# Patient Record
Sex: FEMALE | Race: WHITE | Employment: OTHER | URBAN - METROPOLITAN AREA
[De-identification: names, ages, dates, MRNs, and addresses within clinical notes are randomized per-mention and may not be internally consistent; named-entity substitution may affect disease eponyms.]

---

## 2017-01-24 ENCOUNTER — OFFICE VISIT (OUTPATIENT)
Dept: INTERNAL MEDICINE CLINIC | Facility: CLINIC | Age: 72
End: 2017-01-24

## 2017-01-24 VITALS
WEIGHT: 170 LBS | DIASTOLIC BLOOD PRESSURE: 64 MMHG | SYSTOLIC BLOOD PRESSURE: 124 MMHG | HEIGHT: 67 IN | BODY MASS INDEX: 26.68 KG/M2 | RESPIRATION RATE: 16 BRPM | TEMPERATURE: 98 F | HEART RATE: 68 BPM

## 2017-01-24 DIAGNOSIS — H66.92 LEFT ACUTE OTITIS MEDIA: Primary | ICD-10-CM

## 2017-01-24 DIAGNOSIS — M54.2 NECK PAIN, MUSCULOSKELETAL: ICD-10-CM

## 2017-01-24 PROCEDURE — 99213 OFFICE O/P EST LOW 20 MIN: CPT | Performed by: INTERNAL MEDICINE

## 2017-01-24 RX ORDER — AZITHROMYCIN 250 MG/1
TABLET, FILM COATED ORAL
Qty: 6 TABLET | Refills: 0 | Status: SHIPPED | OUTPATIENT
Start: 2017-01-24 | End: 2017-03-20 | Stop reason: ALTCHOICE

## 2017-01-24 NOTE — PROGRESS NOTES
Carol Lewis is a 80year old female.   Patient presents with:  Ear Pain: bilateral since the weekend  Neck Pain: started today ; no headaches   Nasal Congestion: drippy  throat per patient       HPI:     Patient c/o b/l ear pain, nasal congestion, fatigu Comment: 3 x year    Family History   Problem Relation Age of Onset   • Cancer Father 80     prostate    • Heart Disorder Father    • High Blood Pressure Father    • Other[other] [OTHER] Father    • Cancer Mother      lung, was a smoker   • Heart Disord finished           Imaging & Consults:  None    Return if symptoms worsen or fail to improve. There are no Patient Instructions on file for this visit. The patient indicates understanding of these issues and agrees to the plan.

## 2017-03-20 ENCOUNTER — OFFICE VISIT (OUTPATIENT)
Dept: HEMATOLOGY/ONCOLOGY | Facility: HOSPITAL | Age: 72
End: 2017-03-20
Attending: INTERNAL MEDICINE
Payer: MEDICARE

## 2017-03-20 VITALS
SYSTOLIC BLOOD PRESSURE: 115 MMHG | TEMPERATURE: 98 F | HEART RATE: 72 BPM | RESPIRATION RATE: 18 BRPM | WEIGHT: 170.38 LBS | DIASTOLIC BLOOD PRESSURE: 64 MMHG | OXYGEN SATURATION: 100 % | HEIGHT: 67.01 IN | BODY MASS INDEX: 26.74 KG/M2

## 2017-03-20 DIAGNOSIS — Z79.01 ADEQUATE ANTICOAGULATION ON ANTICOAGULANT THERAPY: ICD-10-CM

## 2017-03-20 DIAGNOSIS — I82.4Z3 ACUTE DEEP VEIN THROMBOSIS (DVT) OF DISTAL VEIN OF BOTH LOWER EXTREMITIES (HCC): Primary | ICD-10-CM

## 2017-03-20 DIAGNOSIS — I26.09 OTHER PULMONARY EMBOLISM WITH ACUTE COR PULMONALE, UNSPECIFIED CHRONICITY (HCC): ICD-10-CM

## 2017-03-20 LAB
ALBUMIN SERPL-MCNC: 3.6 G/DL (ref 3.5–4.8)
ALP LIVER SERPL-CCNC: 58 U/L (ref 55–142)
ALT SERPL-CCNC: 19 U/L (ref 14–54)
AST SERPL-CCNC: 15 U/L (ref 15–41)
BASOPHILS # BLD AUTO: 0.07 X10(3) UL (ref 0–0.1)
BASOPHILS NFR BLD AUTO: 1.4 %
BILIRUB SERPL-MCNC: 0.5 MG/DL (ref 0.1–2)
BUN BLD-MCNC: 14 MG/DL (ref 8–20)
CALCIUM BLD-MCNC: 9.1 MG/DL (ref 8.3–10.3)
CHLORIDE: 105 MMOL/L (ref 101–111)
CO2: 25 MMOL/L (ref 22–32)
CREAT BLD-MCNC: 0.83 MG/DL (ref 0.55–1.02)
EOSINOPHIL # BLD AUTO: 0.08 X10(3) UL (ref 0–0.3)
EOSINOPHIL NFR BLD AUTO: 1.6 %
ERYTHROCYTE [DISTWIDTH] IN BLOOD BY AUTOMATED COUNT: 12.7 % (ref 11.5–16)
GLUCOSE BLD-MCNC: 110 MG/DL (ref 70–99)
HCT VFR BLD AUTO: 37.5 % (ref 34–50)
HGB BLD-MCNC: 12.9 G/DL (ref 12–16)
IMMATURE GRANULOCYTE COUNT: 0.02 X10(3) UL (ref 0–1)
IMMATURE GRANULOCYTE RATIO %: 0.4 %
LYMPHOCYTES # BLD AUTO: 1.65 X10(3) UL (ref 0.9–4)
LYMPHOCYTES NFR BLD AUTO: 32.8 %
M PROTEIN MFR SERPL ELPH: 7.3 G/DL (ref 6.1–8.3)
MCH RBC QN AUTO: 31.8 PG (ref 27–33.2)
MCHC RBC AUTO-ENTMCNC: 34.4 G/DL (ref 31–37)
MCV RBC AUTO: 92.4 FL (ref 81–100)
MONOCYTES # BLD AUTO: 0.51 X10(3) UL (ref 0.1–0.6)
MONOCYTES NFR BLD AUTO: 10.1 %
NEUTROPHIL ABS PRELIM: 2.7 X10 (3) UL (ref 1.3–6.7)
NEUTROPHILS # BLD AUTO: 2.7 X10(3) UL (ref 1.3–6.7)
NEUTROPHILS NFR BLD AUTO: 53.7 %
PLATELET # BLD AUTO: 235 10(3)UL (ref 150–450)
POTASSIUM SERPL-SCNC: 4.1 MMOL/L (ref 3.6–5.1)
RBC # BLD AUTO: 4.06 X10(6)UL (ref 3.8–5.1)
RED CELL DISTRIBUTION WIDTH-SD: 42.8 FL (ref 35.1–46.3)
SODIUM SERPL-SCNC: 139 MMOL/L (ref 136–144)
WBC # BLD AUTO: 5 X10(3) UL (ref 4–13)

## 2017-03-20 PROCEDURE — 99214 OFFICE O/P EST MOD 30 MIN: CPT | Performed by: INTERNAL MEDICINE

## 2017-03-20 NOTE — PROGRESS NOTES
Hematology Clinic Follow Up Visit    Patient Name: Verona Triana  Medical Record Number: [id number]    YOB: 1945    PCP: Dr. Temitope Montero  Other providers:  Dr. Ilda Sorto (cardiology/vascular)    Reason for Consultation:  Verona Triana wa mouth. Disp:  Rfl:    Digestive Enzymes (DIGESTIVE SUPPORT OR) Take by mouth. Disp:  Rfl:    OCUVITE Oral Tab Take 1 tablet by mouth daily with breakfast. Disp:  Rfl:    AmLODIPine Besylate (NORVASC) 2.5 MG Oral Tab Take 2.5 mg by mouth daily.  Disp:  Rfl: 77.293 kg (170 lb 6.4 oz)  01/24/17 : 77.111 kg (170 lb)  11/28/16 : 75.569 kg (166 lb 9.6 oz)  11/17/16 : 73.936 kg (163 lb)  09/19/16 : 74.163 kg (163 lb 8 oz)  09/12/16 : 74.753 kg (164 lb 12.8 oz)    Physical Examination:  General: Patient is alert and (H)   PTT (Hepzyme)      25-34 seconds 49.0 (H)   STACLOT LA DELTA      <=8.00 seconds 15.30 (H)   DRVVT RATIO      0.00-1.29 1.05   BETA 2 GLYCOPROTEIN 1 AB, IgM      <=15.0 U/mL <3.7   BETA 2 GLYCOPROTEIN 1 AB, IgG      <=15.0 U/mL <3.7   PHOSPHOLIPID AB

## 2017-03-20 NOTE — PATIENT INSTRUCTIONS
For Dr. Linda Chew nurse line, call [phone] with any questions or concerns Monday through Friday 8:00 to 4:30. After hours or weekends for emergent needs [phone].

## 2017-03-20 NOTE — PROGRESS NOTES
Patient here for MD f/u. States feeling well, no complaints. Taking eliquis 2.5mg BID. Denies abnormal bleeding/bruising, denies SOB.

## 2017-05-03 RX ORDER — AMLODIPINE BESYLATE 2.5 MG/1
2.5 TABLET ORAL DAILY
Qty: 90 TABLET | Refills: 1 | Status: SHIPPED | OUTPATIENT
Start: 2017-05-03 | End: 2017-07-06

## 2017-06-05 ENCOUNTER — TELEPHONE (OUTPATIENT)
Dept: INTERNAL MEDICINE CLINIC | Facility: CLINIC | Age: 72
End: 2017-06-05

## 2017-06-05 DIAGNOSIS — Z12.31 ENCOUNTER FOR SCREENING MAMMOGRAM FOR MALIGNANT NEOPLASM OF BREAST: Primary | ICD-10-CM

## 2017-06-20 RX ORDER — NEOMYCIN SULFATE, POLYMYXIN B SULFATE, HYDROCORTISONE 3.5; 10000; 1 MG/ML; [USP'U]/ML; MG/ML
SOLUTION/ DROPS AURICULAR (OTIC)
Qty: 10 ML | Refills: 1 | Status: SHIPPED | OUTPATIENT
Start: 2017-06-20 | End: 2017-10-12

## 2017-06-20 NOTE — TELEPHONE ENCOUNTER
LOV: 1/24/17  Future office visit: No upcoming visit   Last labs: 3/20/17 Cmp Cbc   Last RX: Neomy/Polymyx-b/hc Otic Sol 11/29/16 #10 mL #1 Refill  Per protocol: Route to provider

## 2017-06-27 RX ORDER — MONTELUKAST SODIUM 10 MG/1
10 TABLET ORAL DAILY
Qty: 90 TABLET | Refills: 0 | Status: SHIPPED | OUTPATIENT
Start: 2017-06-27 | End: 2017-10-10

## 2017-06-30 ENCOUNTER — HOSPITAL ENCOUNTER (OUTPATIENT)
Dept: MAMMOGRAPHY | Age: 72
Discharge: HOME | End: 2017-06-30
Attending: INTERNAL MEDICINE
Payer: MEDICARE

## 2017-06-30 DIAGNOSIS — Z12.31 ENCOUNTER FOR SCREENING MAMMOGRAM FOR MALIGNANT NEOPLASM OF BREAST: ICD-10-CM

## 2017-06-30 PROCEDURE — 77067 SCR MAMMO BI INCL CAD: CPT | Performed by: INTERNAL MEDICINE

## 2017-07-05 ENCOUNTER — PATIENT MESSAGE (OUTPATIENT)
Dept: INTERNAL MEDICINE CLINIC | Facility: CLINIC | Age: 72
End: 2017-07-05

## 2017-07-05 NOTE — TELEPHONE ENCOUNTER
From: Jp Acosta  To: Oseas Beaulieu MD  Sent: 7/5/2017 6:46 AM CDT  Subject: Non-Urgent Medical Question    Dr. Carmen Razo:    Never have I had a reaction to a shot than I've been having with this 2nd pneumonia vaccine!  My arm is pretty sore, es

## 2017-07-07 RX ORDER — AMLODIPINE BESYLATE 2.5 MG/1
TABLET ORAL
Qty: 90 TABLET | Refills: 0 | Status: SHIPPED | OUTPATIENT
Start: 2017-07-07 | End: 2017-10-26

## 2017-07-12 NOTE — TELEPHONE ENCOUNTER
Pt stating shot injection site pain has completley subsided after 10days. No swelling or redness occurred. Just muscle soreness. ROM back to normal. No further questions or concerns. Pt verbalized understanding.

## 2017-09-01 ENCOUNTER — OFFICE VISIT (OUTPATIENT)
Dept: INTERNAL MEDICINE CLINIC | Facility: CLINIC | Age: 72
End: 2017-09-01

## 2017-09-01 VITALS
DIASTOLIC BLOOD PRESSURE: 60 MMHG | WEIGHT: 174 LBS | SYSTOLIC BLOOD PRESSURE: 124 MMHG | HEIGHT: 66 IN | BODY MASS INDEX: 27.97 KG/M2 | RESPIRATION RATE: 16 BRPM | HEART RATE: 56 BPM | TEMPERATURE: 98 F

## 2017-09-01 DIAGNOSIS — Z86.711 HISTORY OF PULMONARY EMBOLUS (PE): ICD-10-CM

## 2017-09-01 DIAGNOSIS — E78.5 MILD HYPERLIPIDEMIA: ICD-10-CM

## 2017-09-01 DIAGNOSIS — D22.9 MULTIPLE NEVI: ICD-10-CM

## 2017-09-01 DIAGNOSIS — I10 BENIGN ESSENTIAL HTN: ICD-10-CM

## 2017-09-01 DIAGNOSIS — Z00.00 WELLNESS EXAMINATION: Primary | ICD-10-CM

## 2017-09-01 PROCEDURE — G0439 PPPS, SUBSEQ VISIT: HCPCS | Performed by: INTERNAL MEDICINE

## 2017-09-01 NOTE — PROGRESS NOTES
HPI:   Ivette Macias is a 71year old female who presents for a Medicare Subsequent Annual Wellness visit (Pt already had Initial Annual Wellness).     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 Metoprolol Tartrate 50 MG Oral Tab Take 0.5 tablets (25 mg total) by mouth 2 (two) times daily. Take 25 mg by mouth 2 (two) times daily.    Fexofenadine HCl (WA +hx of allergies    EXAM:   /60 (BP Location: Right arm, Patient Position: Sitting, Cuff Size: adult)   Pulse 56   Temp 98.2 °F (36.8 °C) (Oral)   Resp 16   Ht 66\"   Wt 174 lb   BMI 28.08 kg/m²  Estimated body mass index is 28.08 kg/m² as calculated 06/30/2017   • TDAP 09/12/2016       ASSESSMENT AND OTHER RELEVANT CHRONIC CONDITIONS:   Castro Acosta is a 71year old female who presents for a Medicare Assessment.      PLAN SUMMARY:   Diagnoses and all orders for this visit:    Wellness examination - u immunizations at another office such as Influenza, Hepatitis B, Tetanus, or Pneumococcal?: Yes     Functional Ability     Bathing or Showering: Able without help    Toileting: Able without help    Dressing: Able without help    Eating: Able without help This section provided for quick review of chart, separate sheet to patient  [address] Internal Lab or Procedure External Lab or Procedure   Diabetes Screening      HbgA1C   Annually No results found for: A1C No fl (Pneumovax)  Covered Once after 65 06/30/2017 Please get once after your 65th birthday    Hepatitis B for Moderate/High Risk No vaccine history found Medium/high risk factors:   End-stage renal disease   Hemophiliacs who received Factor VIII or IX concentr

## 2017-09-18 ENCOUNTER — APPOINTMENT (OUTPATIENT)
Dept: HEMATOLOGY/ONCOLOGY | Facility: HOSPITAL | Age: 72
End: 2017-09-18
Attending: INTERNAL MEDICINE
Payer: MEDICARE

## 2017-09-21 ENCOUNTER — OFFICE VISIT (OUTPATIENT)
Dept: HEMATOLOGY/ONCOLOGY | Facility: HOSPITAL | Age: 72
End: 2017-09-21
Attending: INTERNAL MEDICINE
Payer: MEDICARE

## 2017-09-21 VITALS
SYSTOLIC BLOOD PRESSURE: 145 MMHG | RESPIRATION RATE: 16 BRPM | HEIGHT: 67.01 IN | WEIGHT: 176.19 LBS | BODY MASS INDEX: 27.65 KG/M2 | HEART RATE: 60 BPM | DIASTOLIC BLOOD PRESSURE: 80 MMHG | OXYGEN SATURATION: 98 % | TEMPERATURE: 98 F

## 2017-09-21 DIAGNOSIS — I26.09 OTHER PULMONARY EMBOLISM WITH ACUTE COR PULMONALE, UNSPECIFIED CHRONICITY (HCC): ICD-10-CM

## 2017-09-21 DIAGNOSIS — I82.4Z3 ACUTE DEEP VEIN THROMBOSIS (DVT) OF DISTAL VEIN OF BOTH LOWER EXTREMITIES (HCC): ICD-10-CM

## 2017-09-21 DIAGNOSIS — Z79.01 ADEQUATE ANTICOAGULATION ON ANTICOAGULANT THERAPY: ICD-10-CM

## 2017-09-21 LAB
ALBUMIN SERPL-MCNC: 3.6 G/DL (ref 3.5–4.8)
ALP LIVER SERPL-CCNC: 48 U/L (ref 55–142)
ALT SERPL-CCNC: 24 U/L (ref 14–54)
AST SERPL-CCNC: 14 U/L (ref 15–41)
BASOPHILS # BLD AUTO: 0.04 X10(3) UL (ref 0–0.1)
BASOPHILS NFR BLD AUTO: 0.7 %
BILIRUB SERPL-MCNC: 0.4 MG/DL (ref 0.1–2)
BUN BLD-MCNC: 11 MG/DL (ref 8–20)
CALCIUM BLD-MCNC: 8.6 MG/DL (ref 8.3–10.3)
CHLORIDE: 103 MMOL/L (ref 101–111)
CO2: 26 MMOL/L (ref 22–32)
CREAT BLD-MCNC: 0.76 MG/DL (ref 0.55–1.02)
EOSINOPHIL # BLD AUTO: 0.11 X10(3) UL (ref 0–0.3)
EOSINOPHIL NFR BLD AUTO: 1.9 %
ERYTHROCYTE [DISTWIDTH] IN BLOOD BY AUTOMATED COUNT: 12.7 % (ref 11.5–16)
GLUCOSE BLD-MCNC: 106 MG/DL (ref 70–99)
HCT VFR BLD AUTO: 35.8 % (ref 34–50)
HGB BLD-MCNC: 12.4 G/DL (ref 12–16)
IMMATURE GRANULOCYTE COUNT: 0.02 X10(3) UL (ref 0–1)
IMMATURE GRANULOCYTE RATIO %: 0.3 %
LYMPHOCYTES # BLD AUTO: 2.07 X10(3) UL (ref 0.9–4)
LYMPHOCYTES NFR BLD AUTO: 34.8 %
M PROTEIN MFR SERPL ELPH: 7.3 G/DL (ref 6.1–8.3)
MCH RBC QN AUTO: 31.7 PG (ref 27–33.2)
MCHC RBC AUTO-ENTMCNC: 34.6 G/DL (ref 31–37)
MCV RBC AUTO: 91.6 FL (ref 81–100)
MONOCYTES # BLD AUTO: 0.6 X10(3) UL (ref 0.1–0.6)
MONOCYTES NFR BLD AUTO: 10.1 %
NEUTROPHIL ABS PRELIM: 3.1 X10 (3) UL (ref 1.3–6.7)
NEUTROPHILS # BLD AUTO: 3.1 X10(3) UL (ref 1.3–6.7)
NEUTROPHILS NFR BLD AUTO: 52.2 %
PLATELET # BLD AUTO: 240 10(3)UL (ref 150–450)
POTASSIUM SERPL-SCNC: 4 MMOL/L (ref 3.6–5.1)
RBC # BLD AUTO: 3.91 X10(6)UL (ref 3.8–5.1)
RED CELL DISTRIBUTION WIDTH-SD: 42.2 FL (ref 35.1–46.3)
SODIUM SERPL-SCNC: 136 MMOL/L (ref 136–144)
WBC # BLD AUTO: 5.9 X10(3) UL (ref 4–13)

## 2017-09-21 PROCEDURE — 99214 OFFICE O/P EST MOD 30 MIN: CPT | Performed by: INTERNAL MEDICINE

## 2017-09-21 NOTE — PROGRESS NOTES
Patient here for MD f/u. States feeling well, no complaints. Taking eliquis daily. Denies SOB or fatigue. Denies abnormal bleeding or bruising.

## 2017-09-21 NOTE — PATIENT INSTRUCTIONS
For Dr. Harding Smoker nurse line, call [phone] with any questions or concerns Monday through Friday 8:00 to 4:30. After hours or weekends for emergent needs [phone].

## 2017-09-21 NOTE — PROGRESS NOTES
Hematology Clinic Follow Up Visit    Patient Name: Kath Allen  Medical Record Number: [id number]    YOB: 1945    PCP: Dr. Peggy Garcia  Other providers:  Dr. Keesha Tobin (cardiology/vascular)    Reason for Consultation:  Kath heredia mg total) by mouth 2 (two) times daily. Take 25 mg by mouth 2 (two) times daily. Disp: 90 tablet Rfl: 3   Fexofenadine HCl (WAL-FEX ALLERGY OR) Take by mouth 2 (two) times daily.    Disp:  Rfl:    Multiple Vitamins-Minerals (SENTRY SENIOR) Oral Tab Take by 1130)  Temp: 97.7 °F (36.5 °C) (09/21 1130)  Do Not Use - Resp Rate: --  SpO2: 98 % (09/21 1130)    Wt Readings from Last 6 Encounters:  09/21/17 : 79.9 kg (176 lb 3.2 oz)  09/01/17 : 78.9 kg (174 lb)  03/20/17 : 77.3 kg (170 lb 6.4 oz)  01/24/17 : 77.1 kg antibodies that are considered non-pathologic. . . .    DRVVT LUPUS ANTICOAGULANT      Negative Negative   PTT (LUPUS)      25.0-34.0 seconds 276.2 (H)   PTT (Hepzyme)      25-34 seconds 49.0 (H)   STACLOT LA DELTA      <=8.00 seconds 15.30 (H)   DRVVT RATI

## 2017-10-12 ENCOUNTER — LAB ENCOUNTER (OUTPATIENT)
Dept: LAB | Age: 72
End: 2017-10-12
Attending: INTERNAL MEDICINE
Payer: MEDICARE

## 2017-10-12 DIAGNOSIS — E78.5 MILD HYPERLIPIDEMIA: ICD-10-CM

## 2017-10-12 DIAGNOSIS — I10 BENIGN ESSENTIAL HTN: ICD-10-CM

## 2017-10-12 PROCEDURE — 80061 LIPID PANEL: CPT

## 2017-10-12 RX ORDER — NEOMYCIN SULFATE, POLYMYXIN B SULFATE, HYDROCORTISONE 3.5; 10000; 1 MG/ML; [USP'U]/ML; MG/ML
SOLUTION/ DROPS AURICULAR (OTIC)
Qty: 20 ML | Refills: 0 | Status: SHIPPED | OUTPATIENT
Start: 2017-10-12 | End: 2019-09-26

## 2017-10-16 RX ORDER — MONTELUKAST SODIUM 10 MG/1
10 TABLET ORAL DAILY
Qty: 90 TABLET | Refills: 0 | Status: SHIPPED | OUTPATIENT
Start: 2017-10-16 | End: 2018-01-29

## 2017-10-26 RX ORDER — AMLODIPINE BESYLATE 2.5 MG/1
2.5 TABLET ORAL
Qty: 90 TABLET | Refills: 0 | Status: SHIPPED | OUTPATIENT
Start: 2017-10-26 | End: 2018-01-09

## 2018-01-09 RX ORDER — APIXABAN 2.5 MG/1
TABLET, FILM COATED ORAL
Qty: 180 TABLET | Refills: 3 | Status: SHIPPED | OUTPATIENT
Start: 2018-01-09 | End: 2018-08-07

## 2018-01-09 RX ORDER — AMLODIPINE BESYLATE 2.5 MG/1
2.5 TABLET ORAL
Qty: 90 TABLET | Refills: 1 | Status: SHIPPED | OUTPATIENT
Start: 2018-01-09 | End: 2018-09-19

## 2018-01-29 RX ORDER — METOPROLOL TARTRATE 50 MG/1
TABLET, FILM COATED ORAL
Qty: 90 TABLET | Refills: 1 | Status: SHIPPED | OUTPATIENT
Start: 2018-01-29 | End: 2018-06-20

## 2018-01-29 RX ORDER — MONTELUKAST SODIUM 10 MG/1
10 TABLET ORAL DAILY
Qty: 90 TABLET | Refills: 0 | Status: SHIPPED | OUTPATIENT
Start: 2018-01-29 | End: 2018-05-03

## 2018-01-29 NOTE — TELEPHONE ENCOUNTER
Last Office Visit: 9-1-17 with TB for medicare wellness  Last Rx Filled: 10-16-17 90 tabs with no refills   Last Labs: 9-21-17 cbc/cmp  Future Appointment: none     Per protocol to provider    I don't see asthma on patient's problem list

## 2018-03-01 ENCOUNTER — OFFICE VISIT (OUTPATIENT)
Dept: HEMATOLOGY/ONCOLOGY | Facility: HOSPITAL | Age: 73
End: 2018-03-01
Attending: INTERNAL MEDICINE
Payer: MEDICARE

## 2018-03-01 VITALS
WEIGHT: 173.38 LBS | HEIGHT: 67.01 IN | SYSTOLIC BLOOD PRESSURE: 131 MMHG | TEMPERATURE: 98 F | RESPIRATION RATE: 18 BRPM | BODY MASS INDEX: 27.21 KG/M2 | OXYGEN SATURATION: 95 % | HEART RATE: 81 BPM | DIASTOLIC BLOOD PRESSURE: 83 MMHG

## 2018-03-01 DIAGNOSIS — Z79.01 ADEQUATE ANTICOAGULATION ON ANTICOAGULANT THERAPY: ICD-10-CM

## 2018-03-01 DIAGNOSIS — I26.09 OTHER PULMONARY EMBOLISM WITH ACUTE COR PULMONALE, UNSPECIFIED CHRONICITY (HCC): ICD-10-CM

## 2018-03-01 DIAGNOSIS — I82.4Z3 ACUTE DEEP VEIN THROMBOSIS (DVT) OF DISTAL VEIN OF BOTH LOWER EXTREMITIES (HCC): ICD-10-CM

## 2018-03-01 DIAGNOSIS — Z71.89 ENCOUNTER FOR ANTICOAGULATION DISCUSSION AND COUNSELING: Primary | ICD-10-CM

## 2018-03-01 DIAGNOSIS — D68.52 PROTHROMBIN GENE MUTATION (HCC): ICD-10-CM

## 2018-03-01 LAB
ALBUMIN SERPL-MCNC: 3.6 G/DL (ref 3.5–4.8)
ALP LIVER SERPL-CCNC: 58 U/L (ref 55–142)
ALT SERPL-CCNC: 21 U/L (ref 14–54)
AST SERPL-CCNC: 17 U/L (ref 15–41)
BASOPHILS # BLD AUTO: 0.04 X10(3) UL (ref 0–0.1)
BASOPHILS NFR BLD AUTO: 0.6 %
BILIRUB SERPL-MCNC: 0.4 MG/DL (ref 0.1–2)
BUN BLD-MCNC: 11 MG/DL (ref 8–20)
CALCIUM BLD-MCNC: 8.9 MG/DL (ref 8.3–10.3)
CHLORIDE: 104 MMOL/L (ref 101–111)
CO2: 24 MMOL/L (ref 22–32)
CREAT BLD-MCNC: 0.79 MG/DL (ref 0.55–1.02)
EOSINOPHIL # BLD AUTO: 0.15 X10(3) UL (ref 0–0.3)
EOSINOPHIL NFR BLD AUTO: 2.3 %
ERYTHROCYTE [DISTWIDTH] IN BLOOD BY AUTOMATED COUNT: 12.6 % (ref 11.5–16)
GLUCOSE BLD-MCNC: 111 MG/DL (ref 70–99)
HCT VFR BLD AUTO: 37.5 % (ref 34–50)
HGB BLD-MCNC: 12.7 G/DL (ref 12–16)
IMMATURE GRANULOCYTE COUNT: 0.03 X10(3) UL (ref 0–1)
IMMATURE GRANULOCYTE RATIO %: 0.5 %
LYMPHOCYTES # BLD AUTO: 2.09 X10(3) UL (ref 0.9–4)
LYMPHOCYTES NFR BLD AUTO: 31.8 %
M PROTEIN MFR SERPL ELPH: 7.4 G/DL (ref 6.1–8.3)
MCH RBC QN AUTO: 31.1 PG (ref 27–33.2)
MCHC RBC AUTO-ENTMCNC: 33.9 G/DL (ref 31–37)
MCV RBC AUTO: 91.7 FL (ref 81–100)
MONOCYTES # BLD AUTO: 0.62 X10(3) UL (ref 0.1–1)
MONOCYTES NFR BLD AUTO: 9.4 %
NEUTROPHIL ABS PRELIM: 3.64 X10 (3) UL (ref 1.3–6.7)
NEUTROPHILS # BLD AUTO: 3.64 X10(3) UL (ref 1.3–6.7)
NEUTROPHILS NFR BLD AUTO: 55.4 %
PLATELET # BLD AUTO: 246 10(3)UL (ref 150–450)
POTASSIUM SERPL-SCNC: 4.2 MMOL/L (ref 3.6–5.1)
RBC # BLD AUTO: 4.09 X10(6)UL (ref 3.8–5.1)
RED CELL DISTRIBUTION WIDTH-SD: 42.1 FL (ref 35.1–46.3)
SODIUM SERPL-SCNC: 137 MMOL/L (ref 136–144)
WBC # BLD AUTO: 6.6 X10(3) UL (ref 4–13)

## 2018-03-01 PROCEDURE — 99214 OFFICE O/P EST MOD 30 MIN: CPT | Performed by: INTERNAL MEDICINE

## 2018-03-01 NOTE — PROGRESS NOTES
Hematology Clinic Follow Up Visit    Patient Name: Ivette Macias  Medical Record Number: [id number]    YOB: 1945    PCP: Dr. Randell Marin  Other providers:  Dr. Thalia Reyes (cardiology/vascular)    Reason for Consultation:  Ivette Macias wa OR) Take by mouth daily as needed. Disp:  Rfl:    Multiple Vitamins-Minerals (SENTRY SENIOR) Oral Tab Take by mouth. Disp:  Rfl:    Digestive Enzymes (DIGESTIVE SUPPORT OR) Take by mouth daily.    Disp:  Rfl:    OCUVITE Oral Tab Take 1 tablet by mouth yina Encounters:  03/01/18 : 78.7 kg (173 lb 6.4 oz)  09/21/17 : 79.9 kg (176 lb 3.2 oz)  09/01/17 : 78.9 kg (174 lb)  03/20/17 : 77.3 kg (170 lb 6.4 oz)  01/24/17 : 77.1 kg (170 lb)  11/28/16 : 75.6 kg (166 lb 9.6 oz)    Physical Examination:  General: Patient Negative Negative   PTT (LUPUS)      25.0-34.0 seconds 276.2 (H)   PTT (Hepzyme)      25-34 seconds 49.0 (H)   STACLOT LA DELTA      <=8.00 seconds 15.30 (H)   DRVVT RATIO      0.00-1.29 1.05   BETA 2 GLYCOPROTEIN 1 AB, IgM      <=15.0 U/mL <3.7   BETA

## 2018-03-01 NOTE — PATIENT INSTRUCTIONS
For Dr. Delano Granado nurse line, call [phone] with any questions or concerns,  Monday through Friday 8:00 to 4:30. After hours or weekends for urgent needs: [phone].   Central [phone]  Medical Records:   [phone]  [facility]

## 2018-03-02 PROBLEM — Z71.89 ENCOUNTER FOR ANTICOAGULATION DISCUSSION AND COUNSELING: Status: ACTIVE | Noted: 2018-03-02

## 2018-03-02 PROBLEM — D68.52 PROTHROMBIN GENE MUTATION (HCC): Status: ACTIVE | Noted: 2018-03-02

## 2018-03-08 ENCOUNTER — APPOINTMENT (OUTPATIENT)
Dept: HEMATOLOGY/ONCOLOGY | Facility: HOSPITAL | Age: 73
End: 2018-03-08
Payer: MEDICARE

## 2018-03-22 ENCOUNTER — APPOINTMENT (OUTPATIENT)
Dept: HEMATOLOGY/ONCOLOGY | Facility: HOSPITAL | Age: 73
End: 2018-03-22
Attending: INTERNAL MEDICINE
Payer: MEDICARE

## 2018-05-04 RX ORDER — MONTELUKAST SODIUM 10 MG/1
10 TABLET ORAL DAILY
Qty: 90 TABLET | Refills: 0 | Status: SHIPPED | OUTPATIENT
Start: 2018-05-04 | End: 2018-06-22

## 2018-05-04 RX ORDER — AMLODIPINE BESYLATE 2.5 MG/1
2.5 TABLET ORAL DAILY
Qty: 90 TABLET | Refills: 0 | Status: SHIPPED | OUTPATIENT
Start: 2018-05-04 | End: 2018-06-22

## 2018-05-04 NOTE — TELEPHONE ENCOUNTER
Last Office Visit: 9-1-17 with TB for well adult  Last Rx Filled:  Singulair 1-29-18 90 tabs with no refills   Last Labs: 3-1-18 cbc/cmp  Future Appointment: none    Per protocol to provider

## 2018-06-11 ENCOUNTER — TELEPHONE (OUTPATIENT)
Dept: INTERNAL MEDICINE CLINIC | Facility: CLINIC | Age: 73
End: 2018-06-11

## 2018-06-11 DIAGNOSIS — Z12.31 ENCOUNTER FOR SCREENING MAMMOGRAM FOR MALIGNANT NEOPLASM OF BREAST: Primary | ICD-10-CM

## 2018-06-11 NOTE — TELEPHONE ENCOUNTER
Called patient LM on VM at [phone] (H) ok per HIPAA-informing mammogram order placed, she can call central scheduling at [phone] to schedule appointment. Patient to call back with any further questions.

## 2018-06-11 NOTE — TELEPHONE ENCOUNTER
Pt called and stated that it is time for her Mammogram. Pt would like order sent to [facility].      Please advise

## 2018-06-20 RX ORDER — METOPROLOL TARTRATE 50 MG/1
TABLET, FILM COATED ORAL
Qty: 90 TABLET | Refills: 0 | Status: SHIPPED | OUTPATIENT
Start: 2018-06-20 | End: 2018-09-25

## 2018-06-22 RX ORDER — MONTELUKAST SODIUM 10 MG/1
TABLET ORAL
Qty: 90 TABLET | Refills: 0 | Status: SHIPPED | OUTPATIENT
Start: 2018-06-22 | End: 2018-08-24

## 2018-06-22 RX ORDER — AMLODIPINE BESYLATE 2.5 MG/1
TABLET ORAL
Qty: 90 TABLET | Refills: 0 | Status: SHIPPED | OUTPATIENT
Start: 2018-06-22 | End: 2018-08-24

## 2018-07-02 ENCOUNTER — HOSPITAL ENCOUNTER (OUTPATIENT)
Dept: MAMMOGRAPHY | Facility: HOSPITAL | Age: 73
Discharge: HOME | End: 2018-07-02
Attending: INTERNAL MEDICINE
Payer: MEDICARE

## 2018-07-02 DIAGNOSIS — Z12.31 ENCOUNTER FOR SCREENING MAMMOGRAM FOR MALIGNANT NEOPLASM OF BREAST: ICD-10-CM

## 2018-07-02 PROCEDURE — 77063 BREAST TOMOSYNTHESIS BI: CPT | Performed by: INTERNAL MEDICINE

## 2018-07-02 PROCEDURE — 77067 SCR MAMMO BI INCL CAD: CPT | Performed by: INTERNAL MEDICINE

## 2018-08-07 DIAGNOSIS — I26.09 OTHER PULMONARY EMBOLISM WITH ACUTE COR PULMONALE, UNSPECIFIED CHRONICITY (HCC): Primary | ICD-10-CM

## 2018-08-07 RX ORDER — APIXABAN 2.5 MG/1
TABLET, FILM COATED ORAL
Qty: 180 TABLET | Refills: 1 | Status: SHIPPED | OUTPATIENT
Start: 2018-08-07 | End: 2019-07-01

## 2018-08-09 ENCOUNTER — TELEPHONE (OUTPATIENT)
Dept: INTERNAL MEDICINE CLINIC | Facility: CLINIC | Age: 73
End: 2018-08-09

## 2018-08-09 NOTE — TELEPHONE ENCOUNTER
Fax received from [facility] for 2nd attempt on refill . Refill too soon patient should still have medication. Sent form back to pharmacy stating too soon.      LOV:9/1/17 TB  FOV: none on file   LAST RX:6/20/18 half tablet twice a day 90 days 0 refills   LAS

## 2018-08-24 NOTE — TELEPHONE ENCOUNTER
LOV: 09/01/2017  Future Visit: No appointment scheduled   Last Labs: 03/01/2018 COMP, CBC  Last Rx: 06/22/2018    Per protocol sent to provider

## 2018-08-25 RX ORDER — AMLODIPINE BESYLATE 2.5 MG/1
TABLET ORAL
Qty: 90 TABLET | Refills: 0 | Status: SHIPPED | OUTPATIENT
Start: 2018-08-25 | End: 2019-03-28

## 2018-08-25 RX ORDER — MONTELUKAST SODIUM 10 MG/1
TABLET ORAL
Qty: 90 TABLET | Refills: 0 | Status: SHIPPED | OUTPATIENT
Start: 2018-08-25 | End: 2019-07-01

## 2018-08-27 NOTE — TELEPHONE ENCOUNTER
Future Appointments  9/25/2018 11:00 AM Demetria Hassan MD [facility]     For Medicare Wellness

## 2018-09-05 ENCOUNTER — APPOINTMENT (OUTPATIENT)
Dept: HEMATOLOGY/ONCOLOGY | Facility: HOSPITAL | Age: 73
End: 2018-09-05
Attending: INTERNAL MEDICINE
Payer: MEDICARE

## 2018-09-19 ENCOUNTER — OFFICE VISIT (OUTPATIENT)
Dept: HEMATOLOGY/ONCOLOGY | Facility: HOSPITAL | Age: 73
End: 2018-09-19
Attending: INTERNAL MEDICINE
Payer: MEDICARE

## 2018-09-19 VITALS
RESPIRATION RATE: 18 BRPM | HEIGHT: 67.01 IN | SYSTOLIC BLOOD PRESSURE: 133 MMHG | DIASTOLIC BLOOD PRESSURE: 72 MMHG | HEART RATE: 52 BPM | WEIGHT: 177 LBS | BODY MASS INDEX: 27.78 KG/M2 | OXYGEN SATURATION: 97 % | TEMPERATURE: 98 F

## 2018-09-19 DIAGNOSIS — Z79.01 CHRONIC ANTICOAGULATION: Primary | ICD-10-CM

## 2018-09-19 DIAGNOSIS — I26.09 OTHER PULMONARY EMBOLISM WITH ACUTE COR PULMONALE, UNSPECIFIED CHRONICITY (HCC): ICD-10-CM

## 2018-09-19 DIAGNOSIS — D68.52 PROTHROMBIN GENE MUTATION (HCC): ICD-10-CM

## 2018-09-19 LAB
ALBUMIN SERPL-MCNC: 3.4 G/DL (ref 3.5–4.8)
ALBUMIN/GLOB SERPL: 0.9 {RATIO} (ref 1–2)
ALP LIVER SERPL-CCNC: 52 U/L (ref 55–142)
ALT SERPL-CCNC: 23 U/L (ref 14–54)
ANION GAP SERPL CALC-SCNC: 5 MMOL/L (ref 0–18)
AST SERPL-CCNC: 18 U/L (ref 15–41)
BASOPHILS # BLD AUTO: 0.06 X10(3) UL (ref 0–0.1)
BASOPHILS NFR BLD AUTO: 1 %
BILIRUB SERPL-MCNC: 0.4 MG/DL (ref 0.1–2)
BUN BLD-MCNC: 11 MG/DL (ref 8–20)
BUN/CREAT SERPL: 15.3 (ref 10–20)
CALCIUM BLD-MCNC: 8.8 MG/DL (ref 8.3–10.3)
CHLORIDE SERPL-SCNC: 105 MMOL/L (ref 101–111)
CO2 SERPL-SCNC: 26 MMOL/L (ref 22–32)
CREAT BLD-MCNC: 0.72 MG/DL (ref 0.55–1.02)
EOSINOPHIL # BLD AUTO: 0.12 X10(3) UL (ref 0–0.3)
EOSINOPHIL NFR BLD AUTO: 2 %
ERYTHROCYTE [DISTWIDTH] IN BLOOD BY AUTOMATED COUNT: 12.4 % (ref 11.5–16)
GLOBULIN PLAS-MCNC: 3.7 G/DL (ref 2.5–4)
GLUCOSE BLD-MCNC: 99 MG/DL (ref 70–99)
HCT VFR BLD AUTO: 37.9 % (ref 34–50)
HGB BLD-MCNC: 12.5 G/DL (ref 12–16)
IMMATURE GRANULOCYTE COUNT: 0.02 X10(3) UL (ref 0–1)
IMMATURE GRANULOCYTE RATIO %: 0.3 %
LYMPHOCYTES # BLD AUTO: 1.73 X10(3) UL (ref 0.9–4)
LYMPHOCYTES NFR BLD AUTO: 28.5 %
M PROTEIN MFR SERPL ELPH: 7.1 G/DL (ref 6.1–8.3)
MCH RBC QN AUTO: 30.5 PG (ref 27–33.2)
MCHC RBC AUTO-ENTMCNC: 33 G/DL (ref 31–37)
MCV RBC AUTO: 92.4 FL (ref 81–100)
MONOCYTES # BLD AUTO: 0.63 X10(3) UL (ref 0.1–1)
MONOCYTES NFR BLD AUTO: 10.4 %
NEUTROPHIL ABS PRELIM: 3.52 X10 (3) UL (ref 1.3–6.7)
NEUTROPHILS # BLD AUTO: 3.52 X10(3) UL (ref 1.3–6.7)
NEUTROPHILS NFR BLD AUTO: 57.8 %
OSMOLALITY SERPL CALC.SUM OF ELEC: 281 MOSM/KG (ref 275–295)
PLATELET # BLD AUTO: 238 10(3)UL (ref 150–450)
POTASSIUM SERPL-SCNC: 4.2 MMOL/L (ref 3.6–5.1)
RBC # BLD AUTO: 4.1 X10(6)UL (ref 3.8–5.1)
RED CELL DISTRIBUTION WIDTH-SD: 42.3 FL (ref 35.1–46.3)
SODIUM SERPL-SCNC: 136 MMOL/L (ref 136–144)
WBC # BLD AUTO: 6.1 X10(3) UL (ref 4–13)

## 2018-09-19 PROCEDURE — 99213 OFFICE O/P EST LOW 20 MIN: CPT | Performed by: INTERNAL MEDICINE

## 2018-09-19 NOTE — PROGRESS NOTES
Hematology Clinic Follow Up Visit    Patient Name: [facility]  Medical Record Number: [id number]    YOB: 1945    PCP: Dr. Huber Olson  Other providers:  Dr. Raheem Willams (cardiology/vascular)    Reason for Consultation:  [facility] TIMES DAILY FOR 10 DAYS (Patient taking differently: INSTILL 4 DROPS TO AFFECTED EAR(S) 3 TIMES DAILY FOR 10 DAYS (taking as needed)) Disp: 20 mL Rfl: 0   Fexofenadine HCl (WAL-FEX ALLERGY OR) Take by mouth daily as needed.    Disp:  Rfl:    Multiple Vitami (173 lb 6.4 oz)  09/21/17 : 79.9 kg (176 lb 3.2 oz)  09/01/17 : 78.9 kg (174 lb)  03/20/17 : 77.3 kg (170 lb 6.4 oz)  01/24/17 : 77.1 kg (170 lb)    Physical Examination:  General: Patient is alert and oriented, not in acute distress  Psych: Mood and affec antibodies that are considered non-pathologic. . . .    DRVVT LUPUS ANTICOAGULANT      Negative Negative   PTT (LUPUS)      25.0-34.0 seconds 276.2 (H)   PTT (Hepzyme)      25-34 seconds 49.0 (H)   STACLOT LA DELTA      <=8.00 seconds 15.30 (H)   DRVVT RATI

## 2018-09-21 PROBLEM — Z79.01 CHRONIC ANTICOAGULATION: Status: ACTIVE | Noted: 2018-09-21

## 2018-09-25 ENCOUNTER — OFFICE VISIT (OUTPATIENT)
Dept: INTERNAL MEDICINE CLINIC | Facility: CLINIC | Age: 73
End: 2018-09-25
Payer: MEDICARE

## 2018-09-25 VITALS
HEART RATE: 52 BPM | RESPIRATION RATE: 16 BRPM | SYSTOLIC BLOOD PRESSURE: 126 MMHG | WEIGHT: 176.19 LBS | DIASTOLIC BLOOD PRESSURE: 60 MMHG | BODY MASS INDEX: 27.98 KG/M2 | HEIGHT: 66.5 IN | TEMPERATURE: 98 F

## 2018-09-25 DIAGNOSIS — Z91.09 ENVIRONMENTAL ALLERGIES: ICD-10-CM

## 2018-09-25 DIAGNOSIS — Z00.00 ENCOUNTER FOR ANNUAL HEALTH EXAMINATION: Primary | ICD-10-CM

## 2018-09-25 DIAGNOSIS — I10 BENIGN ESSENTIAL HTN: ICD-10-CM

## 2018-09-25 DIAGNOSIS — Z79.01 CHRONIC ANTICOAGULATION: ICD-10-CM

## 2018-09-25 DIAGNOSIS — Z78.0 POST-MENOPAUSAL: ICD-10-CM

## 2018-09-25 PROCEDURE — G0439 PPPS, SUBSEQ VISIT: HCPCS | Performed by: INTERNAL MEDICINE

## 2018-09-25 RX ORDER — METOPROLOL TARTRATE 50 MG/1
TABLET, FILM COATED ORAL
Qty: 90 TABLET | Refills: 0 | Status: SHIPPED | OUTPATIENT
Start: 2018-09-25 | End: 2019-02-20

## 2018-09-25 NOTE — PATIENT INSTRUCTIONS
Chelle Martinez's SCREENING SCHEDULE   Tests on this list are recommended by your physician but may not be covered, or covered at this frequency, by your insurer. Please check with your insurance carrier before scheduling to verify coverage.    PREVENTATIV more often if abnormal Colonoscopy due on 07/08/2021 Update Health Maintenance if applicable    Flex Sigmoidoscopy Screen  Covered every 5 years No results found for this or any previous visit. No flowsheet data found.      Fecal Occult Blood   Covered [place] Once after 65 No orders found for this or any previous visit. Please get once after your 65th birthday    Hepatitis B for Moderate/High Risk       No orders found for this or any previous visit.  Medium/high risk factors:   End-stage renal disease   Hemophi

## 2018-09-25 NOTE — PROGRESS NOTES
HPI:   Verona Triana is a 68year old female who presents for a Medicare Initial Annual Wellness visit (Once after 12 month Medicare anniversary) . Patient feels well, working part time, staying active.   HTN- BP low normal, on metoprolol and amlodipi pulmonale (HCC)     Benign essential HTN     Encounter for anticoagulation discussion and counseling     Prothrombin gene mutation ([facility]     Chronic anticoagulation    Wt Readings from Last 3 Encounters:  09/25/18 : 176 lb 3.2 oz  09/19/18 : 177 lb  03/01/ (3/12/16), Essential hypertension, and Pulmonary embolism with acute cor pulmonale ([facility] [address] [address] (3/12/16). She  has a past surgical history that includes other surgical history (3/12/16).     Her family history includes Asthma in her paternal grandfather; Ajith Jalloh tenderness   Lungs:   Clear to auscultation bilaterally, respirations unlabored   Heart:  Regular rate and rhythm, S1 and S2 normal, no murmur, rub, or gallop   Abdomen:   Soft, non-tender, bowel sounds active all four quadrants,  no masses, no organomegal the past six months, have you lost more than 10 pounds without trying?: 2 - No  Has your appetite been poor?: No  How does the patient maintain a good energy level?: Appropriate Exercise  How would you describe your daily physical activity?: Moderate  How Annually to 77, then as discussed Mammogram due on 07/02/2019 Update Health Maintenance if applicable     Immunizations (Update Immunization Activity if applicable)     Influenza  Covered Annually 8/29/2017 Please get every year    Pneumococcal 13 (Prevnar

## 2018-10-08 ENCOUNTER — HOSPITAL ENCOUNTER (OUTPATIENT)
Dept: BONE DENSITY | Age: 73
Discharge: HOME | End: 2018-10-08
Attending: INTERNAL MEDICINE
Payer: MEDICARE

## 2018-10-08 DIAGNOSIS — Z78.0 POST-MENOPAUSAL: ICD-10-CM

## 2018-10-08 PROCEDURE — 77080 DXA BONE DENSITY AXIAL: CPT | Performed by: INTERNAL MEDICINE

## 2019-02-20 RX ORDER — METOPROLOL TARTRATE 50 MG/1
TABLET, FILM COATED ORAL
Qty: 90 TABLET | Refills: 0 | Status: SHIPPED | OUTPATIENT
Start: 2019-02-20 | End: 2019-08-06

## 2019-02-25 ENCOUNTER — MED REC SCAN ONLY (OUTPATIENT)
Dept: INTERNAL MEDICINE CLINIC | Facility: CLINIC | Age: 74
End: 2019-02-25

## 2019-03-20 ENCOUNTER — APPOINTMENT (OUTPATIENT)
Dept: HEMATOLOGY/ONCOLOGY | Facility: HOSPITAL | Age: 74
End: 2019-03-20
Attending: INTERNAL MEDICINE
Payer: MEDICARE

## 2019-03-26 RX ORDER — NEOMYCIN SULFATE, POLYMYXIN B SULFATE, HYDROCORTISONE 3.5; 10000; 1 MG/ML; [USP'U]/ML; MG/ML
SOLUTION/ DROPS AURICULAR (OTIC)
Qty: 20 ML | Refills: 0 | OUTPATIENT
Start: 2019-03-26

## 2019-03-28 ENCOUNTER — OFFICE VISIT (OUTPATIENT)
Dept: HEMATOLOGY/ONCOLOGY | Facility: HOSPITAL | Age: 74
End: 2019-03-28
Attending: INTERNAL MEDICINE
Payer: MEDICARE

## 2019-03-28 VITALS
WEIGHT: 183 LBS | RESPIRATION RATE: 18 BRPM | SYSTOLIC BLOOD PRESSURE: 141 MMHG | HEIGHT: 67.01 IN | TEMPERATURE: 97 F | BODY MASS INDEX: 28.72 KG/M2 | DIASTOLIC BLOOD PRESSURE: 68 MMHG | HEART RATE: 65 BPM | OXYGEN SATURATION: 96 %

## 2019-03-28 DIAGNOSIS — D68.52 PROTHROMBIN GENE MUTATION (HCC): ICD-10-CM

## 2019-03-28 DIAGNOSIS — Z71.89 ENCOUNTER FOR ANTICOAGULATION DISCUSSION AND COUNSELING: ICD-10-CM

## 2019-03-28 DIAGNOSIS — Z79.01 CHRONIC ANTICOAGULATION: ICD-10-CM

## 2019-03-28 DIAGNOSIS — Z86.718 HISTORY OF DVT OF LOWER EXTREMITY: Primary | ICD-10-CM

## 2019-03-28 DIAGNOSIS — I26.09 OTHER PULMONARY EMBOLISM WITH ACUTE COR PULMONALE, UNSPECIFIED CHRONICITY (HCC): ICD-10-CM

## 2019-03-28 LAB
ALBUMIN SERPL-MCNC: 3.4 G/DL (ref 3.4–5)
ALBUMIN/GLOB SERPL: 0.9 {RATIO} (ref 1–2)
ALP LIVER SERPL-CCNC: 52 U/L (ref 55–142)
ALT SERPL-CCNC: 28 U/L (ref 13–56)
ANION GAP SERPL CALC-SCNC: 7 MMOL/L (ref 0–18)
AST SERPL-CCNC: 17 U/L (ref 15–37)
BASOPHILS # BLD AUTO: 0.06 X10(3) UL (ref 0–0.2)
BASOPHILS NFR BLD AUTO: 0.8 %
BILIRUB SERPL-MCNC: 0.6 MG/DL (ref 0.1–2)
BUN BLD-MCNC: 14 MG/DL (ref 7–18)
BUN/CREAT SERPL: 17.3 (ref 10–20)
CALCIUM BLD-MCNC: 9.1 MG/DL (ref 8.5–10.1)
CHLORIDE SERPL-SCNC: 103 MMOL/L (ref 98–107)
CO2 SERPL-SCNC: 25 MMOL/L (ref 21–32)
CREAT BLD-MCNC: 0.81 MG/DL (ref 0.55–1.02)
DEPRECATED RDW RBC AUTO: 42 FL (ref 35.1–46.3)
EOSINOPHIL # BLD AUTO: 0.16 X10(3) UL (ref 0–0.7)
EOSINOPHIL NFR BLD AUTO: 2.2 %
ERYTHROCYTE [DISTWIDTH] IN BLOOD BY AUTOMATED COUNT: 12.7 % (ref 11–15)
GLOBULIN PLAS-MCNC: 3.8 G/DL (ref 2.8–4.4)
GLUCOSE BLD-MCNC: 132 MG/DL (ref 70–99)
HCT VFR BLD AUTO: 37 % (ref 35–48)
HGB BLD-MCNC: 13 G/DL (ref 12–16)
IMM GRANULOCYTES # BLD AUTO: 0.03 X10(3) UL (ref 0–1)
IMM GRANULOCYTES NFR BLD: 0.4 %
LYMPHOCYTES # BLD AUTO: 2.12 X10(3) UL (ref 1–4)
LYMPHOCYTES NFR BLD AUTO: 29.5 %
M PROTEIN MFR SERPL ELPH: 7.2 G/DL (ref 6.4–8.2)
MCH RBC QN AUTO: 32 PG (ref 26–34)
MCHC RBC AUTO-ENTMCNC: 35.1 G/DL (ref 31–37)
MCV RBC AUTO: 91.1 FL (ref 80–100)
MONOCYTES # BLD AUTO: 0.67 X10(3) UL (ref 0.1–1)
MONOCYTES NFR BLD AUTO: 9.3 %
NEUTROPHILS # BLD AUTO: 4.15 X10 (3) UL (ref 1.5–7.7)
NEUTROPHILS # BLD AUTO: 4.15 X10(3) UL (ref 1.5–7.7)
NEUTROPHILS NFR BLD AUTO: 57.8 %
OSMOLALITY SERPL CALC.SUM OF ELEC: 282 MOSM/KG (ref 275–295)
PLATELET # BLD AUTO: 219 10(3)UL (ref 150–450)
POTASSIUM SERPL-SCNC: 4.3 MMOL/L (ref 3.5–5.1)
RBC # BLD AUTO: 4.06 X10(6)UL (ref 3.8–5.3)
SODIUM SERPL-SCNC: 135 MMOL/L (ref 136–145)
WBC # BLD AUTO: 7.2 X10(3) UL (ref 4–11)

## 2019-03-28 PROCEDURE — 99214 OFFICE O/P EST MOD 30 MIN: CPT | Performed by: INTERNAL MEDICINE

## 2019-03-28 NOTE — PROGRESS NOTES
Pt here for 6 month MD f/up DVT/PE. Pt states she is taking her eliquis religiously. Denies any bleeding or bruising/CP/SOB.

## 2019-03-28 NOTE — PROGRESS NOTES
Hematology Clinic Follow Up Visit    Patient Name: Abilio Butler  Medical Record Number: [id number]    YOB: 1945    PCP: Dr. Val Triana  Other providers:  Dr. Dash Ferreira (cardiology/vascular)    Reason for Consultation:  Abilio heredia 180 tablet Rfl: 1   NEOMYCIN-POLYMYXIN-HC 1 % Otic Solution INSTILL 4 DROPS TO AFFECTED EAR(S) 3 TIMES DAILY FOR 10 DAYS (Patient taking differently: INSTILL 4 DROPS TO AFFECTED EAR(S) 3 TIMES DAILY FOR 10 DAYS (taking as needed)) Disp: 20 mL Rfl: 0   Fexo (03/28 1033)  Do Not Use - Resp Rate: --  SpO2: 96 % (03/28 1033)    Wt Readings from Last 6 Encounters:  03/28/19 : 83 kg (183 lb)  09/25/18 : 79.9 kg (176 lb 3.2 oz)  09/19/18 : 80.3 kg (177 lb)  03/01/18 : 78.7 kg (173 lb 6.4 oz)  09/21/17 : 79.9 kg (17 PATHOLOGIST INTERPRETATION       Results indicate the presence of a lupus anticoagulant. Repeat testing in 12 weeks is recommended to rule out transient antibodies that are considered non-pathologic. . . .    DRVVT LUPUS ANTICOAGULANT      Negative Negati [facility] [facility]

## 2019-03-29 ENCOUNTER — TELEPHONE (OUTPATIENT)
Dept: INTERNAL MEDICINE CLINIC | Facility: CLINIC | Age: 74
End: 2019-03-29

## 2019-03-29 ENCOUNTER — PATIENT MESSAGE (OUTPATIENT)
Dept: INTERNAL MEDICINE CLINIC | Facility: CLINIC | Age: 74
End: 2019-03-29

## 2019-03-29 RX ORDER — NEOMYCIN SULFATE, POLYMYXIN B SULFATE, HYDROCORTISONE 3.5; 10000; 1 MG/ML; [USP'U]/ML; MG/ML
SOLUTION/ DROPS AURICULAR (OTIC)
Qty: 30 ML | Refills: 1 | Status: SHIPPED | OUTPATIENT
Start: 2019-03-29 | End: 2019-06-19

## 2019-05-23 ENCOUNTER — PATIENT MESSAGE (OUTPATIENT)
Dept: INTERNAL MEDICINE CLINIC | Facility: CLINIC | Age: 74
End: 2019-05-23

## 2019-06-17 ENCOUNTER — TELEPHONE (OUTPATIENT)
Dept: INTERNAL MEDICINE CLINIC | Facility: CLINIC | Age: 74
End: 2019-06-17

## 2019-06-17 DIAGNOSIS — R92.2 DENSE BREAST: ICD-10-CM

## 2019-06-17 DIAGNOSIS — Z12.31 ENCOUNTER FOR SCREENING MAMMOGRAM FOR MALIGNANT NEOPLASM OF BREAST: Primary | ICD-10-CM

## 2019-06-17 NOTE — TELEPHONE ENCOUNTER
Please place orders for mamogram to [facility] per patients request.    Thank you

## 2019-06-18 NOTE — TELEPHONE ENCOUNTER
LM on  at [phone] informing mammogram order placed. Patient to call back with any further questions.

## 2019-07-01 DIAGNOSIS — I26.09 OTHER PULMONARY EMBOLISM WITH ACUTE COR PULMONALE, UNSPECIFIED CHRONICITY (HCC): ICD-10-CM

## 2019-07-02 RX ORDER — APIXABAN 2.5 MG/1
TABLET, FILM COATED ORAL
Qty: 180 TABLET | Refills: 1 | Status: SHIPPED | OUTPATIENT
Start: 2019-07-02 | End: 2019-11-17

## 2019-07-02 NOTE — TELEPHONE ENCOUNTER
Last Ov: 9/25/18, TB, physical  Upcoming appt: no upcoming appt  Last labs: lipid 10/12/17  Last Rx: montelukast 10mg, #90, 0R 8/25/18    Per Protocol, fail. Asthma action score out of range, pt due for appt, and AAP/ACT plan due.  Rx pending to pharmacy, p

## 2019-07-03 RX ORDER — MONTELUKAST SODIUM 10 MG/1
TABLET ORAL
Qty: 90 TABLET | Refills: 0 | Status: SHIPPED | OUTPATIENT
Start: 2019-07-03 | End: 2020-01-20

## 2019-07-05 ENCOUNTER — TELEPHONE (OUTPATIENT)
Dept: INTERNAL MEDICINE CLINIC | Facility: CLINIC | Age: 74
End: 2019-07-05

## 2019-07-05 DIAGNOSIS — Z00.00 ROUTINE GENERAL MEDICAL EXAMINATION AT A HEALTH CARE FACILITY: Primary | ICD-10-CM

## 2019-07-05 DIAGNOSIS — E78.5 MILD HYPERLIPIDEMIA: ICD-10-CM

## 2019-07-05 DIAGNOSIS — I10 BENIGN ESSENTIAL HTN: ICD-10-CM

## 2019-07-05 NOTE — TELEPHONE ENCOUNTER
Future Appointments   9/27/2019 10:00 AM Kirill Ivan MD [facility]     Pt would like fasting BW sent to [facility]

## 2019-07-16 ENCOUNTER — HOSPITAL ENCOUNTER (OUTPATIENT)
Dept: MAMMOGRAPHY | Facility: HOSPITAL | Age: 74
Discharge: HOME | End: 2019-07-16
Attending: INTERNAL MEDICINE
Payer: MEDICARE

## 2019-07-16 DIAGNOSIS — Z12.31 ENCOUNTER FOR SCREENING MAMMOGRAM FOR MALIGNANT NEOPLASM OF BREAST: ICD-10-CM

## 2019-07-16 DIAGNOSIS — R92.2 DENSE BREAST: ICD-10-CM

## 2019-07-16 PROCEDURE — 77067 SCR MAMMO BI INCL CAD: CPT | Performed by: INTERNAL MEDICINE

## 2019-07-16 PROCEDURE — 77063 BREAST TOMOSYNTHESIS BI: CPT | Performed by: INTERNAL MEDICINE

## 2019-08-07 RX ORDER — METOPROLOL TARTRATE 50 MG/1
TABLET, FILM COATED ORAL
Qty: 90 TABLET | Refills: 0 | Status: SHIPPED | OUTPATIENT
Start: 2019-08-07 | End: 2019-10-29

## 2019-08-07 NOTE — TELEPHONE ENCOUNTER
Last Ov: 9/25/18, TB, physical  Upcoming appt: 9/27/19  Last labs: Lipid 10/12/17  Last Rx: metoprolol 50mg, #90, 0R 2/20/19    Per Protocol, passed. Rx refill sent to pharmacy.

## 2019-08-26 ENCOUNTER — MED REC SCAN ONLY (OUTPATIENT)
Dept: INTERNAL MEDICINE CLINIC | Facility: CLINIC | Age: 74
End: 2019-08-26

## 2019-09-02 ENCOUNTER — HOSPITAL ENCOUNTER (EMERGENCY)
Facility: HOSPITAL | Age: 74
Discharge: HOME | End: 2019-09-02
Attending: EMERGENCY MEDICINE
Payer: MEDICARE

## 2019-09-02 VITALS — OXYGEN SATURATION: 99 % | RESPIRATION RATE: 20 BRPM | HEART RATE: 68 BPM | TEMPERATURE: 98 F

## 2019-09-02 DIAGNOSIS — T14.8XXA SKIN AVULSION: Primary | ICD-10-CM

## 2019-09-02 PROCEDURE — 99283 EMERGENCY DEPT VISIT LOW MDM: CPT

## 2019-09-03 NOTE — ED NOTES
Pt reports she was having issues with getting the bleeding to stop at home, bleeding controlled, had Pt wash hands.

## 2019-09-03 NOTE — ED PROVIDER NOTES
Patient Seen in: [facility] Emergency Department    History   Patient presents with:  Laceration Abrasion (integumentary)    Stated Complaint: finger lac    HPI    44-year-old female presents emergency department complaining laceration to finger fro posterior pharynx  Neck: Supple no JVD no lymphadenopathy no meningismus no carotid bruit  CV: Regular rate and rhythm no murmur rub  Respiratory: Clear to auscultation bilaterally no crackles no wheezes no accessory muscle use  Abdomen: Soft nontender non

## 2019-09-16 NOTE — TELEPHONE ENCOUNTER
Pt is calling to check on the status of her orders.   Has an  Future Appointments   Date Time Provider Nery Cat   9/27/2019 10:00 AM Miguel Gill MD [facility]

## 2019-09-18 NOTE — TELEPHONE ENCOUNTER
CBC, CMP ordered by Dr. Griffin Console 3/2019  FLP, TSH ordered per protocol. Lm for pt ([id number] [address] per HIPAA) to inform fasting labs ordered to [facility] as requested. To call back at the office if any further questions.

## 2019-09-20 ENCOUNTER — LAB ENCOUNTER (OUTPATIENT)
Dept: LAB | Age: 74
End: 2019-09-20
Attending: INTERNAL MEDICINE
Payer: MEDICARE

## 2019-09-20 DIAGNOSIS — Z79.01 CHRONIC ANTICOAGULATION: ICD-10-CM

## 2019-09-20 DIAGNOSIS — I10 BENIGN ESSENTIAL HTN: ICD-10-CM

## 2019-09-20 DIAGNOSIS — Z00.00 ROUTINE GENERAL MEDICAL EXAMINATION AT A HEALTH CARE FACILITY: ICD-10-CM

## 2019-09-20 DIAGNOSIS — E78.5 MILD HYPERLIPIDEMIA: ICD-10-CM

## 2019-09-20 DIAGNOSIS — I26.09 OTHER PULMONARY EMBOLISM WITH ACUTE COR PULMONALE, UNSPECIFIED CHRONICITY (HCC): ICD-10-CM

## 2019-09-20 DIAGNOSIS — Z86.718 HISTORY OF DVT OF LOWER EXTREMITY: ICD-10-CM

## 2019-09-20 LAB
ALBUMIN SERPL-MCNC: 3.7 G/DL (ref 3.4–5)
ALBUMIN/GLOB SERPL: 1 {RATIO} (ref 1–2)
ALP LIVER SERPL-CCNC: 50 U/L (ref 55–142)
ALT SERPL-CCNC: 34 U/L (ref 13–56)
ANION GAP SERPL CALC-SCNC: 4 MMOL/L (ref 0–18)
AST SERPL-CCNC: 22 U/L (ref 15–37)
BASOPHILS # BLD AUTO: 0.06 X10(3) UL (ref 0–0.2)
BASOPHILS NFR BLD AUTO: 1.1 %
BILIRUB SERPL-MCNC: 0.7 MG/DL (ref 0.1–2)
BUN BLD-MCNC: 10 MG/DL (ref 7–18)
BUN/CREAT SERPL: 10.9 (ref 10–20)
CALCIUM BLD-MCNC: 9.1 MG/DL (ref 8.5–10.1)
CHLORIDE SERPL-SCNC: 106 MMOL/L (ref 98–112)
CHOLEST SMN-MCNC: 247 MG/DL (ref ?–200)
CO2 SERPL-SCNC: 28 MMOL/L (ref 21–32)
CREAT BLD-MCNC: 0.92 MG/DL (ref 0.55–1.02)
DEPRECATED RDW RBC AUTO: 45.3 FL (ref 35.1–46.3)
EOSINOPHIL # BLD AUTO: 0.19 X10(3) UL (ref 0–0.7)
EOSINOPHIL NFR BLD AUTO: 3.4 %
ERYTHROCYTE [DISTWIDTH] IN BLOOD BY AUTOMATED COUNT: 12.8 % (ref 11–15)
GLOBULIN PLAS-MCNC: 3.7 G/DL (ref 2.8–4.4)
GLUCOSE BLD-MCNC: 107 MG/DL (ref 70–99)
HCT VFR BLD AUTO: 40.3 % (ref 35–48)
HDLC SERPL-MCNC: 57 MG/DL (ref 40–59)
HGB BLD-MCNC: 13.4 G/DL (ref 12–16)
IMM GRANULOCYTES # BLD AUTO: 0.02 X10(3) UL (ref 0–1)
IMM GRANULOCYTES NFR BLD: 0.4 %
LDLC SERPL CALC-MCNC: 162 MG/DL (ref ?–100)
LYMPHOCYTES # BLD AUTO: 1.73 X10(3) UL (ref 1–4)
LYMPHOCYTES NFR BLD AUTO: 30.6 %
M PROTEIN MFR SERPL ELPH: 7.4 G/DL (ref 6.4–8.2)
MCH RBC QN AUTO: 31.8 PG (ref 26–34)
MCHC RBC AUTO-ENTMCNC: 33.3 G/DL (ref 31–37)
MCV RBC AUTO: 95.5 FL (ref 80–100)
MONOCYTES # BLD AUTO: 0.66 X10(3) UL (ref 0.1–1)
MONOCYTES NFR BLD AUTO: 11.7 %
NEUTROPHILS # BLD AUTO: 2.99 X10 (3) UL (ref 1.5–7.7)
NEUTROPHILS # BLD AUTO: 2.99 X10(3) UL (ref 1.5–7.7)
NEUTROPHILS NFR BLD AUTO: 52.8 %
NONHDLC SERPL-MCNC: 190 MG/DL (ref ?–130)
OSMOLALITY SERPL CALC.SUM OF ELEC: 286 MOSM/KG (ref 275–295)
PLATELET # BLD AUTO: 261 10(3)UL (ref 150–450)
POTASSIUM SERPL-SCNC: 4.7 MMOL/L (ref 3.5–5.1)
RBC # BLD AUTO: 4.22 X10(6)UL (ref 3.8–5.3)
SODIUM SERPL-SCNC: 138 MMOL/L (ref 136–145)
TRIGL SERPL-MCNC: 138 MG/DL (ref 30–149)
TSI SER-ACNC: 2.8 MIU/ML (ref 0.36–3.74)
VLDLC SERPL CALC-MCNC: 28 MG/DL (ref 0–30)
WBC # BLD AUTO: 5.7 X10(3) UL (ref 4–11)

## 2019-09-20 PROCEDURE — 80053 COMPREHEN METABOLIC PANEL: CPT

## 2019-09-20 PROCEDURE — 85025 COMPLETE CBC W/AUTO DIFF WBC: CPT

## 2019-09-20 PROCEDURE — 84443 ASSAY THYROID STIM HORMONE: CPT

## 2019-09-20 PROCEDURE — 80061 LIPID PANEL: CPT

## 2019-09-26 ENCOUNTER — OFFICE VISIT (OUTPATIENT)
Dept: HEMATOLOGY/ONCOLOGY | Facility: HOSPITAL | Age: 74
End: 2019-09-26
Attending: INTERNAL MEDICINE
Payer: MEDICARE

## 2019-09-26 VITALS
SYSTOLIC BLOOD PRESSURE: 149 MMHG | HEIGHT: 66 IN | DIASTOLIC BLOOD PRESSURE: 79 MMHG | RESPIRATION RATE: 16 BRPM | HEART RATE: 53 BPM | BODY MASS INDEX: 28.99 KG/M2 | TEMPERATURE: 97 F | WEIGHT: 180.38 LBS | OXYGEN SATURATION: 98 %

## 2019-09-26 DIAGNOSIS — Z79.01 CHRONIC ANTICOAGULATION: Primary | ICD-10-CM

## 2019-09-26 DIAGNOSIS — Z86.718 HISTORY OF DVT OF LOWER EXTREMITY: ICD-10-CM

## 2019-09-26 DIAGNOSIS — D68.52 PROTHROMBIN GENE MUTATION (HCC): ICD-10-CM

## 2019-09-26 DIAGNOSIS — I26.09 OTHER PULMONARY EMBOLISM WITH ACUTE COR PULMONALE, UNSPECIFIED CHRONICITY (HCC): ICD-10-CM

## 2019-09-26 PROCEDURE — 99213 OFFICE O/P EST LOW 20 MIN: CPT | Performed by: INTERNAL MEDICINE

## 2019-09-26 NOTE — PROGRESS NOTES
Education Record    Learner:  Patient    Disease / Diagnosis: DVT/PE    Barriers / Limitations:  None   Comments:    Method:  Brief focused   Comments:    General Topics:  Plan of care reviewed   Comments:    Outcome:  Shows understanding   Comments:pt her

## 2019-09-26 NOTE — PROGRESS NOTES
Hematology Clinic Follow Up Visit    Patient Name: Lawson Petersen  Medical Record Number: [id number]    YOB: 1945    PCP: Dr. Lord Fox  Other providers:  Dr. Angel Grullon (cardiology/vascular)    Reason for Consultation:  Lawson heredia daily as needed. Disp:  Rfl:    Multiple Vitamins-Minerals (SENTRY SENIOR) Oral Tab Take by mouth daily. Disp:  Rfl:    Digestive Enzymes (DIGESTIVE SUPPORT OR) Take by mouth daily as needed.    Disp:  Rfl:    OCUVITE Oral Tab Take 1 tablet by mouth yina Examination:  General: Patient is alert and oriented, not in acute distress  Psych: Mood and affect are appropriate  Eyes: EOMI  ENT: Oropharynx is clear  CV: Regular rate and rhythm, no murmurs  Respiratory: Lungs clear to auscultation bilaterally  GI/Abd (Hepzyme)      25-34 seconds 49.0 (H)   STACLOT LA DELTA      <=8.00 seconds 15.30 (H)   DRVVT RATIO      0.00-1.29 1.05   BETA 2 GLYCOPROTEIN 1 AB, IgM      <=15.0 U/mL <3.7   BETA 2 GLYCOPROTEIN 1 AB, IgG      <=15.0 U/mL <3.7   PHOSPHOLIPID AB, IgM

## 2019-09-27 ENCOUNTER — OFFICE VISIT (OUTPATIENT)
Dept: INTERNAL MEDICINE CLINIC | Facility: CLINIC | Age: 74
End: 2019-09-27
Payer: MEDICARE

## 2019-09-27 VITALS
BODY MASS INDEX: 29.46 KG/M2 | HEIGHT: 65.5 IN | SYSTOLIC BLOOD PRESSURE: 132 MMHG | HEART RATE: 52 BPM | WEIGHT: 179 LBS | TEMPERATURE: 98 F | RESPIRATION RATE: 16 BRPM | DIASTOLIC BLOOD PRESSURE: 84 MMHG

## 2019-09-27 DIAGNOSIS — I10 BENIGN ESSENTIAL HTN: ICD-10-CM

## 2019-09-27 DIAGNOSIS — E78.00 PURE HYPERCHOLESTEROLEMIA: ICD-10-CM

## 2019-09-27 DIAGNOSIS — Z00.00 ENCOUNTER FOR ANNUAL HEALTH EXAMINATION: Primary | ICD-10-CM

## 2019-09-27 PROCEDURE — 93000 ELECTROCARDIOGRAM COMPLETE: CPT | Performed by: INTERNAL MEDICINE

## 2019-09-27 PROCEDURE — G0439 PPPS, SUBSEQ VISIT: HCPCS | Performed by: INTERNAL MEDICINE

## 2019-09-27 NOTE — PATIENT INSTRUCTIONS
Anastasia Martinez's SCREENING SCHEDULE   Tests on this list are recommended by your physician but may not be covered, or covered at this frequency, by your insurer. Please check with your insurance carrier before scheduling to verify coverage.    PREVENTATIV often if abnormal Colonoscopy due on 07/08/2021 Update [facility] if applicable    Flex Sigmoidoscopy Screen  Covered every 5 years No results found for this or any previous visit. No flowsheet data found.      Fecal Occult Blood   Covered Annually after 65 No orders found for this or any previous visit. Please get once after your 65th birthday    Hepatitis B for Moderate/High Risk       No orders found for this or any previous visit.  Medium/high risk factors:   End-stage renal disease   Hemophiliacs

## 2019-09-27 NOTE — PROGRESS NOTES
HPI:   Quintin Foster is a 69year old female who presents for a Medicare Subsequent Annual Wellness visit (Pt already had Initial Annual Wellness). Patient has retired, feeling great. Bikes for exercise, can do many miles for hours.  Very active with anticoagulation discussion and counseling     [facility]     Chronic anticoagulation     History of DVT of lower extremity    Wt Readings from Last 3 Encounters:  09/27/19 : 179 lb  09/26/19 : 180 lb 6.4 oz  06/19/19 : 180 lb 9.6 oz her maternal grandfather and mother; Cancer (age of onset: 80) in her father; Diabetes in her sister; Fibromyalgia in her sister; Heart Disorder in her father, maternal grandmother, and mother; High Blood Pressure in her father and mother;  Other in her fat no cyanosis or edema   Pulses: 2+ and symmetric   Skin: Skin color, texture, turgor normal, no rashes or lesions   Lymph nodes: Cervical, supraclavicular, and axillary nodes normal   Neurologic: Normal     EKG: SB, RBBB, no acute ST-TW changes, similar ekg your current health state?: Good  How do you maintain positive mental well-being?: Social Interaction; Visiting Friends; Visiting Family      This section provided for quick review of chart, separate sheet to patient  PREVENTATIVE SERVICES  INDICATIONS AND S (Prevnar)  Covered Once after 65 06/19/2016 Please get once after your 65th birthday    Pneumococcal 23 (Pneumovax)  Covered Once after 65 06/29/2017 Please get once after your 65th birthday    Hepatitis B for Moderate/High Risk No vaccine history found Me

## 2019-10-30 RX ORDER — METOPROLOL TARTRATE 50 MG/1
TABLET, FILM COATED ORAL
Qty: 90 TABLET | Refills: 1 | Status: SHIPPED | OUTPATIENT
Start: 2019-10-30 | End: 2020-04-24

## 2019-11-17 DIAGNOSIS — I26.09 OTHER PULMONARY EMBOLISM WITH ACUTE COR PULMONALE, UNSPECIFIED CHRONICITY (HCC): ICD-10-CM

## 2019-11-18 RX ORDER — APIXABAN 2.5 MG/1
TABLET, FILM COATED ORAL
Qty: 180 TABLET | Refills: 1 | Status: SHIPPED | OUTPATIENT
Start: 2019-11-18 | End: 2020-05-05

## 2019-12-04 ENCOUNTER — LAB ENCOUNTER (OUTPATIENT)
Dept: LAB | Facility: HOSPITAL | Age: 74
End: 2019-12-04
Attending: INTERNAL MEDICINE
Payer: MEDICARE

## 2019-12-04 DIAGNOSIS — Z79.01 CHRONIC ANTICOAGULATION: ICD-10-CM

## 2019-12-04 DIAGNOSIS — I26.09 OTHER PULMONARY EMBOLISM WITH ACUTE COR PULMONALE, UNSPECIFIED CHRONICITY (HCC): ICD-10-CM

## 2019-12-04 DIAGNOSIS — Z86.718 HISTORY OF DVT OF LOWER EXTREMITY: ICD-10-CM

## 2019-12-04 DIAGNOSIS — E78.00 PURE HYPERCHOLESTEROLEMIA: ICD-10-CM

## 2019-12-04 PROCEDURE — 80053 COMPREHEN METABOLIC PANEL: CPT

## 2019-12-04 PROCEDURE — 80061 LIPID PANEL: CPT

## 2019-12-04 PROCEDURE — 85025 COMPLETE CBC W/AUTO DIFF WBC: CPT

## 2019-12-04 PROCEDURE — 36415 COLL VENOUS BLD VENIPUNCTURE: CPT

## 2020-01-20 RX ORDER — MONTELUKAST SODIUM 10 MG/1
TABLET ORAL
Qty: 90 TABLET | Refills: 0 | Status: SHIPPED | OUTPATIENT
Start: 2020-01-20 | End: 2020-04-30

## 2020-01-20 NOTE — TELEPHONE ENCOUNTER
Last Ov: 9/27/19, TB, physical  Upcoming appt: no upcoming appt  Last labs: Lipid, CMP, CBC 12/4/19  Last Rx: montelukast 10mg, #90, 0R 7/3/19    Per Protocol, failed. Pt due for AAP/ACT and last asthma score out of range. Rx pending.

## 2020-01-23 ENCOUNTER — MED REC SCAN ONLY (OUTPATIENT)
Dept: INTERNAL MEDICINE CLINIC | Facility: CLINIC | Age: 75
End: 2020-01-23

## 2020-03-26 ENCOUNTER — APPOINTMENT (OUTPATIENT)
Dept: HEMATOLOGY/ONCOLOGY | Facility: HOSPITAL | Age: 75
End: 2020-03-26
Attending: INTERNAL MEDICINE
Payer: MEDICARE

## 2020-04-23 ENCOUNTER — APPOINTMENT (OUTPATIENT)
Dept: HEMATOLOGY/ONCOLOGY | Facility: HOSPITAL | Age: 75
End: 2020-04-23
Attending: INTERNAL MEDICINE
Payer: MEDICARE

## 2020-04-24 RX ORDER — METOPROLOL TARTRATE 50 MG/1
25 TABLET, FILM COATED ORAL 2 TIMES DAILY
Qty: 90 TABLET | Refills: 0 | Status: SHIPPED | OUTPATIENT
Start: 2020-04-24 | End: 2020-06-11

## 2020-04-24 NOTE — TELEPHONE ENCOUNTER
Last VISIT 9.27.19 w/ TB  Last PE 9.27.19   Last REFILL 10.30.19 Metoprolol 50mg #90 0R  Last LABS 12.4.19 Lipid, CMP, CBC    Future Appointments   Date Time Provider Nery Cat   7/27/2020 10:15 AM Noah Hsieh MD [address]

## 2020-04-26 ENCOUNTER — PATIENT MESSAGE (OUTPATIENT)
Dept: INTERNAL MEDICINE CLINIC | Facility: CLINIC | Age: 75
End: 2020-04-26

## 2020-04-26 DIAGNOSIS — M25.561 ACUTE PAIN OF RIGHT KNEE: Primary | ICD-10-CM

## 2020-04-27 ENCOUNTER — TELEPHONE (OUTPATIENT)
Dept: ORTHOPEDICS CLINIC | Facility: CLINIC | Age: 75
End: 2020-04-27

## 2020-04-27 NOTE — TELEPHONE ENCOUNTER
Patient calling to schedule an appointment for right knee pain, no prior images. Please advise if we need to order x-rays and then schedule patient for video visit or bring in, in June.

## 2020-04-27 NOTE — TELEPHONE ENCOUNTER
From: Angie Sifuentes  To: Prabhakar Pete MD  Sent: 4/26/2020 9:46 AM CDT  Subject: Non-Urgent Medical Question    Dr. Ines Razo Speaker: Is there an Orthopaedic doctor that you'd recommend?  A few weeks ago I noticed my right knee would hurt when placed in

## 2020-04-28 ENCOUNTER — TELEPHONE (OUTPATIENT)
Dept: ORTHOPEDICS CLINIC | Facility: CLINIC | Age: 75
End: 2020-04-28

## 2020-04-28 DIAGNOSIS — M25.561 RIGHT KNEE PAIN, UNSPECIFIED CHRONICITY: Primary | ICD-10-CM

## 2020-04-28 NOTE — TELEPHONE ENCOUNTER
Patient wants to wait until June to be seen for right knee pain. No prior imaging.  Please add orders to have completed prior to appointment

## 2020-04-29 NOTE — TELEPHONE ENCOUNTER
Last OV 9.27.19 w/ TB (annual pe)   Last PE 9.27.19   Last REFILL 1.20.20 Montelukast 10mg #90 0R  Last LABS 12.4.19 Lipid     Future Appointments   Date Time Provider Nery Cat   5/12/2020 10:00 AM [facility] XR RM5 (BONE) [address] [facility]   6/8/2020 1

## 2020-04-29 NOTE — TELEPHONE ENCOUNTER
X-rays ordered, she should not get them until closer to her appointment time (due to current coronavirus)

## 2020-04-30 RX ORDER — MONTELUKAST SODIUM 10 MG/1
10 TABLET ORAL
Qty: 90 TABLET | Refills: 1 | Status: SHIPPED | OUTPATIENT
Start: 2020-04-30 | End: 2020-09-30

## 2020-05-01 ENCOUNTER — MED REC SCAN ONLY (OUTPATIENT)
Dept: ORTHOPEDICS CLINIC | Facility: CLINIC | Age: 75
End: 2020-05-01

## 2020-05-05 DIAGNOSIS — I26.09 OTHER PULMONARY EMBOLISM WITH ACUTE COR PULMONALE, UNSPECIFIED CHRONICITY (HCC): ICD-10-CM

## 2020-05-05 RX ORDER — APIXABAN 2.5 MG/1
TABLET, FILM COATED ORAL
Qty: 180 TABLET | Refills: 1 | Status: SHIPPED | OUTPATIENT
Start: 2020-05-05 | End: 2020-07-27

## 2020-05-12 ENCOUNTER — HOSPITAL ENCOUNTER (OUTPATIENT)
Dept: GENERAL RADIOLOGY | Facility: HOSPITAL | Age: 75
Discharge: HOME | End: 2020-05-12
Attending: ORTHOPAEDIC SURGERY
Payer: MEDICARE

## 2020-05-12 DIAGNOSIS — M25.561 RIGHT KNEE PAIN, UNSPECIFIED CHRONICITY: ICD-10-CM

## 2020-05-12 PROCEDURE — 73564 X-RAY EXAM KNEE 4 OR MORE: CPT | Performed by: ORTHOPAEDIC SURGERY

## 2020-05-20 ENCOUNTER — OFFICE VISIT (OUTPATIENT)
Dept: ORTHOPEDICS CLINIC | Facility: CLINIC | Age: 75
End: 2020-05-20
Payer: MEDICARE

## 2020-05-20 VITALS
SYSTOLIC BLOOD PRESSURE: 130 MMHG | HEART RATE: 53 BPM | BODY MASS INDEX: 27.82 KG/M2 | HEIGHT: 65.5 IN | OXYGEN SATURATION: 99 % | RESPIRATION RATE: 16 BRPM | WEIGHT: 169 LBS | DIASTOLIC BLOOD PRESSURE: 62 MMHG

## 2020-05-20 DIAGNOSIS — M17.11 PRIMARY OSTEOARTHRITIS OF RIGHT KNEE: Primary | ICD-10-CM

## 2020-05-20 PROCEDURE — 20610 DRAIN/INJ JOINT/BURSA W/O US: CPT | Performed by: ORTHOPAEDIC SURGERY

## 2020-05-20 PROCEDURE — 3078F DIAST BP <80 MM HG: CPT | Performed by: ORTHOPAEDIC SURGERY

## 2020-05-20 PROCEDURE — 3008F BODY MASS INDEX DOCD: CPT | Performed by: ORTHOPAEDIC SURGERY

## 2020-05-20 PROCEDURE — 3075F SYST BP GE 130 - 139MM HG: CPT | Performed by: ORTHOPAEDIC SURGERY

## 2020-05-20 PROCEDURE — 99203 OFFICE O/P NEW LOW 30 MIN: CPT | Performed by: ORTHOPAEDIC SURGERY

## 2020-05-20 RX ORDER — TRAMADOL HYDROCHLORIDE 50 MG/1
50 TABLET ORAL EVERY 8 HOURS PRN
Qty: 30 TABLET | Refills: 0 | Status: SHIPPED | OUTPATIENT
Start: 2020-05-20 | End: 2020-07-27

## 2020-05-20 RX ADMIN — TRIAMCINOLONE ACETONIDE 40 MG: 40 INJECTION, SUSPENSION INTRA-ARTICULAR; INTRAMUSCULAR at 10:58:00

## 2020-05-20 NOTE — PROGRESS NOTES
Per Dr. Princess Francisco, draw up 4 mL of 0.5% Marcaine and 1 mL of Kenalog 40 for injection to right knee. SL  Patient provided education handout for cortisone injection.

## 2020-05-20 NOTE — PATIENT INSTRUCTIONS
What Is Arthritis? Arthritis is a disease that affects the joints. Joints are the parts where bones meet and move. It can affect any joint in your body. There are many types of arthritis.  They include:   · Osteoarthritis  · Rheumatoid arthritis  · Gout  · Cartilage is a smooth substance that protects the ends of your bones and provides cushioning. When you have arthritis, this cartilage breaks down and can no longer protect your bones. This can happen from an autoimmune disease.  Or it can happen from wear a · Strengthening muscles around the joint to reduce the strain on the joint  · Using hot and cold packs on your joints  · Using over-the-counter and prescription medicines  Talk with your healthcare provider about the best treatments for your condition. In people with arthritis, it offers all of those benefits and it can:  · Lessen pain and stiffness  · Strengthen muscles that support your joints  · Help you to be able to do the things you enjoy  A complete program consists of the following 3 types of exe Most people should exercise for at least 30 minutes, most days of the week. You don't have to exercise all at once. Try exercising for 10 minutes, 3 times a day, for example.     Strengthening exercises  Strengthening your muscles helps to protect your join ? Knee bend. Sit in a chair with your legs bent at the knees in front of you. Straighten one leg as much as you can, then bring it back to the floor. Repeat this 5 to 10 times. Then do the same thing with the other leg. ? Ankle stretch.  Sit with your fee · Large  for pencils, garden tools, and other handheld objects    Use mobility and other aids   People with arthritis and other joint problems often use mobility aids to help with walking. For example, they may use canes or walkers.  They may also use · Corticosteroid or steroid injections may ease swelling and pain. The medicine is injected into the joint—for example, the knee or hip. Steroid injections do have risks, so healthcare providers limit the number of injections used in any one joint.    · [place] location at [facility]: [facility] [address]; phone [phone]  o Website: [email or web address]      Date Last Reviewed: 6/1/2018  © 0597-3731 [employer].  [employer] [address] [place] [address]

## 2020-05-21 RX ORDER — TRIAMCINOLONE ACETONIDE 40 MG/ML
40 INJECTION, SUSPENSION INTRA-ARTICULAR; INTRAMUSCULAR ONCE
Status: COMPLETED | OUTPATIENT
Start: 2020-05-21 | End: 2020-05-20

## 2020-05-21 NOTE — H&P
[facility] New Patient Note    CC: Patient presents with:  Consult: right knee pain x mid april.  medial pain/stiffness in lower leg. denies swelling.  ice/elevate. OTC Tylenol- help. no prior tx.  pain with increased ambulation.         HPI: This daily as needed.            Other                   UNKNOWN    Comment:Hay fever  Family History   Problem Relation Age of Onset   • Cancer Father 80        prostate    • Heart Disorder Father    • High Blood Pressure Father    • Other (Other) Father    • C tricompartmental osteophyte formation, medial patellofemoral joint space narrowing      Assessment/Plan:  Diagnoses and all orders for this visit:    Primary osteoarthritis of right knee  -     PHYSICAL THERAPY EXTERNAL  -     DRAIN/INJECT LARGE JOINT/BURS

## 2020-05-21 NOTE — PROCEDURES
After informed consent, the patient's right knee was marked, locally anesthetized with skin refrigerant, prepped with topical antiseptic, and injected with a mixture of 1mL 40mg/mL Kenalog and 4mL 0.5% marcaine through the inferolateral portal by Marty Beal

## 2020-05-22 ENCOUNTER — PATIENT MESSAGE (OUTPATIENT)
Dept: INTERNAL MEDICINE CLINIC | Facility: CLINIC | Age: 75
End: 2020-05-22

## 2020-05-22 DIAGNOSIS — H91.90 DECREASED HEARING, UNSPECIFIED LATERALITY: Primary | ICD-10-CM

## 2020-05-26 NOTE — TELEPHONE ENCOUNTER
TB - pended referral for audiologist, please add dx and referral doctor if ok, please advise. Thank you.

## 2020-05-26 NOTE — TELEPHONE ENCOUNTER
From: Kath Allen  To: Peggy Garcia MD  Sent: 5/22/2020 5:10 PM CDT  Subject: Referral Request    Dear Dr. Anderson Unger: Thank you for the referral of Dr. Cam Benitez -- he has made my knee perfectly comfortable by injection of an anti-inflamatory.  I

## 2020-06-01 ENCOUNTER — MED REC SCAN ONLY (OUTPATIENT)
Dept: ORTHOPEDICS CLINIC | Facility: CLINIC | Age: 75
End: 2020-06-01

## 2020-06-02 ENCOUNTER — TELEPHONE (OUTPATIENT)
Dept: ORTHOPEDICS CLINIC | Facility: CLINIC | Age: 75
End: 2020-06-02

## 2020-06-02 NOTE — TELEPHONE ENCOUNTER
S/w Eloina Jean Baptiste, stated that she was just seen by Dr. Vu Wild on 5/202/2020 for her right knee. Pt stated that her left side is now starting to bother her. Pt stated that the pain is in her groin area, denies pop/click/grinding sensation with walking.   Pt sta

## 2020-06-03 ENCOUNTER — TELEPHONE (OUTPATIENT)
Dept: ORTHOPEDICS CLINIC | Facility: CLINIC | Age: 75
End: 2020-06-03

## 2020-06-03 DIAGNOSIS — R10.32 GROIN PAIN, LEFT: Primary | ICD-10-CM

## 2020-06-03 NOTE — TELEPHONE ENCOUNTER
S/w Gege Crooked and informed her that a left hip x-ray has been ordered. Pt was informed that her x-ray will need to be scheduled prior to her appointment with Dr. Ned Santos on 6/17/2020. Phone number to schedule the x-ray was provided.   No further questions or

## 2020-06-03 NOTE — TELEPHONE ENCOUNTER
Pooja Henderson MD  You; [facility] Clinical Pool 1 hour ago (12:54 PM)      Hip x-rays ordered to be completed and can set up appt for patient also    Routing comment

## 2020-06-03 NOTE — TELEPHONE ENCOUNTER
Patient rt knee is doing great - left hip is now painful. Would like order for treatment of new complaint. Please advise. Patient is coming in 6/17 - pt was informed about xray order.

## 2020-06-09 ENCOUNTER — HOSPITAL ENCOUNTER (OUTPATIENT)
Dept: GENERAL RADIOLOGY | Facility: HOSPITAL | Age: 75
Discharge: HOME | End: 2020-06-09
Attending: ORTHOPAEDIC SURGERY
Payer: MEDICARE

## 2020-06-09 DIAGNOSIS — R10.32 GROIN PAIN, LEFT: ICD-10-CM

## 2020-06-09 PROCEDURE — 73502 X-RAY EXAM HIP UNI 2-3 VIEWS: CPT | Performed by: ORTHOPAEDIC SURGERY

## 2020-06-11 RX ORDER — METOPROLOL TARTRATE 50 MG/1
TABLET, FILM COATED ORAL
Qty: 90 TABLET | Refills: 0 | Status: SHIPPED | OUTPATIENT
Start: 2020-06-11 | End: 2020-08-14

## 2020-06-12 ENCOUNTER — OFFICE VISIT (OUTPATIENT)
Dept: AUDIOLOGY | Facility: CLINIC | Age: 75
End: 2020-06-12
Payer: MEDICARE

## 2020-06-12 DIAGNOSIS — H90.3 SENSORINEURAL HEARING LOSS, BILATERAL: Primary | ICD-10-CM

## 2020-06-12 PROCEDURE — 92567 TYMPANOMETRY: CPT | Performed by: AUDIOLOGIST

## 2020-06-12 PROCEDURE — 92557 COMPREHENSIVE HEARING TEST: CPT | Performed by: AUDIOLOGIST

## 2020-06-12 NOTE — PROGRESS NOTES
AUDIOLOGY REPORT      Kath Allen is a 69year old female     Referring Provider: Peggy Garcia M.D. YOB: 1945  Medical Record: [id number]      Patient Hearing History:  Patient referred by Dr. Peggy Garcia for bilateral hearing loss. understanding people face -to-face, but you would have trouble if distance or visual cues changed. Symptoms of moderate hearing loss include problems hearing normal conversations       Recommendations:   Follow up with Prabhakar Pete MD.  Annual audiograms

## 2020-06-17 ENCOUNTER — OFFICE VISIT (OUTPATIENT)
Dept: ORTHOPEDICS CLINIC | Facility: CLINIC | Age: 75
End: 2020-06-17
Payer: MEDICARE

## 2020-06-17 VITALS — HEART RATE: 56 BPM | OXYGEN SATURATION: 98 %

## 2020-06-17 DIAGNOSIS — M25.552 LEFT HIP PAIN: Primary | ICD-10-CM

## 2020-06-17 DIAGNOSIS — M16.12 PRIMARY OSTEOARTHRITIS OF LEFT HIP: ICD-10-CM

## 2020-06-17 PROCEDURE — 20611 DRAIN/INJ JOINT/BURSA W/US: CPT | Performed by: ORTHOPAEDIC SURGERY

## 2020-06-17 PROCEDURE — 99213 OFFICE O/P EST LOW 20 MIN: CPT | Performed by: ORTHOPAEDIC SURGERY

## 2020-06-17 RX ORDER — TRIAMCINOLONE ACETONIDE 40 MG/ML
40 INJECTION, SUSPENSION INTRA-ARTICULAR; INTRAMUSCULAR ONCE
Status: COMPLETED | OUTPATIENT
Start: 2020-06-17 | End: 2020-06-17

## 2020-06-17 RX ADMIN — TRIAMCINOLONE ACETONIDE 40 MG: 40 INJECTION, SUSPENSION INTRA-ARTICULAR; INTRAMUSCULAR at 10:41:00

## 2020-06-17 NOTE — PROGRESS NOTES
[facility] Progress Note    Subjective: Patient returns for discussion of left hip symptoms.   She was previously seen around 1 month ago for right knee pain, determined to be secondary to osteoarthritis, underwent steroid injection and began physical taking Tylenol (cannot take NSAIDs due to oral anticoagulant use, and tramadol caused side effects), next recommendation would be for intra-articular hip steroid injection for both diagnostic and therapeutic purposes.   Patient expresses understanding and a

## 2020-06-17 NOTE — PROCEDURES
After informed consent, the patient's left hip was marked, prepped with topical antiseptic, and locally anesthetized with skin refrigerant followed by injection of 1% lidocaine to create a skin wheal anteriorly at an insertion site a few fingerbreadths lat

## 2020-06-24 ENCOUNTER — TELEPHONE (OUTPATIENT)
Dept: ORTHOPEDICS CLINIC | Facility: CLINIC | Age: 75
End: 2020-06-24

## 2020-06-24 NOTE — TELEPHONE ENCOUNTER
Patient had complete relief of her hip/groin pain from the injection last week. She feels the best she has felt in 6 months. Advised that if symptoms return to contact our office.  We do have an appt scheduled in August, she can cancel that as the date get

## 2020-06-29 ENCOUNTER — OFFICE VISIT (OUTPATIENT)
Dept: AUDIOLOGY | Facility: CLINIC | Age: 75
End: 2020-06-29
Payer: MEDICARE

## 2020-06-29 DIAGNOSIS — H90.3 SENSORINEURAL HEARING LOSS, BILATERAL: Primary | ICD-10-CM

## 2020-06-29 PROCEDURE — 92591 HEARING AID EXAM, BOTH EARS: CPT | Performed by: AUDIOLOGIST

## 2020-06-29 NOTE — PROGRESS NOTES
Hearing Aid Evaluation    Shukri Lyman  7/29/1945  [id number]    Shukri Lyman is a first time user. [place] feels she has the most difficulty with hearing the TV. Her family notices that she is not hearing well.   She feels it is because they \"mumble

## 2020-07-01 ENCOUNTER — MED REC SCAN ONLY (OUTPATIENT)
Dept: ORTHOPEDICS CLINIC | Facility: CLINIC | Age: 75
End: 2020-07-01

## 2020-07-10 ENCOUNTER — OFFICE VISIT (OUTPATIENT)
Dept: AUDIOLOGY | Facility: CLINIC | Age: 75
End: 2020-07-10

## 2020-07-10 DIAGNOSIS — H90.3 SENSORINEURAL HEARING LOSS, BILATERAL: Primary | ICD-10-CM

## 2020-07-10 PROCEDURE — V5261 HEARING AID, DIGIT, BIN, BTE: HCPCS | Performed by: AUDIOLOGIST

## 2020-07-10 NOTE — PROGRESS NOTES
Hearing Aid [address] purchased two hearing aids. The hearing aid fitting was completed by SANDY Goode.       Hearing Aid Information       Right    Left   Make: Phonak Make: Hesham   Model: Jean Wilkerson M50-R Model: Michael Nguyen

## 2020-07-20 ENCOUNTER — HOSPITAL ENCOUNTER (OUTPATIENT)
Dept: MAMMOGRAPHY | Facility: HOSPITAL | Age: 75
Discharge: HOME | End: 2020-07-20
Attending: INTERNAL MEDICINE
Payer: MEDICARE

## 2020-07-20 DIAGNOSIS — Z12.31 ENCOUNTER FOR SCREENING MAMMOGRAM FOR MALIGNANT NEOPLASM OF BREAST: ICD-10-CM

## 2020-07-20 PROCEDURE — 77067 SCR MAMMO BI INCL CAD: CPT | Performed by: INTERNAL MEDICINE

## 2020-07-20 PROCEDURE — 77063 BREAST TOMOSYNTHESIS BI: CPT | Performed by: INTERNAL MEDICINE

## 2020-07-24 ENCOUNTER — OFFICE VISIT (OUTPATIENT)
Dept: AUDIOLOGY | Facility: CLINIC | Age: 75
End: 2020-07-24

## 2020-07-24 DIAGNOSIS — H90.3 SENSORINEURAL HEARING LOSS, BILATERAL: Primary | ICD-10-CM

## 2020-07-24 PROCEDURE — 92593 HEARING AID CHECK, BOTH EARS: CPT | Performed by: AUDIOLOGIST

## 2020-07-24 NOTE — PROGRESS NOTES
HEARING AID FOLLOW-UP    Shelly Solitario  7/29/1945  [id number]    Patient is here for a routine. The patient has the following concerns:   Patient does not feel she is getting any benefit from the hearing aids.   She reports wearing them everyday for t

## 2020-07-27 ENCOUNTER — OFFICE VISIT (OUTPATIENT)
Dept: HEMATOLOGY/ONCOLOGY | Facility: HOSPITAL | Age: 75
End: 2020-07-27
Attending: INTERNAL MEDICINE
Payer: MEDICARE

## 2020-07-27 VITALS
BODY MASS INDEX: 28.97 KG/M2 | SYSTOLIC BLOOD PRESSURE: 162 MMHG | OXYGEN SATURATION: 97 % | HEART RATE: 51 BPM | WEIGHT: 176 LBS | DIASTOLIC BLOOD PRESSURE: 91 MMHG | RESPIRATION RATE: 16 BRPM | HEIGHT: 65.5 IN | TEMPERATURE: 98 F

## 2020-07-27 DIAGNOSIS — I26.09 OTHER PULMONARY EMBOLISM WITH ACUTE COR PULMONALE, UNSPECIFIED CHRONICITY (HCC): Primary | ICD-10-CM

## 2020-07-27 DIAGNOSIS — Z86.718 HISTORY OF DVT OF LOWER EXTREMITY: ICD-10-CM

## 2020-07-27 DIAGNOSIS — Z79.01 CHRONIC ANTICOAGULATION: ICD-10-CM

## 2020-07-27 DIAGNOSIS — Z71.89 ENCOUNTER FOR ANTICOAGULATION DISCUSSION AND COUNSELING: ICD-10-CM

## 2020-07-27 DIAGNOSIS — D68.52 PROTHROMBIN GENE MUTATION (HCC): ICD-10-CM

## 2020-07-27 LAB
ALBUMIN SERPL-MCNC: 3.5 G/DL (ref 3.4–5)
ALBUMIN/GLOB SERPL: 0.9 {RATIO} (ref 1–2)
ALP LIVER SERPL-CCNC: 56 U/L (ref 55–142)
ALT SERPL-CCNC: 23 U/L (ref 13–56)
ANION GAP SERPL CALC-SCNC: 4 MMOL/L (ref 0–18)
AST SERPL-CCNC: 15 U/L (ref 15–37)
BASOPHILS # BLD AUTO: 0.06 X10(3) UL (ref 0–0.2)
BASOPHILS NFR BLD AUTO: 0.9 %
BILIRUB SERPL-MCNC: 0.5 MG/DL (ref 0.1–2)
BUN BLD-MCNC: 12 MG/DL (ref 7–18)
BUN/CREAT SERPL: 15.4 (ref 10–20)
CALCIUM BLD-MCNC: 9.1 MG/DL (ref 8.5–10.1)
CHLORIDE SERPL-SCNC: 106 MMOL/L (ref 98–112)
CO2 SERPL-SCNC: 26 MMOL/L (ref 21–32)
CREAT BLD-MCNC: 0.78 MG/DL (ref 0.55–1.02)
DEPRECATED RDW RBC AUTO: 44.9 FL (ref 35.1–46.3)
EOSINOPHIL # BLD AUTO: 0.12 X10(3) UL (ref 0–0.7)
EOSINOPHIL NFR BLD AUTO: 1.8 %
ERYTHROCYTE [DISTWIDTH] IN BLOOD BY AUTOMATED COUNT: 13.1 % (ref 11–15)
GLOBULIN PLAS-MCNC: 3.9 G/DL (ref 2.8–4.4)
GLUCOSE BLD-MCNC: 83 MG/DL (ref 70–99)
HCT VFR BLD AUTO: 39.1 % (ref 35–48)
HGB BLD-MCNC: 12.9 G/DL (ref 12–16)
IMM GRANULOCYTES # BLD AUTO: 0.03 X10(3) UL (ref 0–1)
IMM GRANULOCYTES NFR BLD: 0.4 %
LYMPHOCYTES # BLD AUTO: 2.09 X10(3) UL (ref 1–4)
LYMPHOCYTES NFR BLD AUTO: 30.8 %
M PROTEIN MFR SERPL ELPH: 7.4 G/DL (ref 6.4–8.2)
MCH RBC QN AUTO: 30.8 PG (ref 26–34)
MCHC RBC AUTO-ENTMCNC: 33 G/DL (ref 31–37)
MCV RBC AUTO: 93.3 FL (ref 80–100)
MONOCYTES # BLD AUTO: 0.76 X10(3) UL (ref 0.1–1)
MONOCYTES NFR BLD AUTO: 11.2 %
NEUTROPHILS # BLD AUTO: 3.72 X10 (3) UL (ref 1.5–7.7)
NEUTROPHILS # BLD AUTO: 3.72 X10(3) UL (ref 1.5–7.7)
NEUTROPHILS NFR BLD AUTO: 54.9 %
OSMOLALITY SERPL CALC.SUM OF ELEC: 281 MOSM/KG (ref 275–295)
PLATELET # BLD AUTO: 267 10(3)UL (ref 150–450)
POTASSIUM SERPL-SCNC: 4.1 MMOL/L (ref 3.5–5.1)
RBC # BLD AUTO: 4.19 X10(6)UL (ref 3.8–5.3)
SODIUM SERPL-SCNC: 136 MMOL/L (ref 136–145)
WBC # BLD AUTO: 6.8 X10(3) UL (ref 4–11)

## 2020-07-27 PROCEDURE — 99214 OFFICE O/P EST MOD 30 MIN: CPT | Performed by: INTERNAL MEDICINE

## 2020-07-27 NOTE — PROGRESS NOTES
Education Record     Learner:  Patient     Disease / Diagnosis: DVT/PE     Barriers / Limitations:  None                Comments:     Method:  Brief focused                Comments:     General Topics:  Plan of care reviewed                Comments:     Ou

## 2020-07-27 NOTE — PROGRESS NOTES
Hematology Clinic Follow Up Visit    Patient Name: Lawson Petersen  Medical Record Number: [id number]    YOB: 1945    PCP: Dr. Lord Fox  Other providers:  Dr. Angel Grullon (cardiology/vascular)    Reason for Consultation:  Lawson heredia mouth daily as needed. , Disp: , Rfl:   Multiple Vitamins-Minerals (SENTRY SENIOR) Oral Tab, Take by mouth daily. , Disp: , Rfl:   Digestive Enzymes (DIGESTIVE SUPPORT OR), Take by mouth daily as needed.   , Disp: , Rfl:   OCUVITE Oral Tab, Take 1 tablet lb)  09/26/19 : 81.8 kg (180 lb 6.4 oz)  06/19/19 : 81.9 kg (180 lb 9.6 oz)  03/28/19 : 83 kg (183 lb)    Physical Examination:  General: Patient is alert and oriented, not in acute distress  Psych: Mood and affect are appropriate  Eyes: EOMI  ENT: Netta Palomo LUPUS ANTICOAGULANT      Negative Negative   PTT (LUPUS)      25.0-34.0 seconds 276.2 (H)   PTT (Hepzyme)      25-34 seconds 49.0 (H)   STACLOT LA DELTA      <=8.00 seconds 15.30 (H)   DRVVT RATIO      0.00-1.29 1.05   BETA 2 GLYCOPROTEIN 1 AB, IgM      <=

## 2020-08-14 RX ORDER — METOPROLOL TARTRATE 50 MG/1
TABLET, FILM COATED ORAL
Qty: 90 TABLET | Refills: 0 | Status: SHIPPED | OUTPATIENT
Start: 2020-08-14 | End: 2021-02-09

## 2020-08-24 ENCOUNTER — TELEPHONE (OUTPATIENT)
Dept: AUDIOLOGY | Facility: CLINIC | Age: 75
End: 2020-08-24

## 2020-08-24 ENCOUNTER — OFFICE VISIT (OUTPATIENT)
Dept: ORTHOPEDICS CLINIC | Facility: CLINIC | Age: 75
End: 2020-08-24
Payer: MEDICARE

## 2020-08-24 VITALS — HEIGHT: 65.5 IN | RESPIRATION RATE: 16 BRPM | BODY MASS INDEX: 29 KG/M2 | OXYGEN SATURATION: 98 % | HEART RATE: 62 BPM

## 2020-08-24 DIAGNOSIS — M25.552 LEFT HIP PAIN: Primary | ICD-10-CM

## 2020-08-24 PROCEDURE — 99213 OFFICE O/P EST LOW 20 MIN: CPT | Performed by: ORTHOPAEDIC SURGERY

## 2020-08-24 NOTE — TELEPHONE ENCOUNTER
Spoke with patient. Message sent to Rhesa Ganser in billing with question about the status of this refund. Awaiting response and will contact patient when more information is received.     Renee Garzon AUD

## 2020-08-24 NOTE — TELEPHONE ENCOUNTER
Pt states  she returned her hearing aids on 7/24 still awaiting refund.  Pt states Aston says MD submitted request for refund checking the status  please advise

## 2020-08-24 NOTE — PROGRESS NOTES
[facility] Progress Note    Subjective: Patient reports that her left hip symptoms have returned. The injection that we performed just over 2 months ago gave great relief for a month and 1/2 to 2 months, but subsequently returned.   She states that f x-rays of the hip and pelvis to evaluate for any progression of arthritis at the next visit, as rapid progression is possible. We could repeat injection again in 3 to 4 weeks, as that will be the 3-month brianne since her last injection.   However I did discu

## 2020-08-27 NOTE — TELEPHONE ENCOUNTER
Message received from [employer] that refund has been processed and patient will be receiving a check in the amount of $3650. Message sent to patient via [address]       Brenda Perez AUD

## 2020-09-18 ENCOUNTER — OFFICE VISIT (OUTPATIENT)
Dept: ORTHOPEDICS CLINIC | Facility: CLINIC | Age: 75
End: 2020-09-18
Payer: MEDICARE

## 2020-09-18 VITALS — HEART RATE: 52 BPM | DIASTOLIC BLOOD PRESSURE: 82 MMHG | OXYGEN SATURATION: 100 % | SYSTOLIC BLOOD PRESSURE: 150 MMHG

## 2020-09-18 DIAGNOSIS — M16.12 PRIMARY OSTEOARTHRITIS OF LEFT HIP: Primary | ICD-10-CM

## 2020-09-18 PROCEDURE — 20611 DRAIN/INJ JOINT/BURSA W/US: CPT | Performed by: ORTHOPAEDIC SURGERY

## 2020-09-18 RX ORDER — TRIAMCINOLONE ACETONIDE 40 MG/ML
40 INJECTION, SUSPENSION INTRA-ARTICULAR; INTRAMUSCULAR ONCE
Status: COMPLETED | OUTPATIENT
Start: 2020-09-18 | End: 2020-09-18

## 2020-09-18 RX ADMIN — TRIAMCINOLONE ACETONIDE 40 MG: 40 INJECTION, SUSPENSION INTRA-ARTICULAR; INTRAMUSCULAR at 09:55:00

## 2020-09-29 NOTE — TELEPHONE ENCOUNTER
Last Ov: 9/27/19, TB, CPE  Last labs: CBC, CMP 7/27/20  Last Rx: montelukast 10mg, #90, 1R 4/30/20    Future Appointments   Date Time Provider Nery Cat   9/30/2020 11:20 AM Iram Beltre MD [facility]   12/18/2020 10:00 AM Robert Felix

## 2020-09-30 ENCOUNTER — OFFICE VISIT (OUTPATIENT)
Dept: INTERNAL MEDICINE CLINIC | Facility: CLINIC | Age: 75
End: 2020-09-30
Payer: MEDICARE

## 2020-09-30 VITALS
TEMPERATURE: 97 F | HEIGHT: 66.25 IN | WEIGHT: 175 LBS | SYSTOLIC BLOOD PRESSURE: 138 MMHG | DIASTOLIC BLOOD PRESSURE: 70 MMHG | OXYGEN SATURATION: 98 % | BODY MASS INDEX: 28.13 KG/M2 | HEART RATE: 60 BPM

## 2020-09-30 DIAGNOSIS — I10 BENIGN ESSENTIAL HTN: ICD-10-CM

## 2020-09-30 DIAGNOSIS — R04.0 BLEEDING NOSE: ICD-10-CM

## 2020-09-30 DIAGNOSIS — Z00.00 ENCOUNTER FOR ANNUAL HEALTH EXAMINATION: Primary | ICD-10-CM

## 2020-09-30 DIAGNOSIS — Z79.01 CHRONIC ANTICOAGULATION: ICD-10-CM

## 2020-09-30 DIAGNOSIS — E78.00 HIGH CHOLESTEROL: ICD-10-CM

## 2020-09-30 PROCEDURE — G0439 PPPS, SUBSEQ VISIT: HCPCS | Performed by: INTERNAL MEDICINE

## 2020-09-30 RX ORDER — MONTELUKAST SODIUM 10 MG/1
10 TABLET ORAL
Qty: 90 TABLET | Refills: 1 | Status: SHIPPED | OUTPATIENT
Start: 2020-09-30 | End: 2021-03-09

## 2020-09-30 RX ORDER — AMLODIPINE BESYLATE 2.5 MG/1
2.5 TABLET ORAL
Qty: 90 TABLET | Refills: 1 | Status: SHIPPED | OUTPATIENT
Start: 2020-09-30 | End: 2021-02-03

## 2020-09-30 NOTE — PATIENT INSTRUCTIONS
Natalie Martinez's SCREENING SCHEDULE   Tests on this list are recommended by your physician but may not be covered, or covered at this frequency, by your insurer. Please check with your insurance carrier before scheduling to verify coverage.    PREVENTATIV more often if abnormal Colonoscopy due on 07/08/2021 Update Health Maintenance if applicable    Flex Sigmoidoscopy Screen  Covered every 5 years No results found for this or any previous visit. No flowsheet data found.      Fecal Occult Blood   Covered Elizabeth Once after 65 No orders found for this or any previous visit. Please get once after your 65th birthday    Hepatitis B for Moderate/High Risk       No orders found for this or any previous visit.  Medium/high risk factors:   End-stage renal disease   Hemophi

## 2020-09-30 NOTE — PROGRESS NOTES
HPI:   Angie Sifuentes is a 77year old female who presents for a Medicare Subsequent Annual Wellness visit (Pt already had Initial Annual Wellness). Patient is retired. Had hopes for travel but due to covid 19, mostly staying home.   HTN- close to 140s based on screening of functional status. Preparing your meals: Cannot do without help  She has difficulties Managing Money/Bills based on screening of functional status.    Managing money/bills: Cannot do without help  She has difficulties Shopping for Gr 07/27/2020        CBC  (most recent labs)   Lab Results   Component Value Date    WBC 6.8 07/27/2020    HGB 12.9 07/27/2020    .0 07/27/2020        ALLERGIES:   She is allergic to other.     CURRENT MEDICATIONS:     •  Montelukast Sodium 10 MG Oral T is 28.03 kg/m² as calculated from the following:    Height as of this encounter: 66.25\". Weight as of this encounter: 175 lb (79.4 kg).     Medicare Hearing Assessment  (Required for AWV/SWV)    {Hearing finger rub wnl        Visual Acuity grossly wnl Medicare Assessment. PLAN SUMMARY:   Diagnoses and all orders for this visit:    Encounter for annual health examination- patient is utd on mammogram and cscope.  She already had flu vaccine    High cholesterol- heart healthy diet, check labs  -     LIP EKG - w/ Initial Preventative Physical Exam only, or if medically necessary Electrocardiogram date09/27/2019       Colorectal Cancer Screening      Colonoscopy Screen every 10 years Colonoscopy due on 07/08/2021 Update Health Maintenance if applicable    F Medicare Part B No vaccine history found This may be covered with your prescription benefits, but Medicare does not cover unless Medically needed    Zoster  Not covered by Medicare Part B No vaccine history found This may be covered with your pharmacy  pre

## 2020-10-06 ENCOUNTER — LAB ENCOUNTER (OUTPATIENT)
Dept: LAB | Age: 75
End: 2020-10-06
Attending: INTERNAL MEDICINE
Payer: MEDICARE

## 2020-10-06 DIAGNOSIS — R04.0 BLEEDING NOSE: ICD-10-CM

## 2020-10-06 DIAGNOSIS — E78.00 HIGH CHOLESTEROL: ICD-10-CM

## 2020-10-06 PROCEDURE — 80061 LIPID PANEL: CPT

## 2020-10-06 PROCEDURE — 85025 COMPLETE CBC W/AUTO DIFF WBC: CPT

## 2020-10-06 PROCEDURE — 84443 ASSAY THYROID STIM HORMONE: CPT

## 2020-10-09 ENCOUNTER — TELEPHONE (OUTPATIENT)
Dept: INTERNAL MEDICINE CLINIC | Facility: CLINIC | Age: 75
End: 2020-10-09

## 2020-11-24 RX ORDER — NEOMYCIN SULFATE, POLYMYXIN B SULFATE, HYDROCORTISONE 3.5; 10000; 1 MG/ML; [USP'U]/ML; MG/ML
SOLUTION/ DROPS AURICULAR (OTIC)
Qty: 90 ML | Refills: 1 | Status: SHIPPED | OUTPATIENT
Start: 2020-11-24

## 2020-11-24 NOTE — TELEPHONE ENCOUNTER
Last VISIT 09/30/20    Last REFILL 03/29/19 qty 30 mL w/1 refill    Last LABS 10/06/20 TSH, Lipid, CBC done    Future Appointments   Date Time Provider Nery Cat   12/8/2020  9:40 AM Preston Dempsey MD [facility]       Please Approve or D

## 2020-11-24 NOTE — TELEPHONE ENCOUNTER
Pt stated she is now on last bottle and would like a refill. Wants script to go through [facility].   Please advise      Neomycin-Polymyxin-HC 1 % Otic Solution (Discontinued) 30 mL 1 3/29/2019 2019   Si drops to affected ear TID for 10 days

## 2020-12-08 ENCOUNTER — OFFICE VISIT (OUTPATIENT)
Dept: INTERNAL MEDICINE CLINIC | Facility: CLINIC | Age: 75
End: 2020-12-08
Payer: MEDICARE

## 2020-12-08 ENCOUNTER — PATIENT MESSAGE (OUTPATIENT)
Dept: ORTHOPEDICS CLINIC | Facility: CLINIC | Age: 75
End: 2020-12-08

## 2020-12-08 VITALS
SYSTOLIC BLOOD PRESSURE: 134 MMHG | TEMPERATURE: 96 F | DIASTOLIC BLOOD PRESSURE: 70 MMHG | BODY MASS INDEX: 28 KG/M2 | WEIGHT: 177 LBS | HEART RATE: 56 BPM | RESPIRATION RATE: 16 BRPM

## 2020-12-08 DIAGNOSIS — I10 BENIGN ESSENTIAL HTN: Primary | ICD-10-CM

## 2020-12-08 PROCEDURE — 99213 OFFICE O/P EST LOW 20 MIN: CPT | Performed by: INTERNAL MEDICINE

## 2020-12-08 NOTE — TELEPHONE ENCOUNTER
From: Ochoa Little  To: Sahara Lucero MD  Sent: 12/8/2020 5:53 AM CST  Subject: Non-Urgent Medical Question    Dr. Armstrong Mahesh office:    If there's a chance to move my appointment sooner than the 18th of this month, please let me know.  I experiencing pa

## 2020-12-08 NOTE — PROGRESS NOTES
Emely Montez is a 77year old female. Patient presents with:  HTN: SN Rm 3; F/U, notes improvement      HPI:     Patient here for HTN follow up. Checking at home and BP in normal range, no SE on low dose amlodipine. Feels overall better.   Volunteering a Alcohol/week: 0.0 standard drinks      Comment: 3 x year    Drug use: No    Family History   Problem Relation Age of Onset   • Cancer Father 80        prostate    • Heart Disorder Father    • High Blood Pressure Father    • Other (Other) Father    • Cancer

## 2020-12-17 ENCOUNTER — TELEPHONE (OUTPATIENT)
Dept: ORTHOPEDICS CLINIC | Facility: CLINIC | Age: 75
End: 2020-12-17

## 2020-12-18 ENCOUNTER — OFFICE VISIT (OUTPATIENT)
Dept: ORTHOPEDICS CLINIC | Facility: CLINIC | Age: 75
End: 2020-12-18
Payer: MEDICARE

## 2020-12-18 ENCOUNTER — TELEPHONE (OUTPATIENT)
Dept: INTERNAL MEDICINE CLINIC | Facility: CLINIC | Age: 75
End: 2020-12-18

## 2020-12-18 VITALS — OXYGEN SATURATION: 99 % | HEART RATE: 58 BPM

## 2020-12-18 DIAGNOSIS — M16.12 PRIMARY OSTEOARTHRITIS OF LEFT HIP: Primary | ICD-10-CM

## 2020-12-18 DIAGNOSIS — M17.11 PRIMARY OSTEOARTHRITIS OF RIGHT KNEE: ICD-10-CM

## 2020-12-18 PROCEDURE — 99212 OFFICE O/P EST SF 10 MIN: CPT | Performed by: ORTHOPAEDIC SURGERY

## 2020-12-18 NOTE — TELEPHONE ENCOUNTER
Pt stopped in office today as she was downstairs for something to let TB know she had another bloody nose this AM-lasted under 10 minutes-she was told to call TB right away when it happens again-offered to speak to nurse and pt declined and said to just ha

## 2020-12-18 NOTE — PROGRESS NOTES
[facility] Progress Note    Subjective: Patient returns for evaluation of left hip and right knee.   She states that the injection we performed for the right knee 7 months ago, as well as the injection we performed for the left hip 3 months ago, are s

## 2020-12-18 NOTE — TELEPHONE ENCOUNTER
LOV 12/8/20 with TB. Last Labs cbc, lipid and TSH with reflex 10/6/20.  Copied notes:    ASSESSMENT AND PLAN:   Benign essential htn  (primary encounter diagnosis)   -BP well controlled with addition of amlodipine 2.5mg daily to metoprolol  -continue heart

## 2020-12-19 ENCOUNTER — TELEPHONE (OUTPATIENT)
Dept: INTERNAL MEDICINE CLINIC | Facility: CLINIC | Age: 75
End: 2020-12-19

## 2020-12-19 DIAGNOSIS — R04.0 BLEEDING FROM THE NOSE: Primary | ICD-10-CM

## 2020-12-19 NOTE — TELEPHONE ENCOUNTER
Left long message to her about going to [facility] or  if another prolonged nosebleed over the weekend. Advised to see ENT for recurrent nose bleeds- referral placed. Please see how she is doing on Monday.

## 2020-12-20 ENCOUNTER — IMMUNIZATION (OUTPATIENT)
Dept: LAB | Facility: HOSPITAL | Age: 75
End: 2020-12-20
Attending: PREVENTIVE MEDICINE
Payer: MEDICARE

## 2020-12-20 DIAGNOSIS — Z23 NEED FOR VACCINATION: ICD-10-CM

## 2020-12-20 PROCEDURE — 0001A PFIZER-BIONTECH COVID-19 VACCINE: CPT

## 2020-12-21 DIAGNOSIS — I26.09 OTHER PULMONARY EMBOLISM WITH ACUTE COR PULMONALE, UNSPECIFIED CHRONICITY (HCC): Primary | ICD-10-CM

## 2020-12-21 DIAGNOSIS — R04.0 EPISTAXIS: ICD-10-CM

## 2020-12-21 DIAGNOSIS — Z79.01 CHRONIC ANTICOAGULATION: ICD-10-CM

## 2020-12-21 NOTE — TELEPHONE ENCOUNTER
TB,  Pt states she has not had any further nosebleeds, received your message, states she will talk to Dr Andrew Levine first then see ENT. FYI to TB.

## 2020-12-22 ENCOUNTER — NURSE ONLY (OUTPATIENT)
Dept: HEMATOLOGY/ONCOLOGY | Facility: HOSPITAL | Age: 75
End: 2020-12-22
Attending: INTERNAL MEDICINE
Payer: MEDICARE

## 2020-12-22 DIAGNOSIS — R04.0 EPISTAXIS: ICD-10-CM

## 2020-12-22 DIAGNOSIS — I26.09 OTHER PULMONARY EMBOLISM WITH ACUTE COR PULMONALE, UNSPECIFIED CHRONICITY (HCC): ICD-10-CM

## 2020-12-22 DIAGNOSIS — Z79.01 CHRONIC ANTICOAGULATION: ICD-10-CM

## 2020-12-22 PROCEDURE — 80053 COMPREHEN METABOLIC PANEL: CPT

## 2020-12-22 PROCEDURE — 36415 COLL VENOUS BLD VENIPUNCTURE: CPT

## 2020-12-22 PROCEDURE — 85025 COMPLETE CBC W/AUTO DIFF WBC: CPT

## 2020-12-30 ENCOUNTER — HOSPITAL ENCOUNTER (EMERGENCY)
Facility: HOSPITAL | Age: 75
Discharge: HOME | End: 2020-12-30
Attending: EMERGENCY MEDICINE
Payer: MEDICARE

## 2020-12-30 VITALS
TEMPERATURE: 98 F | DIASTOLIC BLOOD PRESSURE: 83 MMHG | RESPIRATION RATE: 18 BRPM | BODY MASS INDEX: 28 KG/M2 | SYSTOLIC BLOOD PRESSURE: 184 MMHG | HEART RATE: 61 BPM | OXYGEN SATURATION: 96 % | WEIGHT: 176.38 LBS

## 2020-12-30 DIAGNOSIS — R04.0 EPISTAXIS: Primary | ICD-10-CM

## 2020-12-30 PROCEDURE — 99282 EMERGENCY DEPT VISIT SF MDM: CPT

## 2020-12-30 NOTE — ED PROVIDER NOTES
Patient Seen in: [facility] Emergency Department      History   Patient presents with:  Nose Bleed    Stated Complaint: nosebleed started 1 hour ago, minimal bleeding.  appointment with ENT for same p*    HPI    71-year-old with past medical history 96%   BMI 28.25 kg/m²         Physical Exam  Vitals signs and nursing note reviewed. Constitutional:       Appearance: Normal appearance. HENT:      Head: Normocephalic and atraumatic. Nose: Nose normal.      Comments: Normal right nare.   Left radha MD  [address] [place], [place] [id number] [phone]    Call            Medications Prescribed:  Current Discharge Medication List

## 2020-12-30 NOTE — ED INITIAL ASSESSMENT (HPI)
Nose bleed that has been controlled with nose clip while in waiting room. Started an hour prior to arrival. History of nose bleed problems and has appointment with ENT on 1/11 to be seen.

## 2021-01-10 ENCOUNTER — IMMUNIZATION (OUTPATIENT)
Dept: LAB | Facility: HOSPITAL | Age: 76
End: 2021-01-10
Attending: PREVENTIVE MEDICINE
Payer: MEDICARE

## 2021-01-10 DIAGNOSIS — Z23 NEED FOR VACCINATION: ICD-10-CM

## 2021-01-10 PROCEDURE — 0002A SARSCOV2 VAC 30MCG/0.3ML IM: CPT

## 2021-01-27 ENCOUNTER — OFFICE VISIT (OUTPATIENT)
Dept: HEMATOLOGY/ONCOLOGY | Facility: HOSPITAL | Age: 76
End: 2021-01-27
Attending: INTERNAL MEDICINE
Payer: MEDICARE

## 2021-01-27 VITALS
HEART RATE: 76 BPM | RESPIRATION RATE: 16 BRPM | SYSTOLIC BLOOD PRESSURE: 146 MMHG | OXYGEN SATURATION: 97 % | HEIGHT: 65.51 IN | TEMPERATURE: 98 F | DIASTOLIC BLOOD PRESSURE: 76 MMHG | WEIGHT: 179 LBS | BODY MASS INDEX: 29.46 KG/M2

## 2021-01-27 DIAGNOSIS — I26.09 OTHER PULMONARY EMBOLISM WITH ACUTE COR PULMONALE, UNSPECIFIED CHRONICITY (HCC): ICD-10-CM

## 2021-01-27 DIAGNOSIS — Z71.89 ENCOUNTER FOR ANTICOAGULATION DISCUSSION AND COUNSELING: Primary | ICD-10-CM

## 2021-01-27 DIAGNOSIS — D68.52 PROTHROMBIN GENE MUTATION (HCC): ICD-10-CM

## 2021-01-27 DIAGNOSIS — Z79.01 CHRONIC ANTICOAGULATION: ICD-10-CM

## 2021-01-27 DIAGNOSIS — Z86.718 HISTORY OF DVT OF LOWER EXTREMITY: ICD-10-CM

## 2021-01-27 LAB
ALBUMIN SERPL-MCNC: 3.6 G/DL (ref 3.4–5)
ALBUMIN/GLOB SERPL: 0.9 {RATIO} (ref 1–2)
ALP LIVER SERPL-CCNC: 60 U/L
ALT SERPL-CCNC: 26 U/L
ANION GAP SERPL CALC-SCNC: 6 MMOL/L (ref 0–18)
AST SERPL-CCNC: 22 U/L (ref 15–37)
BASOPHILS # BLD AUTO: 0.07 X10(3) UL (ref 0–0.2)
BASOPHILS NFR BLD AUTO: 1 %
BILIRUB SERPL-MCNC: 0.4 MG/DL (ref 0.1–2)
BUN BLD-MCNC: 9 MG/DL (ref 7–18)
BUN/CREAT SERPL: 10.5 (ref 10–20)
CALCIUM BLD-MCNC: 9.3 MG/DL (ref 8.5–10.1)
CHLORIDE SERPL-SCNC: 105 MMOL/L (ref 98–112)
CO2 SERPL-SCNC: 24 MMOL/L (ref 21–32)
CREAT BLD-MCNC: 0.86 MG/DL
DEPRECATED RDW RBC AUTO: 42.2 FL (ref 35.1–46.3)
EOSINOPHIL # BLD AUTO: 0.09 X10(3) UL (ref 0–0.7)
EOSINOPHIL NFR BLD AUTO: 1.3 %
ERYTHROCYTE [DISTWIDTH] IN BLOOD BY AUTOMATED COUNT: 12.3 % (ref 11–15)
GLOBULIN PLAS-MCNC: 3.9 G/DL (ref 2.8–4.4)
GLUCOSE BLD-MCNC: 138 MG/DL (ref 70–99)
HCT VFR BLD AUTO: 38.6 %
HGB BLD-MCNC: 12.9 G/DL
IMM GRANULOCYTES # BLD AUTO: 0.01 X10(3) UL (ref 0–1)
IMM GRANULOCYTES NFR BLD: 0.1 %
LYMPHOCYTES # BLD AUTO: 2.16 X10(3) UL (ref 1–4)
LYMPHOCYTES NFR BLD AUTO: 30.6 %
M PROTEIN MFR SERPL ELPH: 7.5 G/DL (ref 6.4–8.2)
MCH RBC QN AUTO: 31.1 PG (ref 26–34)
MCHC RBC AUTO-ENTMCNC: 33.4 G/DL (ref 31–37)
MCV RBC AUTO: 93 FL
MONOCYTES # BLD AUTO: 0.66 X10(3) UL (ref 0.1–1)
MONOCYTES NFR BLD AUTO: 9.4 %
NEUTROPHILS # BLD AUTO: 4.06 X10 (3) UL (ref 1.5–7.7)
NEUTROPHILS # BLD AUTO: 4.06 X10(3) UL (ref 1.5–7.7)
NEUTROPHILS NFR BLD AUTO: 57.6 %
OSMOLALITY SERPL CALC.SUM OF ELEC: 281 MOSM/KG (ref 275–295)
PLATELET # BLD AUTO: 266 10(3)UL (ref 150–450)
POTASSIUM SERPL-SCNC: 4.1 MMOL/L (ref 3.5–5.1)
RBC # BLD AUTO: 4.15 X10(6)UL
SODIUM SERPL-SCNC: 135 MMOL/L (ref 136–145)
WBC # BLD AUTO: 7.1 X10(3) UL (ref 4–11)

## 2021-01-27 PROCEDURE — 99214 OFFICE O/P EST MOD 30 MIN: CPT | Performed by: INTERNAL MEDICINE

## 2021-01-27 NOTE — PROGRESS NOTES
Hematology Clinic Follow Up Visit    Patient Name: Camelia Rader  Medical Record Number: [id number]   YOB: 1945    PCP: Dr. Genesis Cook  Other providers:  Dr. Surya Huntley (cardiology/vascular)    Reason for Consultation:  Camelia Rader was tablet, Rfl: 1    •  METOPROLOL TARTRATE 50 MG Oral Tab, TAKE ONE-HALF TABLET BY  MOUTH TWICE DAILY, Disp: 90 tablet, Rfl: 0    •  apixaban (ELIQUIS) 2.5 MG Oral Tab, Take 1 tablet (2.5 mg total) by mouth 2 (two) times daily. , Disp: 180 tablet, Rfl: 2    • 1021)    Wt Readings from Last 6 Encounters:  01/27/21 : 81.2 kg (179 lb)  12/30/20 : 80 kg (176 lb 5.9 oz)  12/08/20 : 80.3 kg (177 lb)  09/30/20 : 79.4 kg (175 lb)  07/27/20 : 79.8 kg (176 lb)  05/20/20 : 76.7 kg (169 lb)    Physical Examination:  Shana Dugan anticoagulant. Repeat testing in 12 weeks is recommended to rule out transient antibodies that are considered non-pathologic. . . .    DRVVT LUPUS ANTICOAGULANT      Negative Negative   PTT (LUPUS)      25.0-34.0 seconds 276.2 (H)   PTT (Hepzyme)      25-34

## 2021-01-27 NOTE — PROGRESS NOTES
Education Record     Learner:  Patient     Disease / Diagnosis: DVT/PE     Barriers / Malka Wong                PPTTQSFG:     Method:  Brief focused                SDXLFKOW:      Neil Muskrat of care reviewed                AOCARMELEWDS:     Jesika

## 2021-02-03 DIAGNOSIS — I10 BENIGN ESSENTIAL HTN: ICD-10-CM

## 2021-02-03 RX ORDER — AMLODIPINE BESYLATE 2.5 MG/1
TABLET ORAL
Qty: 90 TABLET | Refills: 1 | Status: SHIPPED | OUTPATIENT
Start: 2021-02-03 | End: 2021-06-01

## 2021-02-10 RX ORDER — METOPROLOL TARTRATE 50 MG/1
25 TABLET, FILM COATED ORAL 2 TIMES DAILY
Qty: 90 TABLET | Refills: 0 | Status: SHIPPED | OUTPATIENT
Start: 2021-02-10 | End: 2021-04-13

## 2021-02-17 DIAGNOSIS — I26.09 OTHER PULMONARY EMBOLISM WITH ACUTE COR PULMONALE, UNSPECIFIED CHRONICITY (HCC): ICD-10-CM

## 2021-03-09 RX ORDER — MONTELUKAST SODIUM 10 MG/1
TABLET ORAL
Qty: 90 TABLET | Refills: 1 | Status: SHIPPED | OUTPATIENT
Start: 2021-03-09 | End: 2021-08-17

## 2021-03-09 NOTE — TELEPHONE ENCOUNTER
Last VISIT 12/08/20    Last REFILL 09/30/20 qty 90 w/1 refill    Last LABS 01/27/21 CBC, CMP done    No Future Appointments      Per PROTOCOL? Failed    Please Approve or Deny.

## 2021-04-13 RX ORDER — METOPROLOL TARTRATE 50 MG/1
TABLET, FILM COATED ORAL
Qty: 90 TABLET | Refills: 1 | Status: SHIPPED | OUTPATIENT
Start: 2021-04-13 | End: 2021-08-27

## 2021-05-19 ENCOUNTER — OFFICE VISIT (OUTPATIENT)
Dept: ORTHOPEDICS CLINIC | Facility: CLINIC | Age: 76
End: 2021-05-19
Payer: MEDICARE

## 2021-05-19 VITALS — SYSTOLIC BLOOD PRESSURE: 140 MMHG | HEART RATE: 59 BPM | OXYGEN SATURATION: 99 % | DIASTOLIC BLOOD PRESSURE: 80 MMHG

## 2021-05-19 DIAGNOSIS — M16.12 PRIMARY OSTEOARTHRITIS OF LEFT HIP: Primary | ICD-10-CM

## 2021-05-19 PROCEDURE — 20611 DRAIN/INJ JOINT/BURSA W/US: CPT | Performed by: ORTHOPAEDIC SURGERY

## 2021-05-19 RX ORDER — TRIAMCINOLONE ACETONIDE 40 MG/ML
40 INJECTION, SUSPENSION INTRA-ARTICULAR; INTRAMUSCULAR ONCE
Status: COMPLETED | OUTPATIENT
Start: 2021-05-19 | End: 2021-05-19

## 2021-05-19 RX ADMIN — TRIAMCINOLONE ACETONIDE 40 MG: 40 INJECTION, SUSPENSION INTRA-ARTICULAR; INTRAMUSCULAR at 08:59:00

## 2021-05-27 ENCOUNTER — HOSPITAL ENCOUNTER (OUTPATIENT)
Age: 76
Discharge: HOME | End: 2021-05-27
Payer: MEDICARE

## 2021-05-27 VITALS
DIASTOLIC BLOOD PRESSURE: 87 MMHG | OXYGEN SATURATION: 97 % | HEIGHT: 66 IN | HEART RATE: 51 BPM | RESPIRATION RATE: 16 BRPM | BODY MASS INDEX: 27.32 KG/M2 | WEIGHT: 170 LBS | TEMPERATURE: 98 F | SYSTOLIC BLOOD PRESSURE: 149 MMHG

## 2021-05-27 DIAGNOSIS — L30.9 DERMATITIS: Primary | ICD-10-CM

## 2021-05-27 PROCEDURE — 99213 OFFICE O/P EST LOW 20 MIN: CPT | Performed by: NURSE PRACTITIONER

## 2021-05-27 RX ORDER — PREDNISONE 20 MG/1
40 TABLET ORAL DAILY
Qty: 10 TABLET | Refills: 0 | Status: SHIPPED | OUTPATIENT
Start: 2021-05-27 | End: 2021-06-01

## 2021-05-27 NOTE — ED INITIAL ASSESSMENT (HPI)
Pt c/o a spreading rash that started 9 days ago. Pt c/o red rash to her neck and upper trunk. Pt rash is itchy not painful.

## 2021-05-27 NOTE — ED PROVIDER NOTES
Patient Seen in: Immediate [address] [place] [address]      History   Patient presents with:  Rash Skin Problem    Stated Complaint: Rash    HPI/Subjective: This is a 64-year-old female with below stated medical history.   Presents to immediate care for 9 day noted above.     Physical Exam     ED Triage Vitals [05/27/21 1043]   /87   Pulse 51   Resp 16   Temp 98 °F (36.7 °C)   Temp src    SpO2 97 %   O2 Device        Current:/87   Pulse 51   Temp 98 °F (36.7 °C)   Resp 16   Ht 167.6 cm (5' 6\")   Wt well-appearing and nontoxic-appearing. Presents to immediate care for generalized itchy rash on the right side of neck, right side and left side of abdomen. Maculopapular rash noted with no surrounding erythema to suggest cellulitis.   Rashes on both side

## 2021-05-31 DIAGNOSIS — I10 BENIGN ESSENTIAL HTN: ICD-10-CM

## 2021-06-01 RX ORDER — AMLODIPINE BESYLATE 2.5 MG/1
TABLET ORAL
Qty: 90 TABLET | Refills: 0 | Status: SHIPPED | OUTPATIENT
Start: 2021-06-01 | End: 2021-08-23

## 2021-07-21 ENCOUNTER — OFFICE VISIT (OUTPATIENT)
Dept: HEMATOLOGY/ONCOLOGY | Facility: HOSPITAL | Age: 76
End: 2021-07-21
Attending: INTERNAL MEDICINE
Payer: MEDICARE

## 2021-07-21 VITALS
OXYGEN SATURATION: 94 % | SYSTOLIC BLOOD PRESSURE: 154 MMHG | TEMPERATURE: 98 F | WEIGHT: 179 LBS | DIASTOLIC BLOOD PRESSURE: 80 MMHG | RESPIRATION RATE: 16 BRPM | HEART RATE: 66 BPM | BODY MASS INDEX: 29 KG/M2

## 2021-07-21 DIAGNOSIS — I26.09 OTHER PULMONARY EMBOLISM WITH ACUTE COR PULMONALE, UNSPECIFIED CHRONICITY (HCC): ICD-10-CM

## 2021-07-21 DIAGNOSIS — Z79.01 CHRONIC ANTICOAGULATION: ICD-10-CM

## 2021-07-21 DIAGNOSIS — Z71.89 ENCOUNTER FOR ANTICOAGULATION DISCUSSION AND COUNSELING: Primary | ICD-10-CM

## 2021-07-21 DIAGNOSIS — Z86.718 HISTORY OF DVT OF LOWER EXTREMITY: ICD-10-CM

## 2021-07-21 DIAGNOSIS — D68.52 PROTHROMBIN GENE MUTATION (HCC): ICD-10-CM

## 2021-07-21 LAB
ALBUMIN SERPL-MCNC: 3.5 G/DL (ref 3.4–5)
ALBUMIN/GLOB SERPL: 0.9 {RATIO} (ref 1–2)
ALP LIVER SERPL-CCNC: 56 U/L
ALT SERPL-CCNC: 33 U/L
ANION GAP SERPL CALC-SCNC: 6 MMOL/L (ref 0–18)
AST SERPL-CCNC: 19 U/L (ref 15–37)
BASOPHILS # BLD AUTO: 0.08 X10(3) UL (ref 0–0.2)
BASOPHILS NFR BLD AUTO: 1.1 %
BILIRUB SERPL-MCNC: 0.4 MG/DL (ref 0.1–2)
BUN BLD-MCNC: 10 MG/DL (ref 7–18)
BUN/CREAT SERPL: 13.3 (ref 10–20)
CALCIUM BLD-MCNC: 8.7 MG/DL (ref 8.5–10.1)
CHLORIDE SERPL-SCNC: 106 MMOL/L (ref 98–112)
CO2 SERPL-SCNC: 23 MMOL/L (ref 21–32)
CREAT BLD-MCNC: 0.75 MG/DL
DEPRECATED RDW RBC AUTO: 43.6 FL (ref 35.1–46.3)
EOSINOPHIL # BLD AUTO: 0.13 X10(3) UL (ref 0–0.7)
EOSINOPHIL NFR BLD AUTO: 1.8 %
ERYTHROCYTE [DISTWIDTH] IN BLOOD BY AUTOMATED COUNT: 13 % (ref 11–15)
GLOBULIN PLAS-MCNC: 3.7 G/DL (ref 2.8–4.4)
GLUCOSE BLD-MCNC: 111 MG/DL (ref 70–99)
HCT VFR BLD AUTO: 38 %
HGB BLD-MCNC: 12.8 G/DL
IMM GRANULOCYTES # BLD AUTO: 0.04 X10(3) UL (ref 0–1)
IMM GRANULOCYTES NFR BLD: 0.6 %
LYMPHOCYTES # BLD AUTO: 2.15 X10(3) UL (ref 1–4)
LYMPHOCYTES NFR BLD AUTO: 30.1 %
M PROTEIN MFR SERPL ELPH: 7.2 G/DL (ref 6.4–8.2)
MCH RBC QN AUTO: 31.3 PG (ref 26–34)
MCHC RBC AUTO-ENTMCNC: 33.7 G/DL (ref 31–37)
MCV RBC AUTO: 92.9 FL
MONOCYTES # BLD AUTO: 0.73 X10(3) UL (ref 0.1–1)
MONOCYTES NFR BLD AUTO: 10.2 %
NEUTROPHILS # BLD AUTO: 4.01 X10 (3) UL (ref 1.5–7.7)
NEUTROPHILS # BLD AUTO: 4.01 X10(3) UL (ref 1.5–7.7)
NEUTROPHILS NFR BLD AUTO: 56.2 %
OSMOLALITY SERPL CALC.SUM OF ELEC: 280 MOSM/KG (ref 275–295)
PATIENT FASTING Y/N/NP: NO
PLATELET # BLD AUTO: 268 10(3)UL (ref 150–450)
POTASSIUM SERPL-SCNC: 4.1 MMOL/L (ref 3.5–5.1)
RBC # BLD AUTO: 4.09 X10(6)UL
SODIUM SERPL-SCNC: 135 MMOL/L (ref 136–145)
WBC # BLD AUTO: 7.1 X10(3) UL (ref 4–11)

## 2021-07-21 PROCEDURE — 99214 OFFICE O/P EST MOD 30 MIN: CPT | Performed by: INTERNAL MEDICINE

## 2021-07-21 NOTE — PROGRESS NOTES
Hematology Clinic Follow Up Visit    Patient Name: Obdulia Velasquez  Medical Record Number: [id number]   YOB: 1945    PCP: Dr. Jose Alberto Bautista  Other providers:  Dr. Colton Anderson (cardiology/vascular)    Reason for Consultation:  Obdulia Velasquez was TID for 10 days, may repeat course as directed, Disp: 90 mL, Rfl: 1  Multiple Vitamins-Minerals (SENTRY SENIOR) Oral Tab, Take by mouth daily. , Disp: , Rfl:   OCUVITE Oral Tab, Take 1 tablet by mouth daily as needed.   , Disp: , Rfl:       Allergies: distress  Psych: Mood and affect are appropriate  Eyes: EOMI  ENT: Oropharynx is clear  CV: Regular rate and rhythm, no murmurs  Respiratory: Lungs clear to auscultation bilaterally  GI/Abd: Soft, non-tender with normoactive bowel sounds, no hepatosplenome 15.30 (H)   DRVVT RATIO      0.00-1.29 1.05   BETA 2 GLYCOPROTEIN 1 AB, IgM      <=15.0 U/mL <3.7   BETA 2 GLYCOPROTEIN 1 AB, IgG      <=15.0 U/mL <3.7   PHOSPHOLIPID AB, IgM      Negative Negative   PHOSPHOLIPID AB, IgG      Negative Negative     Componen

## 2021-07-21 NOTE — PROGRESS NOTES
Education Record     Learner:  Patient     Disease / Diagnosis: DVT/PE     Barriers / Starleen Janusz                OBJJHUPS:     Method:  Brief focused                FCRDOBOI:     General Indu Carmine of care reviewed                WATALATW:     Ou

## 2021-07-28 ENCOUNTER — APPOINTMENT (OUTPATIENT)
Dept: HEMATOLOGY/ONCOLOGY | Facility: HOSPITAL | Age: 76
End: 2021-07-28
Attending: INTERNAL MEDICINE
Payer: MEDICARE

## 2021-08-17 RX ORDER — MONTELUKAST SODIUM 10 MG/1
TABLET ORAL
Qty: 90 TABLET | Refills: 1 | Status: SHIPPED | OUTPATIENT
Start: 2021-08-17 | End: 2022-01-19

## 2021-08-17 NOTE — TELEPHONE ENCOUNTER
Last VISIT 12/08/20    Last CPE 09/30/20    Last REFILL 03/09/21 qty 90 w/1 refill    Last LABS 07/21/21 CBC, CMP done    Future Appointments   Date Time Provider Nery Cat   9/3/2021 10:00 AM REFERENCE [id number]   9/10/2021 10:40

## 2021-08-20 DIAGNOSIS — I10 BENIGN ESSENTIAL HTN: ICD-10-CM

## 2021-08-23 RX ORDER — AMLODIPINE BESYLATE 2.5 MG/1
TABLET ORAL
Qty: 90 TABLET | Refills: 1 | Status: SHIPPED | OUTPATIENT
Start: 2021-08-23 | End: 2022-01-26

## 2021-08-27 RX ORDER — METOPROLOL TARTRATE 50 MG/1
TABLET, FILM COATED ORAL
Qty: 90 TABLET | Refills: 0 | Status: SHIPPED | OUTPATIENT
Start: 2021-08-27 | End: 2021-11-15

## 2021-09-02 ENCOUNTER — TELEPHONE (OUTPATIENT)
Dept: INTERNAL MEDICINE CLINIC | Facility: CLINIC | Age: 76
End: 2021-09-02

## 2021-09-02 DIAGNOSIS — Z00.00 ENCOUNTER FOR ANNUAL HEALTH EXAMINATION: Primary | ICD-10-CM

## 2021-09-02 DIAGNOSIS — E78.00 PURE HYPERCHOLESTEROLEMIA: ICD-10-CM

## 2021-09-02 DIAGNOSIS — I10 BENIGN ESSENTIAL HTN: ICD-10-CM

## 2021-09-02 NOTE — TELEPHONE ENCOUNTER
Routine labs placed to [facility] per protocol. Upcoming CPE w/ TB 9/10/21. CBC and CMP completed 7/21/21. Additional labs required? Please advise. Thank you.

## 2021-09-02 NOTE — TELEPHONE ENCOUNTER
Future Appointments   Date Time Provider Nery Cat   9/3/2021 10:00 AM REFERENCE [id number] Ref [address]     Patient is scheduled for Medicare Wellness Physical.  Please place orders with [facility]. Patient aware to fast.  No call back required.

## 2021-09-03 ENCOUNTER — LAB ENCOUNTER (OUTPATIENT)
Dept: LAB | Age: 76
End: 2021-09-03
Attending: INTERNAL MEDICINE
Payer: MEDICARE

## 2021-09-03 DIAGNOSIS — E78.00 PURE HYPERCHOLESTEROLEMIA: ICD-10-CM

## 2021-09-03 DIAGNOSIS — I10 BENIGN ESSENTIAL HTN: ICD-10-CM

## 2021-09-03 DIAGNOSIS — Z00.00 ENCOUNTER FOR ANNUAL HEALTH EXAMINATION: ICD-10-CM

## 2021-09-03 LAB
CHOLEST SMN-MCNC: 247 MG/DL (ref ?–200)
HDLC SERPL-MCNC: 57 MG/DL (ref 40–59)
LDLC SERPL CALC-MCNC: 163 MG/DL (ref ?–100)
NONHDLC SERPL-MCNC: 190 MG/DL (ref ?–130)
PATIENT FASTING Y/N/NP: YES
TRIGL SERPL-MCNC: 150 MG/DL (ref 30–149)
TSI SER-ACNC: 1.35 MIU/ML (ref 0.36–3.74)
VLDLC SERPL CALC-MCNC: 29 MG/DL (ref 0–30)

## 2021-09-03 PROCEDURE — 80061 LIPID PANEL: CPT

## 2021-09-03 PROCEDURE — 36415 COLL VENOUS BLD VENIPUNCTURE: CPT

## 2021-09-03 PROCEDURE — 84443 ASSAY THYROID STIM HORMONE: CPT

## 2021-09-10 ENCOUNTER — OFFICE VISIT (OUTPATIENT)
Dept: INTERNAL MEDICINE CLINIC | Facility: CLINIC | Age: 76
End: 2021-09-10
Payer: MEDICARE

## 2021-09-10 VITALS
BODY MASS INDEX: 29.28 KG/M2 | DIASTOLIC BLOOD PRESSURE: 80 MMHG | RESPIRATION RATE: 16 BRPM | SYSTOLIC BLOOD PRESSURE: 140 MMHG | TEMPERATURE: 97 F | WEIGHT: 180 LBS | HEIGHT: 65.75 IN | OXYGEN SATURATION: 97 % | HEART RATE: 54 BPM

## 2021-09-10 DIAGNOSIS — Z12.31 ENCOUNTER FOR SCREENING MAMMOGRAM FOR MALIGNANT NEOPLASM OF BREAST: ICD-10-CM

## 2021-09-10 DIAGNOSIS — Z79.01 CHRONIC ANTICOAGULATION: ICD-10-CM

## 2021-09-10 DIAGNOSIS — E78.49 OTHER HYPERLIPIDEMIA: ICD-10-CM

## 2021-09-10 DIAGNOSIS — Z00.00 ENCOUNTER FOR ANNUAL HEALTH EXAMINATION: Primary | ICD-10-CM

## 2021-09-10 DIAGNOSIS — Z12.11 SCREEN FOR COLON CANCER: ICD-10-CM

## 2021-09-10 DIAGNOSIS — I10 BENIGN ESSENTIAL HTN: ICD-10-CM

## 2021-09-10 PROCEDURE — G0439 PPPS, SUBSEQ VISIT: HCPCS | Performed by: INTERNAL MEDICINE

## 2021-09-10 RX ORDER — AMLODIPINE BESYLATE 5 MG/1
5 TABLET ORAL DAILY
Qty: 90 TABLET | Refills: 1 | Status: SHIPPED | OUTPATIENT
Start: 2021-09-10 | End: 2021-10-27

## 2021-09-10 NOTE — PROGRESS NOTES
HPI:   Lawson Petersen is a 77year old female who presents for a Medicare Subsequent Annual Wellness visit (Pt already had Initial Annual Wellness). Patient is retired, going to travel again. She is volunteering at [employer].  Fully vaccinated for covid 1 Chronic anticoagulation     History of DVT of lower extremity    Wt Readings from Last 3 Encounters:  09/10/21 : 180 lb (81.6 kg)  07/21/21 : 179 lb (81.2 kg)  05/27/21 : 170 lb (77.1 kg)     Last Cholesterol Labs:   Lab Results   Component Value Date onset: 61) in her maternal cousin female; Cancer in her maternal grandfather and mother; Cancer (age of onset: 80) in her father; Diabetes in her sister; Fibromyalgia in her sister;  Heart Disorder in her father, maternal grandmother, and mother; High Blood Cervical, supraclavicular, and axillary nodes normal   Neurologic: Alert and oriented       Vaccination History     Immunization History   Administered Date(s) Administered   • Covid-19 Vaccine Pfizer 30 mcg/0.3 ml 12/20/2020, 01/10/2021   • FLU VAC High D months, have you lost more than 10 pounds without trying?: 2 - No  Has your appetite been poor?: No  How does the patient maintain a good energy level?: Other  How would you describe your daily physical activity?: Moderate  How would you describe your curr -    Colonoscopy due on 07/08/2021    Flexible Sigmoidoscopy   Covered every 4 years -    Fecal Occult Blood Test Covered annually -   Bone Density Screening    Bone density screening    Covered every 2 years after age 72 if diagnosed with risk of osteopor

## 2021-09-10 NOTE — PATIENT INSTRUCTIONS
Samuel Martinez's SCREENING SCHEDULE   Tests on this list are recommended by your physician but may not be covered, or covered at this frequency, by your insurer. Please check with your insurance carrier before scheduling to verify coverage.    LALITO 10/08/2018      No recommendations at this time   Pap and Pelvic    Pap   Covered every 2 years for women at normal risk;  Annually if at high risk -  No recommendations at this time    Chlamydia Annually if high risk -  No recommendations at this time   Sc Directives.

## 2021-09-24 DIAGNOSIS — I26.09 OTHER PULMONARY EMBOLISM WITH ACUTE COR PULMONALE, UNSPECIFIED CHRONICITY (HCC): ICD-10-CM

## 2021-09-27 RX ORDER — APIXABAN 2.5 MG/1
TABLET, FILM COATED ORAL
Qty: 180 TABLET | Refills: 3 | Status: SHIPPED | OUTPATIENT
Start: 2021-09-27 | End: 2022-01-27

## 2021-09-29 ENCOUNTER — TELEPHONE (OUTPATIENT)
Dept: ORTHOPEDICS CLINIC | Facility: CLINIC | Age: 76
End: 2021-09-29

## 2021-09-29 DIAGNOSIS — M25.562 LEFT KNEE PAIN, UNSPECIFIED CHRONICITY: Primary | ICD-10-CM

## 2021-09-29 NOTE — TELEPHONE ENCOUNTER
Pt has not been seen for the left knee. Left knee xrays ordered. Verified voicemail reached with verbal consent signed. Attempted to call Tona Herbert regarding notification that left knee xrays were ordered.  Reached voicemail, left voicemail and prov

## 2021-09-29 NOTE — TELEPHONE ENCOUNTER
Pt has an appt w/Dr Jaja Cronin for Left knee pain. Last imaging 5/20.  Please advice, thanks  Future Appointments   Date Time Provider Nery Cat   10/4/2021  4:20 PM [facility] 2[facility]   10/27/2021  9:30 AM Pooja Henderson MD [facility]

## 2021-10-04 ENCOUNTER — HOSPITAL ENCOUNTER (OUTPATIENT)
Dept: MAMMOGRAPHY | Facility: HOSPITAL | Age: 76
Discharge: HOME | End: 2021-10-04
Attending: INTERNAL MEDICINE
Payer: MEDICARE

## 2021-10-04 DIAGNOSIS — Z12.31 ENCOUNTER FOR SCREENING MAMMOGRAM FOR MALIGNANT NEOPLASM OF BREAST: ICD-10-CM

## 2021-10-04 PROCEDURE — 77063 BREAST TOMOSYNTHESIS BI: CPT | Performed by: INTERNAL MEDICINE

## 2021-10-04 PROCEDURE — 77067 SCR MAMMO BI INCL CAD: CPT | Performed by: INTERNAL MEDICINE

## 2021-10-06 ENCOUNTER — IMMUNIZATION (OUTPATIENT)
Dept: LAB | Facility: HOSPITAL | Age: 76
End: 2021-10-06
Attending: EMERGENCY MEDICINE
Payer: MEDICARE

## 2021-10-06 DIAGNOSIS — Z23 NEED FOR VACCINATION: Primary | ICD-10-CM

## 2021-10-06 PROCEDURE — 0003A SARSCOV2 VAC 30MCG/0.3ML IM: CPT

## 2021-10-18 ENCOUNTER — OFFICE VISIT (OUTPATIENT)
Dept: SURGERY | Facility: CLINIC | Age: 76
End: 2021-10-18

## 2021-10-18 VITALS
WEIGHT: 180 LBS | TEMPERATURE: 97 F | SYSTOLIC BLOOD PRESSURE: 153 MMHG | DIASTOLIC BLOOD PRESSURE: 74 MMHG | HEART RATE: 56 BPM | HEIGHT: 65 IN | BODY MASS INDEX: 29.99 KG/M2

## 2021-10-18 DIAGNOSIS — I10 BENIGN ESSENTIAL HTN: ICD-10-CM

## 2021-10-18 DIAGNOSIS — Z12.11 ENCOUNTER FOR SCREENING COLONOSCOPY: Primary | ICD-10-CM

## 2021-10-18 PROCEDURE — 99204 OFFICE O/P NEW MOD 45 MIN: CPT | Performed by: COLON & RECTAL SURGERY

## 2021-10-18 RX ORDER — POLYETHYLENE GLYCOL 3350, SODIUM CHLORIDE, SODIUM BICARBONATE, POTASSIUM CHLORIDE 420; 11.2; 5.72; 1.48 G/4L; G/4L; G/4L; G/4L
POWDER, FOR SOLUTION ORAL
Qty: 4000 ML | Refills: 0 | Status: SHIPPED | OUTPATIENT
Start: 2021-10-18 | End: 2021-12-06

## 2021-10-18 NOTE — H&P
New Patient V isit Note       Active Problems      No diagnosis found. Chief Complaint   Patient presents with:  Colonoscopy: NP - cscope consult --       History of Present Illness         Allergies  Carlene Pete is allergic to other.     Past Medical / Surgic MG Oral Tab, Take 1 tablet (5 mg total) by mouth daily. , Disp: 90 tablet, Rfl: 1  •  METOPROLOL TARTRATE 50 MG Oral Tab, TAKE ONE-HALF TABLET BY  MOUTH TWICE DAILY, Disp: 90 tablet, Rfl: 0  •  AMLODIPINE BESYLATE 2.5 MG Oral Tab, TAKE 1 TABLET BY MOUTH ONC Chester Speaker, MD

## 2021-10-27 ENCOUNTER — HOSPITAL ENCOUNTER (OUTPATIENT)
Dept: GENERAL RADIOLOGY | Age: 76
Discharge: HOME | End: 2021-10-27
Attending: ORTHOPAEDIC SURGERY
Payer: MEDICARE

## 2021-10-27 ENCOUNTER — OFFICE VISIT (OUTPATIENT)
Dept: ORTHOPEDICS CLINIC | Facility: CLINIC | Age: 76
End: 2021-10-27
Payer: MEDICARE

## 2021-10-27 VITALS — BODY MASS INDEX: 27.45 KG/M2 | HEART RATE: 48 BPM | OXYGEN SATURATION: 100 % | HEIGHT: 66 IN | WEIGHT: 170.81 LBS

## 2021-10-27 DIAGNOSIS — M17.12 PRIMARY OSTEOARTHRITIS OF LEFT KNEE: Primary | ICD-10-CM

## 2021-10-27 DIAGNOSIS — Z01.89 ENCOUNTER FOR LOWER EXTREMITY COMPARISON IMAGING STUDY: ICD-10-CM

## 2021-10-27 DIAGNOSIS — M25.562 LEFT KNEE PAIN, UNSPECIFIED CHRONICITY: ICD-10-CM

## 2021-10-27 PROCEDURE — 73564 X-RAY EXAM KNEE 4 OR MORE: CPT | Performed by: ORTHOPAEDIC SURGERY

## 2021-10-27 PROCEDURE — 73562 X-RAY EXAM OF KNEE 3: CPT | Performed by: ORTHOPAEDIC SURGERY

## 2021-10-27 PROCEDURE — 99213 OFFICE O/P EST LOW 20 MIN: CPT | Performed by: ORTHOPAEDIC SURGERY

## 2021-10-27 NOTE — PROGRESS NOTES
[facility] Progress Note    Subjective: Patient returns clinic now for evaluation of her left knee. States that the right knee and the left hip are doing very well. The left knee acted up approximately 4 to 6 weeks ago.   She states that her electri symptoms persist, she may call for injection only appointment. She expressed understanding and agreement with this discussion and plan.     Trish Louis MD, [address] Orthopedic Surgery  Phone [phone]  Fax [phone]

## 2021-11-03 NOTE — H&P (VIEW-ONLY)
New Patient Visit Note       Active Problems      1. Encounter for screening colonoscopy    2.  Benign essential HTN        Chief Complaint   Patient presents with:  Colonoscopy      History of Present Illness   Marissa Worthington is a 77year old female r LMP  (LMP Unknown)   BMI 29.95 kg/m²   Physical Exam  Constitutional:       General: She is not in acute distress. Appearance: She is well-developed. She is not diaphoretic. HENT:      Head: Normocephalic and atraumatic.       Nose: Nose normal.   Daniel Diaz surgeries or interventions. All questions were answered and the patient voiced understanding and agreed to proceed with colonoscopy. No orders of the defined types were placed in this encounter.         Ilda Homans, MD

## 2021-11-03 NOTE — H&P
New Patient Visit Note       Active Problems      1. Encounter for screening colonoscopy    2.  Benign essential HTN        Chief Complaint   Patient presents with:  Colonoscopy      History of Present Illness   Ember Mendez is a 77year old female r LMP  (LMP Unknown)   BMI 29.95 kg/m²   Physical Exam  Constitutional:       General: She is not in acute distress. Appearance: She is well-developed. She is not diaphoretic. HENT:      Head: Normocephalic and atraumatic.       Nose: Nose normal.   Fang Interiano surgeries or interventions. All questions were answered and the patient voiced understanding and agreed to proceed with colonoscopy. No orders of the defined types were placed in this encounter.         Alonzo Wilkerson MD

## 2021-11-08 ENCOUNTER — LAB ENCOUNTER (OUTPATIENT)
Dept: LAB | Facility: HOSPITAL | Age: 76
End: 2021-11-08
Attending: COLON & RECTAL SURGERY
Payer: MEDICARE

## 2021-11-08 DIAGNOSIS — Z12.11 ENCOUNTER FOR SCREENING COLONOSCOPY: ICD-10-CM

## 2021-11-11 ENCOUNTER — HOSPITAL ENCOUNTER (OUTPATIENT)
Facility: HOSPITAL | Age: 76
Setting detail: HOSPITAL OUTPATIENT SURGERY
Discharge: HOME | End: 2021-11-11
Attending: COLON & RECTAL SURGERY | Admitting: COLON & RECTAL SURGERY
Payer: MEDICARE

## 2021-11-11 ENCOUNTER — ANESTHESIA (OUTPATIENT)
Dept: ENDOSCOPY | Facility: HOSPITAL | Age: 76
End: 2021-11-11
Payer: MEDICARE

## 2021-11-11 ENCOUNTER — ANESTHESIA EVENT (OUTPATIENT)
Dept: ENDOSCOPY | Facility: HOSPITAL | Age: 76
End: 2021-11-11
Payer: MEDICARE

## 2021-11-11 VITALS
TEMPERATURE: 97 F | BODY MASS INDEX: 26.36 KG/M2 | HEIGHT: 66 IN | RESPIRATION RATE: 12 BRPM | WEIGHT: 164 LBS | SYSTOLIC BLOOD PRESSURE: 120 MMHG | HEART RATE: 56 BPM | DIASTOLIC BLOOD PRESSURE: 66 MMHG | OXYGEN SATURATION: 99 %

## 2021-11-11 DIAGNOSIS — Z12.11 ENCOUNTER FOR SCREENING COLONOSCOPY: Primary | ICD-10-CM

## 2021-11-11 PROCEDURE — 0DBM8ZX EXCISION OF DESCENDING COLON, VIA NATURAL OR ARTIFICIAL OPENING ENDOSCOPIC, DIAGNOSTIC: ICD-10-PCS | Performed by: COLON & RECTAL SURGERY

## 2021-11-11 PROCEDURE — 45380 COLONOSCOPY AND BIOPSY: CPT | Performed by: COLON & RECTAL SURGERY

## 2021-11-11 PROCEDURE — 0DBF8ZX EXCISION OF RIGHT LARGE INTESTINE, VIA NATURAL OR ARTIFICIAL OPENING ENDOSCOPIC, DIAGNOSTIC: ICD-10-PCS | Performed by: COLON & RECTAL SURGERY

## 2021-11-11 RX ORDER — SODIUM CHLORIDE, SODIUM LACTATE, POTASSIUM CHLORIDE, CALCIUM CHLORIDE 600; 310; 30; 20 MG/100ML; MG/100ML; MG/100ML; MG/100ML
INJECTION, SOLUTION INTRAVENOUS CONTINUOUS
Status: DISCONTINUED | OUTPATIENT
Start: 2021-11-11 | End: 2021-11-11

## 2021-11-11 RX ORDER — LIDOCAINE HYDROCHLORIDE 10 MG/ML
INJECTION, SOLUTION EPIDURAL; INFILTRATION; INTRACAUDAL; PERINEURAL AS NEEDED
Status: DISCONTINUED | OUTPATIENT
Start: 2021-11-11 | End: 2021-11-11 | Stop reason: SURG

## 2021-11-11 RX ADMIN — LIDOCAINE HYDROCHLORIDE 50 MG: 10 INJECTION, SOLUTION EPIDURAL; INFILTRATION; INTRACAUDAL; PERINEURAL at 10:03:00

## 2021-11-11 RX ADMIN — SODIUM CHLORIDE, SODIUM LACTATE, POTASSIUM CHLORIDE, CALCIUM CHLORIDE: 600; 310; 30; 20 INJECTION, SOLUTION INTRAVENOUS at 10:30:00

## 2021-11-11 NOTE — ANESTHESIA POSTPROCEDURE EVALUATION
[id number] Patient Status:  Hospital Outpatient Surgery   Age/Gender 77year old female MRN [id number]   Location [address] [facility] 28 Attending Maura Elaine MD   Hosp Day # 0 PCP Yolanda Reynolds MD

## 2021-11-11 NOTE — OPERATIVE REPORT
[facility]  Operative Note    Marino Santana Location: OR   CSN [id number] MRN [id number]    1945 Age 77year old   Admission Date 2021 Operation Date 2021   Attending Physician Nya Hidalgo MD Operating Physician  decubitus position. Monitored anesthesia care was administered. A time-out was performed. The perineum and perianal skin were examined. A digital rectal examination was performed.  A lubricated colonoscope was then inserted and carefully navigated to the

## 2021-11-11 NOTE — INTERVAL H&P NOTE
Pre-op Diagnosis: Encounter for screening colonoscopy [Z12.11]    The above referenced H&P was reviewed by Cory Samaniego MD on 11/11/2021, the patient was examined and no significant changes have occurred in the patient's condition since the H&P wa

## 2021-11-11 NOTE — ANESTHESIA PREPROCEDURE EVALUATION
PRE-OP EVALUATION    Patient Name: Claudene Prost    Admit Diagnosis: Encounter for screening colonoscopy [Z12.11]    Pre-op Diagnosis: Encounter for screening colonoscopy [Z12.11]    COLONOSCOPY    Anesthesia Procedure: COLONOSCOPY (N/A )    Surgeon( for 10 days, may repeat course as directed), Disp: 90 mL, Rfl: 1, Unknown at Unknown time        Allergies: Other      Anesthesia Evaluation        Anesthetic Complications           GI/Hepatic/Renal    Negative GI/hepatic/renal ROS.

## 2021-11-15 RX ORDER — METOPROLOL TARTRATE 50 MG/1
TABLET, FILM COATED ORAL
Qty: 90 TABLET | Refills: 3 | Status: SHIPPED | OUTPATIENT
Start: 2021-11-15

## 2021-11-15 NOTE — TELEPHONE ENCOUNTER
Last VISIT - 9/10/21    Last CPE - 9/10/21    Last REFILL -     METOPROLOL TARTRATE 50 MG Oral Tab 90 tablet 0 8/27/2021     Last LABS - 9/3/21 TSH, LIPID      Per PROTOCOL?  PASSED

## 2021-12-06 ENCOUNTER — OFFICE VISIT (OUTPATIENT)
Dept: ORTHOPEDICS CLINIC | Facility: CLINIC | Age: 76
End: 2021-12-06
Payer: MEDICARE

## 2021-12-06 VITALS — DIASTOLIC BLOOD PRESSURE: 74 MMHG | HEART RATE: 47 BPM | OXYGEN SATURATION: 99 % | SYSTOLIC BLOOD PRESSURE: 138 MMHG

## 2021-12-06 DIAGNOSIS — M17.12 PRIMARY OSTEOARTHRITIS OF LEFT KNEE: Primary | ICD-10-CM

## 2021-12-06 PROCEDURE — 20610 DRAIN/INJ JOINT/BURSA W/O US: CPT | Performed by: ORTHOPAEDIC SURGERY

## 2021-12-06 RX ORDER — TRIAMCINOLONE ACETONIDE 40 MG/ML
40 INJECTION, SUSPENSION INTRA-ARTICULAR; INTRAMUSCULAR ONCE
Status: COMPLETED | OUTPATIENT
Start: 2021-12-06 | End: 2021-12-06

## 2021-12-06 RX ADMIN — TRIAMCINOLONE ACETONIDE 40 MG: 40 INJECTION, SUSPENSION INTRA-ARTICULAR; INTRAMUSCULAR at 11:15:00

## 2021-12-06 NOTE — PROCEDURES
Patient has been having an increase or persistence in symptoms of the left knee. Symptoms are more about the medial aspect of the knee joint. She did want to proceed with knee injection today.     After informed consent, the patient's left knee was marked

## 2021-12-10 ENCOUNTER — LAB ENCOUNTER (OUTPATIENT)
Dept: LAB | Facility: HOSPITAL | Age: 76
End: 2021-12-10
Attending: INTERNAL MEDICINE
Payer: MEDICARE

## 2021-12-10 DIAGNOSIS — E78.49 OTHER HYPERLIPIDEMIA: ICD-10-CM

## 2021-12-10 PROCEDURE — 36415 COLL VENOUS BLD VENIPUNCTURE: CPT

## 2021-12-10 PROCEDURE — 80061 LIPID PANEL: CPT

## 2022-01-08 ENCOUNTER — TELEPHONE (OUTPATIENT)
Dept: INTERNAL MEDICINE CLINIC | Facility: HOSPITAL | Age: 77
End: 2022-01-08

## 2022-01-15 DIAGNOSIS — I10 BENIGN ESSENTIAL HTN: ICD-10-CM

## 2022-01-17 RX ORDER — AMLODIPINE BESYLATE 5 MG/1
TABLET ORAL
Qty: 90 TABLET | Refills: 3 | Status: SHIPPED | OUTPATIENT
Start: 2022-01-17

## 2022-01-17 NOTE — TELEPHONE ENCOUNTER
Last VISIT - 9/10/21 Wellness visit    Last CPE - 9/30/20    Last REFILL -     AMLODIPINE BESYLATE 2.5 MG Oral Tab 90 tablet 1 8/23/2021     Last LABS - 12/10/221 lipid 9/3/21 tsh, cmp, cbc      Per PROTOCOL?  PASSED

## 2022-01-19 RX ORDER — MONTELUKAST SODIUM 10 MG/1
TABLET ORAL
Qty: 90 TABLET | Refills: 3 | Status: SHIPPED | OUTPATIENT
Start: 2022-01-19

## 2022-01-19 NOTE — TELEPHONE ENCOUNTER
Last VISIT - 9/10/21 Wellness visit    Last CPE - 9/10/21    Last REFILL -   MONTELUKAST SODIUM 10 MG Oral Tab 90 tablet 1 8/17/2021      Last LABS - 12/10/21 lipid 9/3/21 TSH      Per PROTOCOL? FAILED    Please Approve or Deny.

## 2022-01-26 ENCOUNTER — OFFICE VISIT (OUTPATIENT)
Dept: HEMATOLOGY/ONCOLOGY | Facility: HOSPITAL | Age: 77
End: 2022-01-26
Attending: INTERNAL MEDICINE
Payer: MEDICARE

## 2022-01-26 VITALS
HEART RATE: 55 BPM | HEIGHT: 65.98 IN | RESPIRATION RATE: 16 BRPM | TEMPERATURE: 98 F | DIASTOLIC BLOOD PRESSURE: 79 MMHG | BODY MASS INDEX: 26.2 KG/M2 | WEIGHT: 163 LBS | OXYGEN SATURATION: 98 % | SYSTOLIC BLOOD PRESSURE: 129 MMHG

## 2022-01-26 DIAGNOSIS — I26.09 OTHER PULMONARY EMBOLISM WITH ACUTE COR PULMONALE, UNSPECIFIED CHRONICITY (HCC): Primary | ICD-10-CM

## 2022-01-26 DIAGNOSIS — Z71.89 ENCOUNTER FOR ANTICOAGULATION DISCUSSION AND COUNSELING: ICD-10-CM

## 2022-01-26 DIAGNOSIS — Z79.01 CHRONIC ANTICOAGULATION: ICD-10-CM

## 2022-01-26 DIAGNOSIS — Z86.718 HISTORY OF DVT OF LOWER EXTREMITY: ICD-10-CM

## 2022-01-26 DIAGNOSIS — D68.52 PROTHROMBIN GENE MUTATION (HCC): ICD-10-CM

## 2022-01-26 LAB
ALBUMIN SERPL-MCNC: 3.5 G/DL (ref 3.4–5)
ALBUMIN/GLOB SERPL: 0.9 {RATIO} (ref 1–2)
ALP LIVER SERPL-CCNC: 57 U/L
ALT SERPL-CCNC: 25 U/L
ANION GAP SERPL CALC-SCNC: 6 MMOL/L (ref 0–18)
AST SERPL-CCNC: 12 U/L (ref 15–37)
BASOPHILS # BLD AUTO: 0.06 X10(3) UL (ref 0–0.2)
BASOPHILS NFR BLD AUTO: 0.9 %
BILIRUB SERPL-MCNC: 0.5 MG/DL (ref 0.1–2)
BUN BLD-MCNC: 11 MG/DL (ref 7–18)
CALCIUM BLD-MCNC: 9.3 MG/DL (ref 8.5–10.1)
CHLORIDE SERPL-SCNC: 106 MMOL/L (ref 98–112)
CO2 SERPL-SCNC: 24 MMOL/L (ref 21–32)
CREAT BLD-MCNC: 0.83 MG/DL
EOSINOPHIL # BLD AUTO: 0.1 X10(3) UL (ref 0–0.7)
EOSINOPHIL NFR BLD AUTO: 1.5 %
ERYTHROCYTE [DISTWIDTH] IN BLOOD BY AUTOMATED COUNT: 12.7 %
FASTING STATUS PATIENT QL REPORTED: NO
GLOBULIN PLAS-MCNC: 3.7 G/DL (ref 2.8–4.4)
GLUCOSE BLD-MCNC: 102 MG/DL (ref 70–99)
HCT VFR BLD AUTO: 39.1 %
HGB BLD-MCNC: 12.8 G/DL
IMM GRANULOCYTES # BLD AUTO: 0.02 X10(3) UL (ref 0–1)
IMM GRANULOCYTES NFR BLD: 0.3 %
LYMPHOCYTES # BLD AUTO: 1.94 X10(3) UL (ref 1–4)
LYMPHOCYTES NFR BLD AUTO: 28.6 %
MCH RBC QN AUTO: 31.1 PG (ref 26–34)
MCHC RBC AUTO-ENTMCNC: 32.7 G/DL (ref 31–37)
MCV RBC AUTO: 94.9 FL
MONOCYTES # BLD AUTO: 0.72 X10(3) UL (ref 0.1–1)
MONOCYTES NFR BLD AUTO: 10.6 %
NEUTROPHILS # BLD AUTO: 3.95 X10 (3) UL (ref 1.5–7.7)
NEUTROPHILS # BLD AUTO: 3.95 X10(3) UL (ref 1.5–7.7)
NEUTROPHILS NFR BLD AUTO: 58.1 %
OSMOLALITY SERPL CALC.SUM OF ELEC: 282 MOSM/KG (ref 275–295)
PLATELET # BLD AUTO: 247 10(3)UL (ref 150–450)
POTASSIUM SERPL-SCNC: 4 MMOL/L (ref 3.5–5.1)
PROT SERPL-MCNC: 7.2 G/DL (ref 6.4–8.2)
RBC # BLD AUTO: 4.12 X10(6)UL
SODIUM SERPL-SCNC: 136 MMOL/L (ref 136–145)
WBC # BLD AUTO: 6.8 X10(3) UL (ref 4–11)

## 2022-01-26 PROCEDURE — 99214 OFFICE O/P EST MOD 30 MIN: CPT | Performed by: INTERNAL MEDICINE

## 2022-01-26 NOTE — PROGRESS NOTES
Hematology Clinic Follow Up Visit    Patient Name: Evy Roberts  Medical Record Number: [id number]   YOB: 1945    PCP: Dr. Gee La  Other providers:  Dr. Florinda Fuentes (cardiology/vascular)    Reason for Consultation:  Lucien Irene 1 TABLET BY MOUTH  DAILY, Disp: 90 tablet, Rfl: 3  METOPROLOL TARTRATE 50 MG Oral Tab, TAKE ONE-HALF TABLET BY  MOUTH TWICE DAILY, Disp: 90 tablet, Rfl: 3  Biotin w/ Vitamins C & E (HAIR/SKIN/NAILS OR), Take by mouth daily. , Disp: , Rfl:   ELIQUIS 2.5 MG O 10-point ROS was done with pertinent positives and negative per the HPI    Vital Signs:  Height: 167.6 cm (5' 5.98\") (01/26 0955)  Weight: 73.9 kg (163 lb) (01/26 0955)  BSA (Calculated - sq m): 1.83 sq meters (01/26 0955)  Pulse: 55 (01/26 0955)  BP: 129 136 01/26/2022    K 4.0 01/26/2022     01/26/2022    CO2 24.0 01/26/2022     Lab Results   Component Value Date    .4 (H) 03/15/2016    INR 1.35 (H) 03/13/2016     Component      Latest Ref Rng 3/14/2016   STACLOT LUPUS ANTICOAGULANT      Nega chronic anticoagulation  -tolerating eliquis well without any issues, will continue eliquis 2.5mg BID at this time.     -monitor CBC and CMP q6 months while on anticoagulation    RTC in 6 months    Johanna Garcia MD  Hematology/Medical Oncology  [facility]

## 2022-01-26 NOTE — PROGRESS NOTES
Education Record     Learner:  Patient     Disease / Diagnosis: DVT/PE     Barriers / Hollis Lincoln                IDALBDTW:     Method:  Brief focused                QGPZRNIJ:     General [facility] of care reviewed                OSYNNSUM:     Ou

## 2022-04-05 ENCOUNTER — IMMUNIZATION (OUTPATIENT)
Dept: LAB | Age: 77
End: 2022-04-05
Attending: EMERGENCY MEDICINE
Payer: MEDICARE

## 2022-04-05 DIAGNOSIS — Z23 NEED FOR VACCINATION: Primary | ICD-10-CM

## 2022-04-05 PROCEDURE — 0054A SARSCOV2 VAC 30MCG TRS SUCR: CPT

## 2022-05-11 ENCOUNTER — OFFICE VISIT (OUTPATIENT)
Dept: ORTHOPEDICS CLINIC | Facility: CLINIC | Age: 77
End: 2022-05-11
Payer: MEDICARE

## 2022-05-11 VITALS
HEIGHT: 66 IN | DIASTOLIC BLOOD PRESSURE: 70 MMHG | OXYGEN SATURATION: 98 % | HEART RATE: 52 BPM | BODY MASS INDEX: 26.03 KG/M2 | SYSTOLIC BLOOD PRESSURE: 132 MMHG | WEIGHT: 162 LBS

## 2022-05-11 DIAGNOSIS — M16.12 PRIMARY OSTEOARTHRITIS OF LEFT HIP: Primary | ICD-10-CM

## 2022-05-11 PROCEDURE — 20611 DRAIN/INJ JOINT/BURSA W/US: CPT | Performed by: ORTHOPAEDIC SURGERY

## 2022-05-11 RX ORDER — TRIAMCINOLONE ACETONIDE 40 MG/ML
40 INJECTION, SUSPENSION INTRA-ARTICULAR; INTRAMUSCULAR ONCE
Status: COMPLETED | OUTPATIENT
Start: 2022-05-11 | End: 2022-05-11

## 2022-05-11 RX ADMIN — TRIAMCINOLONE ACETONIDE 40 MG: 40 INJECTION, SUSPENSION INTRA-ARTICULAR; INTRAMUSCULAR at 10:57:00

## 2022-05-11 NOTE — PROCEDURES
After informed consent, the patient's left hip was marked, prepped with topical antiseptic, and locally anesthetized with skin refrigerant followed by injection of 1% lidocaine to create a skin wheal anteriorly at an insertion site a few fingerbreadths lateral to the femoral pulse, along the trajectory of the femoral neck. Next, the area was re-prepped with topical antiseptic, and ultrasound guidance was performed to direct a 20 gauge spinal needle into the hip joint at the junction of the femoral head and neck, after which a mixture of 1mL 40mg/mL Kenalog, 2mL 1% lidocaine and 2mL 0.5% marcaine was injected while visualizing the fluid under ultrasound. Confirmatory imaging was saved to the patient's chart. A band-aid was applied. The patient tolerated the procedure well.     Jeanette Escoto MD, [address] [place] Orthopedic Surgery  Phone [phone]  Fax [phone]

## 2022-06-30 ENCOUNTER — PATIENT MESSAGE (OUTPATIENT)
Dept: INTERNAL MEDICINE CLINIC | Facility: CLINIC | Age: 77
End: 2022-06-30

## 2022-06-30 RX ORDER — NEOMYCIN SULFATE, POLYMYXIN B SULFATE, HYDROCORTISONE 3.5; 10000; 1 MG/ML; [USP'U]/ML; MG/ML
SOLUTION/ DROPS AURICULAR (OTIC)
Qty: 90 ML | Refills: 1 | OUTPATIENT
Start: 2022-06-30

## 2022-07-11 NOTE — TELEPHONE ENCOUNTER
Cami Ramirez, RN 6/30/2022 5:11 PM CDT      ----- Message -----  From: Jonathan Molina  Sent: 6/30/2022 5:03 PM CDT  To: [facility] Clinical Staff  Subject: Refill of ear drop     Dr. Lv Razo Edge:    I'm about to open the last container of the ear drop that I use called:   NEOMY/POLYMYX-B/HC Otic Sol    Could you please confirm it for refill? Thank you. Carlota Flores Necessary

## 2022-07-13 ENCOUNTER — TELEPHONE (OUTPATIENT)
Dept: INTERNAL MEDICINE CLINIC | Facility: CLINIC | Age: 77
End: 2022-07-13

## 2022-07-13 RX ORDER — NEOMYCIN SULFATE, POLYMYXIN B SULFATE, BACITRACIN ZINC, HYDROCORTISONE 3.5; 10000; 400; 1 MG/G; [USP'U]/G; [USP'U]/G; MG/G
OINTMENT OPHTHALMIC 2 TIMES DAILY
Refills: 0
Start: 2022-07-13

## 2022-07-13 RX ORDER — NEOMYCIN SULFATE, POLYMYXIN B SULFATE, HYDROCORTISONE 3.5; 10000; 1 MG/ML; [USP'U]/ML; MG/ML
SOLUTION/ DROPS AURICULAR (OTIC)
Qty: 90 ML | Refills: 1 | Status: SHIPPED | OUTPATIENT
Start: 2022-07-13

## 2022-07-27 ENCOUNTER — OFFICE VISIT (OUTPATIENT)
Dept: HEMATOLOGY/ONCOLOGY | Facility: HOSPITAL | Age: 77
End: 2022-07-27
Attending: INTERNAL MEDICINE
Payer: MEDICARE

## 2022-07-27 VITALS
BODY MASS INDEX: 26 KG/M2 | SYSTOLIC BLOOD PRESSURE: 157 MMHG | OXYGEN SATURATION: 99 % | TEMPERATURE: 98 F | HEART RATE: 54 BPM | WEIGHT: 159.19 LBS | RESPIRATION RATE: 16 BRPM | DIASTOLIC BLOOD PRESSURE: 77 MMHG

## 2022-07-27 DIAGNOSIS — Z79.01 CHRONIC ANTICOAGULATION: Primary | ICD-10-CM

## 2022-07-27 DIAGNOSIS — I26.09 OTHER PULMONARY EMBOLISM WITH ACUTE COR PULMONALE, UNSPECIFIED CHRONICITY (HCC): ICD-10-CM

## 2022-07-27 DIAGNOSIS — D68.52 PROTHROMBIN GENE MUTATION (HCC): ICD-10-CM

## 2022-07-27 DIAGNOSIS — Z86.718 HISTORY OF DVT OF LOWER EXTREMITY: ICD-10-CM

## 2022-07-27 DIAGNOSIS — Z71.89 ENCOUNTER FOR ANTICOAGULATION DISCUSSION AND COUNSELING: ICD-10-CM

## 2022-07-27 LAB
ALBUMIN SERPL-MCNC: 3.7 G/DL (ref 3.4–5)
ALBUMIN/GLOB SERPL: 0.9 {RATIO} (ref 1–2)
ALP LIVER SERPL-CCNC: 66 U/L
ALT SERPL-CCNC: 21 U/L
ANION GAP SERPL CALC-SCNC: 2 MMOL/L (ref 0–18)
AST SERPL-CCNC: 16 U/L (ref 15–37)
BASOPHILS # BLD AUTO: 0.08 X10(3) UL (ref 0–0.2)
BASOPHILS NFR BLD AUTO: 1.1 %
BILIRUB SERPL-MCNC: 0.5 MG/DL (ref 0.1–2)
BUN BLD-MCNC: 9 MG/DL (ref 7–18)
CALCIUM BLD-MCNC: 9.3 MG/DL (ref 8.5–10.1)
CHLORIDE SERPL-SCNC: 103 MMOL/L (ref 98–112)
CO2 SERPL-SCNC: 28 MMOL/L (ref 21–32)
CREAT BLD-MCNC: 0.75 MG/DL
EOSINOPHIL # BLD AUTO: 0.11 X10(3) UL (ref 0–0.7)
EOSINOPHIL NFR BLD AUTO: 1.5 %
ERYTHROCYTE [DISTWIDTH] IN BLOOD BY AUTOMATED COUNT: 12.3 %
FASTING STATUS PATIENT QL REPORTED: NO
GLOBULIN PLAS-MCNC: 4 G/DL (ref 2.8–4.4)
GLUCOSE BLD-MCNC: 82 MG/DL (ref 70–99)
HCT VFR BLD AUTO: 40 %
HGB BLD-MCNC: 13.1 G/DL
IMM GRANULOCYTES # BLD AUTO: 0.03 X10(3) UL (ref 0–1)
IMM GRANULOCYTES NFR BLD: 0.4 %
LYMPHOCYTES # BLD AUTO: 2.32 X10(3) UL (ref 1–4)
LYMPHOCYTES NFR BLD AUTO: 31.7 %
MCH RBC QN AUTO: 31.2 PG (ref 26–34)
MCHC RBC AUTO-ENTMCNC: 32.8 G/DL (ref 31–37)
MCV RBC AUTO: 95.2 FL
MONOCYTES # BLD AUTO: 0.86 X10(3) UL (ref 0.1–1)
MONOCYTES NFR BLD AUTO: 11.7 %
NEUTROPHILS # BLD AUTO: 3.93 X10 (3) UL (ref 1.5–7.7)
NEUTROPHILS # BLD AUTO: 3.93 X10(3) UL (ref 1.5–7.7)
NEUTROPHILS NFR BLD AUTO: 53.6 %
OSMOLALITY SERPL CALC.SUM OF ELEC: 274 MOSM/KG (ref 275–295)
PLATELET # BLD AUTO: 272 10(3)UL (ref 150–450)
POTASSIUM SERPL-SCNC: 4.1 MMOL/L (ref 3.5–5.1)
PROT SERPL-MCNC: 7.7 G/DL (ref 6.4–8.2)
RBC # BLD AUTO: 4.2 X10(6)UL
SODIUM SERPL-SCNC: 133 MMOL/L (ref 136–145)
WBC # BLD AUTO: 7.3 X10(3) UL (ref 4–11)

## 2022-07-27 PROCEDURE — 99214 OFFICE O/P EST MOD 30 MIN: CPT | Performed by: INTERNAL MEDICINE

## 2022-08-02 NOTE — TELEPHONE ENCOUNTER
Notes recorded by Demetria Hassan MD on 7/2/2018 at 4:43 PM CDT  [facility] CLYDE 2D+3D SCREENING BILAT:   Benign  Repeat in 1 year    Mammogram pended for your approval.  Please review and advise. Thank you. toes      For your safety, please do not wear any jewelry or body piercing's on the day of surgery. All jewelry must be removed. If you have dentures, they will be removed before going to operating room. For your convenience, we will provide you with a container. If you wear contact lenses or glasses, they will be removed, please bring a case for them. If you have a living will and a durable power of  for healthcare, please bring in a copy. As part of our patient safety program to minimize surgical site infections, we ask you to do the following:    Please notify your surgeon if you develop any illness between         now and the  day of your surgery. This includes a cough, cold, fever, sore throat, nausea,         or vomiting, and diarrhea, etc.   Please notify your surgeon if you experience dizziness, shortness         of breath or blurred vision between now and the time of your surgery. Do not shave your operative site 96 hours prior to surgery. For face and neck surgery, men may use an electric razor 48 hours   prior to surgery. You may shower the night before surgery or the morning of   your surgery with an antibacterial soap. You will need to bring a photo ID and insurance card    [facility] has an onsite pharmacy, would you like to utilize our pharmacy     If you will be staying overnight and use a C-pap machine, please bring   your C-pap to hospital     Our goal is to provide you with excellent care, therefore, visitors will be limited to two(2) in the room at a time so that we may focus on providing this care for you. Please contact pre-admission testing if you have any further questions. [facility] phone number:  [phone] PAT fax number:  [phone]  Please note these are generalized instructions for all surgical cases, you may be provided with more specific instructions according to your surgery.     C-Difficile admission screening and protocol:       * Admitted with diarrhea? [] YES    [x]  NO     *Prior history of C-Diff. In last 3 months? [] YES    [x]  NO     *Antibiotic use in the past 6-8 weeks? []  NO    [x]  YES                 If yes, which ANTIBIOTIC AND Reason infection ___     *Prior hospitalization or nursing home in the last month? []  YES    [x]  NO        SAFETY FIRST. .call before you fall

## 2022-08-17 RX ORDER — NEOMYCIN SULFATE, POLYMYXIN B SULFATE, HYDROCORTISONE 3.5; 10000; 1 MG/ML; [USP'U]/ML; MG/ML
SOLUTION/ DROPS AURICULAR (OTIC)
Qty: 90 ML | Refills: 0 | Status: SHIPPED | OUTPATIENT
Start: 2022-08-17

## 2022-08-17 NOTE — TELEPHONE ENCOUNTER
Last VISIT - 9/10/21 Wellness visit    Last CPE - 9/10/21    Last REFILL -     Neomycin-Polymyxin-HC 1 % Otic Solution 90 mL 1 7/13/2022     Last LABS - 7/27/22 cmp, cbc 12/10/21 lipid    Per PROTOCOL? NONE    Please Approve or Deny.

## 2022-08-19 ENCOUNTER — TELEPHONE (OUTPATIENT)
Dept: INTERNAL MEDICINE CLINIC | Facility: CLINIC | Age: 77
End: 2022-08-19

## 2022-08-19 DIAGNOSIS — Z00.00 ENCOUNTER FOR ANNUAL HEALTH EXAMINATION: Primary | ICD-10-CM

## 2022-08-19 DIAGNOSIS — I10 BENIGN ESSENTIAL HTN: ICD-10-CM

## 2022-08-19 NOTE — TELEPHONE ENCOUNTER
Future Appointments   Date Time Provider Nery Cat   10/18/2022  4:20 PM Elia Pineda MD [facility]     Orders to edward- Pt informed that labs need to be completed no sooner than 2 weeks prior to the appt.  Pt aware to fast-no call back required

## 2022-09-08 ENCOUNTER — TELEPHONE (OUTPATIENT)
Dept: INTERNAL MEDICINE CLINIC | Facility: CLINIC | Age: 77
End: 2022-09-08

## 2022-09-08 DIAGNOSIS — Z12.31 ENCOUNTER FOR SCREENING MAMMOGRAM FOR MALIGNANT NEOPLASM OF BREAST: Primary | ICD-10-CM

## 2022-09-21 NOTE — TELEPHONE ENCOUNTER
Cbc and cmp labs active 7/27/22. Placed lipid and tsh per [facility] protocol.

## 2022-10-03 RX ORDER — METOPROLOL TARTRATE 50 MG/1
TABLET, FILM COATED ORAL
Qty: 90 TABLET | Refills: 3 | OUTPATIENT
Start: 2022-10-03

## 2022-10-13 ENCOUNTER — LAB ENCOUNTER (OUTPATIENT)
Dept: LAB | Facility: HOSPITAL | Age: 77
End: 2022-10-13
Attending: INTERNAL MEDICINE
Payer: MEDICARE

## 2022-10-13 DIAGNOSIS — I10 BENIGN ESSENTIAL HTN: ICD-10-CM

## 2022-10-13 DIAGNOSIS — Z00.00 ENCOUNTER FOR ANNUAL HEALTH EXAMINATION: ICD-10-CM

## 2022-10-13 LAB
CHOLEST SERPL-MCNC: 220 MG/DL (ref ?–200)
FASTING PATIENT LIPID ANSWER: YES
HDLC SERPL-MCNC: 69 MG/DL (ref 40–59)
LDLC SERPL CALC-MCNC: 135 MG/DL (ref ?–100)
NONHDLC SERPL-MCNC: 151 MG/DL (ref ?–130)
TRIGL SERPL-MCNC: 89 MG/DL (ref 30–149)
TSI SER-ACNC: 1.48 MIU/ML (ref 0.36–3.74)
VLDLC SERPL CALC-MCNC: 16 MG/DL (ref 0–30)

## 2022-10-13 PROCEDURE — 80061 LIPID PANEL: CPT

## 2022-10-13 PROCEDURE — 36415 COLL VENOUS BLD VENIPUNCTURE: CPT

## 2022-10-13 PROCEDURE — 84443 ASSAY THYROID STIM HORMONE: CPT

## 2022-10-14 ENCOUNTER — HOSPITAL ENCOUNTER (OUTPATIENT)
Dept: MAMMOGRAPHY | Facility: HOSPITAL | Age: 77
Discharge: HOME | End: 2022-10-14
Attending: INTERNAL MEDICINE
Payer: MEDICARE

## 2022-10-14 DIAGNOSIS — Z12.31 ENCOUNTER FOR SCREENING MAMMOGRAM FOR MALIGNANT NEOPLASM OF BREAST: ICD-10-CM

## 2022-10-14 PROCEDURE — 77063 BREAST TOMOSYNTHESIS BI: CPT | Performed by: INTERNAL MEDICINE

## 2022-10-14 PROCEDURE — 77067 SCR MAMMO BI INCL CAD: CPT | Performed by: INTERNAL MEDICINE

## 2022-10-18 ENCOUNTER — OFFICE VISIT (OUTPATIENT)
Dept: INTERNAL MEDICINE CLINIC | Facility: CLINIC | Age: 77
End: 2022-10-18
Payer: MEDICARE

## 2022-10-18 VITALS
RESPIRATION RATE: 16 BRPM | OXYGEN SATURATION: 98 % | DIASTOLIC BLOOD PRESSURE: 76 MMHG | BODY MASS INDEX: 26.41 KG/M2 | HEIGHT: 65.75 IN | TEMPERATURE: 98 F | SYSTOLIC BLOOD PRESSURE: 138 MMHG | HEART RATE: 60 BPM | WEIGHT: 162.38 LBS

## 2022-10-18 DIAGNOSIS — Z86.711 HISTORY OF PULMONARY EMBOLUS (PE): ICD-10-CM

## 2022-10-18 DIAGNOSIS — Z00.00 ENCOUNTER FOR ANNUAL HEALTH EXAMINATION: Primary | ICD-10-CM

## 2022-10-18 DIAGNOSIS — Z78.0 POST-MENOPAUSAL: ICD-10-CM

## 2022-10-18 DIAGNOSIS — I10 BENIGN ESSENTIAL HTN: ICD-10-CM

## 2022-10-18 DIAGNOSIS — D68.52 PROTHROMBIN GENE MUTATION (HCC): ICD-10-CM

## 2022-10-18 DIAGNOSIS — R20.0 NUMBNESS OF FEET: ICD-10-CM

## 2022-10-18 PROCEDURE — G0439 PPPS, SUBSEQ VISIT: HCPCS | Performed by: INTERNAL MEDICINE

## 2022-10-18 PROCEDURE — 1126F AMNT PAIN NOTED NONE PRSNT: CPT | Performed by: INTERNAL MEDICINE

## 2022-10-18 RX ORDER — MONTELUKAST SODIUM 10 MG/1
10 TABLET ORAL DAILY PRN
Qty: 90 TABLET | Refills: 3 | Status: CANCELLED | OUTPATIENT
Start: 2022-10-18

## 2022-10-19 PROBLEM — Z86.711 HISTORY OF PULMONARY EMBOLUS (PE): Status: ACTIVE | Noted: 2022-10-19

## 2022-10-19 PROBLEM — Z86.718 HISTORY OF DVT OF LOWER EXTREMITY: Status: RESOLVED | Noted: 2019-03-28 | Resolved: 2022-10-19

## 2022-10-19 PROBLEM — R20.0 NUMBNESS OF FEET: Status: ACTIVE | Noted: 2022-10-19

## 2022-10-19 PROBLEM — Z12.11 ENCOUNTER FOR SCREENING COLONOSCOPY: Status: RESOLVED | Noted: 2018-03-02 | Resolved: 2022-10-19

## 2022-11-01 ENCOUNTER — HOSPITAL ENCOUNTER (OUTPATIENT)
Dept: BONE DENSITY | Age: 77
Discharge: HOME | End: 2022-11-01
Attending: INTERNAL MEDICINE
Payer: MEDICARE

## 2022-11-01 DIAGNOSIS — Z78.0 POST-MENOPAUSAL: ICD-10-CM

## 2022-11-01 PROCEDURE — 77080 DXA BONE DENSITY AXIAL: CPT | Performed by: INTERNAL MEDICINE

## 2022-12-12 DIAGNOSIS — I10 BENIGN ESSENTIAL HTN: ICD-10-CM

## 2022-12-14 RX ORDER — MONTELUKAST SODIUM 10 MG/1
TABLET ORAL
Qty: 90 TABLET | Refills: 3 | Status: SHIPPED | OUTPATIENT
Start: 2022-12-14

## 2022-12-14 RX ORDER — AMLODIPINE BESYLATE 5 MG/1
TABLET ORAL
Qty: 90 TABLET | Refills: 3 | Status: SHIPPED | OUTPATIENT
Start: 2022-12-14

## 2022-12-14 NOTE — TELEPHONE ENCOUNTER
Last VISIT - 10/18/22 Wellness visit    Last CPE - 10/18/22    Last REFILL -     MONTELUKAST 10 MG Oral Tab 90 tablet 3 1/19/2022     Last LABS - 10/13/22 tsh, lipid active 7/27/22 cmp, cbc    Per PROTOCOL? FAILED    Please Approve or Deny.

## 2022-12-17 ENCOUNTER — APPOINTMENT (OUTPATIENT)
Dept: GENERAL RADIOLOGY | Facility: HOSPITAL | Age: 77
End: 2022-12-17
Attending: EMERGENCY MEDICINE
Payer: MEDICARE

## 2022-12-17 ENCOUNTER — HOSPITAL ENCOUNTER (EMERGENCY)
Facility: HOSPITAL | Age: 77
Discharge: HOME | End: 2022-12-17
Attending: EMERGENCY MEDICINE
Payer: MEDICARE

## 2022-12-17 VITALS
SYSTOLIC BLOOD PRESSURE: 153 MMHG | HEART RATE: 81 BPM | RESPIRATION RATE: 17 BRPM | OXYGEN SATURATION: 98 % | DIASTOLIC BLOOD PRESSURE: 98 MMHG | TEMPERATURE: 97 F

## 2022-12-17 DIAGNOSIS — M25.00 HEMARTHROSIS: Primary | ICD-10-CM

## 2022-12-17 LAB
BASOPHILS NFR SNV: 0 %
CRYSTALS SNV QL MICRO: NEGATIVE
EOSINOPHIL NFR SNV: 1 %
GRANULOCYTES # SNV AUTO: 3910 /MM3 (ref 0–200)
LYMPHOCYTES NFR SNV: 27 %
MONOS+MACROS NFR SNV: 3 %
NEUTROPHILS NFR SNV: 69 %
RBC # FLD AUTO: ABNORMAL /MM3 (ref ?–1)
TOTAL CELLS COUNTED FLD: 100

## 2022-12-17 PROCEDURE — 99285 EMERGENCY DEPT VISIT HI MDM: CPT

## 2022-12-17 PROCEDURE — 20610 DRAIN/INJ JOINT/BURSA W/O US: CPT

## 2022-12-17 PROCEDURE — 89050 BODY FLUID CELL COUNT: CPT | Performed by: EMERGENCY MEDICINE

## 2022-12-17 PROCEDURE — 87205 SMEAR GRAM STAIN: CPT | Performed by: EMERGENCY MEDICINE

## 2022-12-17 PROCEDURE — 89060 EXAM SYNOVIAL FLUID CRYSTALS: CPT | Performed by: EMERGENCY MEDICINE

## 2022-12-17 PROCEDURE — 87070 CULTURE OTHR SPECIMN AEROBIC: CPT | Performed by: EMERGENCY MEDICINE

## 2022-12-17 PROCEDURE — 73560 X-RAY EXAM OF KNEE 1 OR 2: CPT | Performed by: EMERGENCY MEDICINE

## 2022-12-17 PROCEDURE — 99284 EMERGENCY DEPT VISIT MOD MDM: CPT

## 2022-12-17 RX ORDER — TRAMADOL HYDROCHLORIDE 50 MG/1
100 TABLET ORAL ONCE
Status: COMPLETED | OUTPATIENT
Start: 2022-12-17 | End: 2022-12-17

## 2022-12-17 RX ORDER — TRAMADOL HYDROCHLORIDE 50 MG/1
50 TABLET ORAL EVERY 6 HOURS PRN
Qty: 10 TABLET | Refills: 0 | Status: SHIPPED | OUTPATIENT
Start: 2022-12-17 | End: 2022-12-22

## 2022-12-17 NOTE — ED INITIAL ASSESSMENT (HPI)
Pt via ems for left knee pain and swelling starting at home rating pain 10/10. 35mcg fentanyl given per ems. Denies trauma.

## 2022-12-19 ENCOUNTER — OFFICE VISIT (OUTPATIENT)
Dept: ORTHOPEDICS CLINIC | Facility: CLINIC | Age: 77
End: 2022-12-19
Payer: MEDICARE

## 2022-12-19 VITALS — HEART RATE: 67 BPM | OXYGEN SATURATION: 99 %

## 2022-12-19 DIAGNOSIS — M17.12 PRIMARY OSTEOARTHRITIS OF LEFT KNEE: Primary | ICD-10-CM

## 2022-12-19 RX ORDER — TRIAMCINOLONE ACETONIDE 40 MG/ML
40 INJECTION, SUSPENSION INTRA-ARTICULAR; INTRAMUSCULAR ONCE
Status: COMPLETED | OUTPATIENT
Start: 2022-12-19 | End: 2022-12-19

## 2022-12-19 RX ADMIN — TRIAMCINOLONE ACETONIDE 40 MG: 40 INJECTION, SUSPENSION INTRA-ARTICULAR; INTRAMUSCULAR at 14:26:00

## 2022-12-19 NOTE — PROCEDURES
Patient reports her knee is feeling much better following the aspiration in the operating room. She still does have some swelling, there is some palpable fullness/effusion in the suprapatellar pouch, however likely coagulated blood at this point and difficult to remove without an open procedure. Did encourage continued icing elevation and wrapping. Counseled that it may take some months for this to resolve. After informed consent, the patient's left knee was marked, locally anesthetized with skin refrigerant, prepped with topical antiseptic, and injected with a mixture of 1mL 40mg/mL Kenalog, 2mL 1% lidocaine and 2mL 0.5% marcaine through the inferolateral portal.  A band-aid was applied. The patient tolerated the procedure well.     Jamar Hooker MD, [address] [facility]opedic Surgery  Phone [phone]  Fax [phone]

## 2023-01-19 RX ORDER — METOPROLOL TARTRATE 50 MG/1
25 TABLET, FILM COATED ORAL 2 TIMES DAILY
Qty: 90 TABLET | Refills: 1 | Status: SHIPPED | OUTPATIENT
Start: 2023-01-19

## 2023-01-19 RX ORDER — METOPROLOL TARTRATE 50 MG/1
TABLET, FILM COATED ORAL
Qty: 90 TABLET | Refills: 3 | Status: SHIPPED | OUTPATIENT
Start: 2023-01-19

## 2023-01-19 NOTE — TELEPHONE ENCOUNTER
Hypertension Medications Protocol Passed 01/19/2023 05:47 AM   Protocol Details  CMP or BMP in past 12 months    Last serum creatinine< 2.0    Appointment in past 6 or next 3 months        LOV 10/18/22 tb    LAST LAB  10/13/22     LAST RX 11/15/21 90 with 3      Next OV   Future Appointments   Date Time Provider Nery Stephania   1/25/2023 10:00 AM Gerhard Kat MD [address] [facility] ONC [facility]   2/1/2023 10:40 AM Stanislav Julien MD [facility]         PROTOCOL pass

## 2023-01-24 ENCOUNTER — TELEPHONE (OUTPATIENT)
Dept: ORTHOPEDICS CLINIC | Facility: CLINIC | Age: 78
End: 2023-01-24

## 2023-01-24 ENCOUNTER — HOSPITAL ENCOUNTER (OUTPATIENT)
Dept: GENERAL RADIOLOGY | Facility: HOSPITAL | Age: 78
Discharge: HOME | End: 2023-01-24
Attending: PHYSICIAN ASSISTANT
Payer: MEDICARE

## 2023-01-24 DIAGNOSIS — M17.12 PRIMARY OSTEOARTHRITIS OF LEFT KNEE: ICD-10-CM

## 2023-01-24 PROCEDURE — 73564 X-RAY EXAM KNEE 4 OR MORE: CPT | Performed by: PHYSICIAN ASSISTANT

## 2023-01-24 NOTE — TELEPHONE ENCOUNTER
Accidentally marked last encounter as erroneous and so I am documenting what that encounter entailed.  routed to clinical pool asking if XR's were needed, an MA pended an order and routed to [employer]. Jayden Domonique signed order and routed back to clinical pool. MA scheduled XRs and routed to  pool. I contacted the patient via [employer] asking them to arrive 15-20 minutes early to complete imaging. I addended the encounter to document that [employer] message. I accidentally created another addendum which I marked as erroneous thinking it will just remove the addendum but it removed the entire encounter.

## 2023-01-25 ENCOUNTER — APPOINTMENT (OUTPATIENT)
Dept: HEMATOLOGY/ONCOLOGY | Facility: HOSPITAL | Age: 78
End: 2023-01-25
Attending: INTERNAL MEDICINE
Payer: MEDICARE

## 2023-01-25 ENCOUNTER — PATIENT MESSAGE (OUTPATIENT)
Dept: ORTHOPEDICS CLINIC | Facility: CLINIC | Age: 78
End: 2023-01-25

## 2023-01-25 ENCOUNTER — OFFICE VISIT (OUTPATIENT)
Dept: ORTHOPEDICS CLINIC | Facility: CLINIC | Age: 78
End: 2023-01-25
Payer: MEDICARE

## 2023-01-25 VITALS — OXYGEN SATURATION: 97 % | WEIGHT: 162 LBS | HEART RATE: 59 BPM | HEIGHT: 66 IN | BODY MASS INDEX: 26.03 KG/M2

## 2023-01-25 DIAGNOSIS — M17.12 PRIMARY OSTEOARTHRITIS OF LEFT KNEE: Primary | ICD-10-CM

## 2023-01-25 PROCEDURE — 99213 OFFICE O/P EST LOW 20 MIN: CPT | Performed by: PHYSICIAN ASSISTANT

## 2023-01-25 RX ORDER — TRAMADOL HYDROCHLORIDE 50 MG/1
50 TABLET ORAL EVERY 12 HOURS PRN
Qty: 30 TABLET | Refills: 0 | Status: SHIPPED | OUTPATIENT
Start: 2023-01-25

## 2023-01-25 NOTE — TELEPHONE ENCOUNTER
From: Sugar Petersen  To: Adrianna Bah  Sent: 1/24/2023 10:43 AM CST  Subject: Upcoming Appointment with Milo Amezquita PA-C    Good Morning! I am reaching out regarding your upcoming appointment with Milo Amezquita PA-C. Elsa Campbell would like X-rays completed prior to your appointment. I have ordered and scheduled these for you. You may or may not receive a [email or web address] notification regarding the X-Ray appointment, please disregard as this is just for scheduling purposes. No matter what that appointment time says, please arrive 15-20 minutes prior to your appointment with Elsa Campbell in order to complete this imaging right across the sequeira from our office. If you have any questions, please feel free to reply to this message or call our office at [phone].   Thank you,  Trang Sotelo MA

## 2023-01-25 NOTE — TELEPHONE ENCOUNTER
Last Imaging  XR KNEE, COMPLETE (4 OR MORE VIEWS), LEFT (ERY=[id number])  Narrative: PROCEDURE:  XR KNEE, COMPLETE (4 OR MORE VIEWS), LEFT (IPA=[id number])     LOCATION:                                           TECHNIQUE:  AP, lateral, sunrise, and tunnel views were obtained     COMPARISON:  [facility] , XR, XR KNEE (1 OR 2 VIEWS), LEFT (CPT=73560), 12/17/2022, 4:49 PM.  [facility], XR, XR KNEE, COMPLETE (4 OR MORE VIEWS), LEFT (CZR=[id number]), 10/27/2021, 8:33 AM.     INDICATIONS:  M17.12 Primary osteoarthritis of left knee     PATIENT STATED HISTORY: (As transcribed by Technologist)  Patient states her left knee gave out and fell on her left side on Saturday 1/21/23. Patient adds that she has no knee pain but has pain going down her leg to the lateral surface. FINDINGS:    BONES:  There is mild disc space narrowing in all 3 compartments of the knee joint with marginal osteophyte formation. There is no fracture. SOFT TISSUES:  There is moderate joint effusion although this is decreased since previous study. EFFUSION:  Moderate joint effusion is decreased since previous study. OTHER:  Negative. Impression: CONCLUSION:  There is tricompartmental osteoarthritis. There is moderate joint effusion which is decreased since previous study.         Dictated by (CST): Jai Barfield MD on 1/24/2023 at 1:34 PM       Finalized by (CST): Jai Barfield MD on 1/24/2023 at 1:35 PM          Future Appointments   Date Time Provider [facility] [place]   1/25/2023  9:00 AM Roxana Hanson PA-C [facility]   2/1/2023 10:40 AM Cesar Greenwood MD [facility]

## 2023-01-25 NOTE — PROGRESS NOTES
[facility] Progress Note    Subjective: Patient returns to clinic after last being seen on 12/19/2022 where a steroid injection was administered into the left knee. She reports that on 1/20/2023, while descending steps from her house she felt her left knee buckle. Since then, she has had recurrence of her left knee pain with radiating pain to the proximal lateral shin. She has been taking Tylenol arthritis which has been helpful as well as tramadol as needed which was given to her at the emergency department. She is on Eliquis so she cannot take anti-inflammatory medications. Denies any recurrent clicking or popping. Objective: Patient alert and oriented. Seated comfortably on the exam chair. Knee can fully straighten 0 degrees and flex approximate 120 degrees. Slight tenderness palpation along the lateral joint line but no significant tenderness along the medial joint line or popliteal space. Some tenderness also along the proximal lateral shin but ankle dorsiflexion and eversion are well-maintained. Imaging: Radiographs of the left knee obtained on 1/20/2023 personally viewed, independently interpreted and radiology report read. Radiographs demonstrate osteophyte along the lateral tibial plateau with osteophytic lipping along the lateral femoral condyle and medial subchondral sclerosis with mild medial significant joint space narrowing. Assessment/Plan: Discussed with patient her exacerbated left knee arthritis different treatment recommendations at this point in time. We discussed knee stabilization with a knee brace and physical therapy to strengthen the dynamic stabilizers of the knee. External referral to physical therapy was written at today's visit and a knee brace was provided to the patient in clinic today. Since Dr. Luis Mahajan did previously prescribe tramadol to be used as needed for the patient in 2020, I sent a small prescription of tramadol to her pharmacy.   She will continue taking Tylenol arthritis regularly for pain control and will use tramadol as needed. We did discuss the possibility of viscosupplementation in the future, however I did discuss the limited efficacy with viscosupplementation as compared to intra-articular corticosteroids. She will contact me via [employer] with any questions or concerns. Otherwise, she may follow-up with us as early as March for repeat corticosteroid injection into the left knee. Her questions were sought and answered satisfactorily. She is happy with the plan and will follow as advised. Shadi Garcia PA-C  [facility] Orth[facility] Surgery    This note was dictated using Dragon software. While it was briefly proofread prior to completion, some grammatical, spelling, and word choice errors due to dictation may still occur.

## 2023-02-01 ENCOUNTER — OFFICE VISIT (OUTPATIENT)
Dept: ORTHOPEDICS CLINIC | Facility: CLINIC | Age: 78
End: 2023-02-01
Payer: MEDICARE

## 2023-02-01 ENCOUNTER — MED REC SCAN ONLY (OUTPATIENT)
Dept: ORTHOPEDICS CLINIC | Facility: CLINIC | Age: 78
End: 2023-02-01

## 2023-02-01 VITALS — OXYGEN SATURATION: 99 % | HEART RATE: 54 BPM

## 2023-02-01 DIAGNOSIS — M16.12 PRIMARY OSTEOARTHRITIS OF LEFT HIP: Primary | ICD-10-CM

## 2023-02-01 PROCEDURE — 20611 DRAIN/INJ JOINT/BURSA W/US: CPT | Performed by: ORTHOPAEDIC SURGERY

## 2023-02-01 RX ORDER — TRIAMCINOLONE ACETONIDE 40 MG/ML
40 INJECTION, SUSPENSION INTRA-ARTICULAR; INTRAMUSCULAR ONCE
Status: COMPLETED | OUTPATIENT
Start: 2023-02-01 | End: 2023-02-01

## 2023-02-01 RX ADMIN — TRIAMCINOLONE ACETONIDE 40 MG: 40 INJECTION, SUSPENSION INTRA-ARTICULAR; INTRAMUSCULAR at 11:16:00

## 2023-02-01 NOTE — PROCEDURES
Last hip injection was nearly 9 months ago. She reports that the pain has recurred, mostly around the groin. She would like to repeat injection today. After informed consent, the patient's left hip was marked, prepped with topical antiseptic, and locally anesthetized with skin refrigerant followed by injection of 1% lidocaine to create a skin wheal anteriorly at an insertion site a few fingerbreadths lateral to the femoral pulse, along the trajectory of the femoral neck. Next, the area was re-prepped with topical antiseptic, and ultrasound guidance was performed to direct a 20 gauge spinal needle into the hip joint at the junction of the femoral head and neck, after which a mixture of 1mL 40mg/mL Kenalog, 2mL 1% lidocaine and 2mL 0.5% marcaine was injected while visualizing the fluid under ultrasound. Confirmatory imaging was saved to the patient's chart. A band-aid was applied. The patient tolerated the procedure well. If injections lose any effectiveness would consider repeating hip x-rays.     Nick Franklin MD, [address] Orthopedic Surgery  Phone [phone]  Fax [phone]

## 2023-02-06 ENCOUNTER — OFFICE VISIT (OUTPATIENT)
Dept: HEMATOLOGY/ONCOLOGY | Facility: HOSPITAL | Age: 78
End: 2023-02-06
Attending: INTERNAL MEDICINE
Payer: MEDICARE

## 2023-02-06 VITALS
WEIGHT: 159.5 LBS | BODY MASS INDEX: 26 KG/M2 | SYSTOLIC BLOOD PRESSURE: 97 MMHG | DIASTOLIC BLOOD PRESSURE: 78 MMHG | RESPIRATION RATE: 18 BRPM | TEMPERATURE: 97 F | OXYGEN SATURATION: 99 % | HEART RATE: 58 BPM

## 2023-02-06 DIAGNOSIS — Z86.711 HISTORY OF PULMONARY EMBOLUS (PE): Primary | ICD-10-CM

## 2023-02-06 DIAGNOSIS — Z71.89 ENCOUNTER FOR ANTICOAGULATION DISCUSSION AND COUNSELING: ICD-10-CM

## 2023-02-06 DIAGNOSIS — Z79.01 CHRONIC ANTICOAGULATION: ICD-10-CM

## 2023-02-06 DIAGNOSIS — D68.52 PROTHROMBIN GENE MUTATION (HCC): ICD-10-CM

## 2023-02-06 DIAGNOSIS — I26.09 OTHER PULMONARY EMBOLISM WITH ACUTE COR PULMONALE, UNSPECIFIED CHRONICITY (HCC): ICD-10-CM

## 2023-02-06 LAB
ALBUMIN SERPL-MCNC: 3.5 G/DL (ref 3.4–5)
ALBUMIN/GLOB SERPL: 0.9 {RATIO} (ref 1–2)
ALP LIVER SERPL-CCNC: 61 U/L
ALT SERPL-CCNC: 20 U/L
ANION GAP SERPL CALC-SCNC: 3 MMOL/L (ref 0–18)
AST SERPL-CCNC: 14 U/L (ref 15–37)
BASOPHILS # BLD AUTO: 0.06 X10(3) UL (ref 0–0.2)
BASOPHILS NFR BLD AUTO: 0.8 %
BILIRUB SERPL-MCNC: 0.4 MG/DL (ref 0.1–2)
BUN BLD-MCNC: 11 MG/DL (ref 7–18)
CALCIUM BLD-MCNC: 9.1 MG/DL (ref 8.5–10.1)
CHLORIDE SERPL-SCNC: 103 MMOL/L (ref 98–112)
CO2 SERPL-SCNC: 28 MMOL/L (ref 21–32)
CREAT BLD-MCNC: 0.66 MG/DL
EOSINOPHIL # BLD AUTO: 0.12 X10(3) UL (ref 0–0.7)
EOSINOPHIL NFR BLD AUTO: 1.7 %
ERYTHROCYTE [DISTWIDTH] IN BLOOD BY AUTOMATED COUNT: 12.7 %
FASTING STATUS PATIENT QL REPORTED: NO
GFR SERPLBLD BASED ON 1.73 SQ M-ARVRAT: 91 ML/MIN/1.73M2 (ref 60–?)
GLOBULIN PLAS-MCNC: 3.9 G/DL (ref 2.8–4.4)
GLUCOSE BLD-MCNC: 116 MG/DL (ref 70–99)
HCT VFR BLD AUTO: 38.2 %
HGB BLD-MCNC: 12.8 G/DL
IMM GRANULOCYTES # BLD AUTO: 0.02 X10(3) UL (ref 0–1)
IMM GRANULOCYTES NFR BLD: 0.3 %
LYMPHOCYTES # BLD AUTO: 1.58 X10(3) UL (ref 1–4)
LYMPHOCYTES NFR BLD AUTO: 22 %
MCH RBC QN AUTO: 32 PG (ref 26–34)
MCHC RBC AUTO-ENTMCNC: 33.5 G/DL (ref 31–37)
MCV RBC AUTO: 95.5 FL
MONOCYTES # BLD AUTO: 0.79 X10(3) UL (ref 0.1–1)
MONOCYTES NFR BLD AUTO: 11 %
NEUTROPHILS # BLD AUTO: 4.62 X10 (3) UL (ref 1.5–7.7)
NEUTROPHILS # BLD AUTO: 4.62 X10(3) UL (ref 1.5–7.7)
NEUTROPHILS NFR BLD AUTO: 64.2 %
OSMOLALITY SERPL CALC.SUM OF ELEC: 278 MOSM/KG (ref 275–295)
PLATELET # BLD AUTO: 291 10(3)UL (ref 150–450)
POTASSIUM SERPL-SCNC: 3.9 MMOL/L (ref 3.5–5.1)
PROT SERPL-MCNC: 7.4 G/DL (ref 6.4–8.2)
RBC # BLD AUTO: 4 X10(6)UL
SODIUM SERPL-SCNC: 134 MMOL/L (ref 136–145)
WBC # BLD AUTO: 7.2 X10(3) UL (ref 4–11)

## 2023-02-06 PROCEDURE — 99214 OFFICE O/P EST MOD 30 MIN: CPT | Performed by: INTERNAL MEDICINE

## 2023-03-01 ENCOUNTER — MED REC SCAN ONLY (OUTPATIENT)
Dept: ORTHOPEDICS CLINIC | Facility: CLINIC | Age: 78
End: 2023-03-01

## 2023-03-21 ENCOUNTER — TELEPHONE (OUTPATIENT)
Dept: ORTHOPEDICS CLINIC | Facility: CLINIC | Age: 78
End: 2023-03-21

## 2023-03-21 NOTE — TELEPHONE ENCOUNTER
Pt with bilat foot numbness. Will hold for eval on imaging. Okay for pt to be scheduled?     10/19/22 PCP note:  Numbness of feet- pt to start wearing orthotics again as this relieves her symptoms, f/u podiatrist prn

## 2023-03-21 NOTE — TELEPHONE ENCOUNTER
Bilateral Foot numbness. Please advise if imaging is needed.   Future Appointments   Date Time Provider Nery Cat   4/12/2023 11:00 AM Albino Harding DPM [facility]   8/7/2023 10:30 AM Rosemary Foley MD [address] [facility]

## 2023-03-22 ENCOUNTER — OFFICE VISIT (OUTPATIENT)
Dept: ORTHOPEDICS CLINIC | Facility: CLINIC | Age: 78
End: 2023-03-22
Payer: MEDICARE

## 2023-03-22 VITALS — HEIGHT: 66 IN | BODY MASS INDEX: 24.43 KG/M2 | WEIGHT: 152 LBS

## 2023-03-22 DIAGNOSIS — R20.0 NUMBNESS IN FEET: ICD-10-CM

## 2023-03-22 DIAGNOSIS — M20.41 HAMMER TOES OF BOTH FEET: ICD-10-CM

## 2023-03-22 DIAGNOSIS — M20.42 HAMMER TOES OF BOTH FEET: ICD-10-CM

## 2023-03-22 DIAGNOSIS — M21.619 BUNION: ICD-10-CM

## 2023-03-22 DIAGNOSIS — M35.7 BENIGN JOINT HYPERMOBILITY: Primary | ICD-10-CM

## 2023-03-22 PROCEDURE — 99203 OFFICE O/P NEW LOW 30 MIN: CPT | Performed by: PODIATRIST

## 2023-03-22 PROCEDURE — 1126F AMNT PAIN NOTED NONE PRSNT: CPT | Performed by: PODIATRIST

## 2023-04-06 DIAGNOSIS — I26.09 OTHER PULMONARY EMBOLISM WITH ACUTE COR PULMONALE, UNSPECIFIED CHRONICITY (HCC): ICD-10-CM

## 2023-05-19 ENCOUNTER — HOSPITAL ENCOUNTER (OUTPATIENT)
Age: 78
Discharge: HOME | End: 2023-05-19
Payer: MEDICARE

## 2023-05-19 VITALS
WEIGHT: 155 LBS | RESPIRATION RATE: 20 BRPM | DIASTOLIC BLOOD PRESSURE: 87 MMHG | SYSTOLIC BLOOD PRESSURE: 148 MMHG | HEIGHT: 66 IN | HEART RATE: 53 BPM | BODY MASS INDEX: 24.91 KG/M2 | OXYGEN SATURATION: 98 % | TEMPERATURE: 98 F

## 2023-05-19 DIAGNOSIS — J30.1 SEASONAL ALLERGIC RHINITIS DUE TO POLLEN: Primary | ICD-10-CM

## 2023-05-19 DIAGNOSIS — J01.90 ACUTE NON-RECURRENT SINUSITIS, UNSPECIFIED LOCATION: ICD-10-CM

## 2023-05-19 PROCEDURE — 99213 OFFICE O/P EST LOW 20 MIN: CPT | Performed by: NURSE PRACTITIONER

## 2023-05-19 RX ORDER — MOMETASONE FUROATE 50 UG/1
1 SPRAY, METERED NASAL DAILY
Qty: 17 G | Refills: 0 | Status: SHIPPED | OUTPATIENT
Start: 2023-05-19 | End: 2023-06-02

## 2023-05-19 NOTE — DISCHARGE INSTRUCTIONS
Most people get better in 10 to 14 days. You may continue to cough for 2 to 3 weeks. Colds are caused by viruses and do not get better with antibiotics.   Start taking one of the following allergy medications Zyrtec, Claritin or Xyzal daily  Cough syrup such as Delsym or Robitussin can help  Humidifier in your room at night  Drink plenty of fluids  Rest, Tylenol and Motrin for aches and pains  Return for fever, shortness of breath, chest pain

## 2023-05-19 NOTE — ED INITIAL ASSESSMENT (HPI)
Nasal congestion bilateral ear pain and post nasal drip/sore throat since Monday. Allergy, sinus and tylenol meds OTC with some relief.  But worse at night and in AM.

## 2023-08-07 ENCOUNTER — APPOINTMENT (OUTPATIENT)
Dept: HEMATOLOGY/ONCOLOGY | Facility: HOSPITAL | Age: 78
End: 2023-08-07
Attending: INTERNAL MEDICINE
Payer: MEDICARE

## 2023-08-10 ENCOUNTER — TELEPHONE (OUTPATIENT)
Dept: ORTHOPEDICS CLINIC | Facility: CLINIC | Age: 78
End: 2023-08-10

## 2023-08-10 NOTE — TELEPHONE ENCOUNTER
Patient is coming in for LT KNEE PAIN. Last Xray was 1/24/23. Please advise if imaging needs to be updated.    Future Appointments   Date Time Provider Nery Cat   8/16/2023 10:30 AM Trenton Billingsley MD [address] [facility]   9/20/2023  3:00 PM Anna Huang MD [facility]   10/16/2023  1:50 PM Micheline Wright MD [facility]

## 2023-08-16 ENCOUNTER — OFFICE VISIT (OUTPATIENT)
Dept: HEMATOLOGY/ONCOLOGY | Facility: HOSPITAL | Age: 78
End: 2023-08-16
Attending: INTERNAL MEDICINE
Payer: MEDICARE

## 2023-08-16 VITALS
DIASTOLIC BLOOD PRESSURE: 80 MMHG | HEART RATE: 55 BPM | BODY MASS INDEX: 26 KG/M2 | TEMPERATURE: 97 F | WEIGHT: 161.38 LBS | OXYGEN SATURATION: 96 % | SYSTOLIC BLOOD PRESSURE: 137 MMHG | RESPIRATION RATE: 18 BRPM

## 2023-08-16 DIAGNOSIS — D68.52 PROTHROMBIN GENE MUTATION (HCC): ICD-10-CM

## 2023-08-16 DIAGNOSIS — Z86.711 HISTORY OF PULMONARY EMBOLUS (PE): Primary | ICD-10-CM

## 2023-08-16 DIAGNOSIS — Z71.89 ENCOUNTER FOR ANTICOAGULATION DISCUSSION AND COUNSELING: ICD-10-CM

## 2023-08-16 DIAGNOSIS — I26.09 OTHER PULMONARY EMBOLISM WITH ACUTE COR PULMONALE, UNSPECIFIED CHRONICITY (HCC): ICD-10-CM

## 2023-08-16 DIAGNOSIS — Z79.01 CHRONIC ANTICOAGULATION: ICD-10-CM

## 2023-08-16 LAB
ALBUMIN SERPL-MCNC: 3.4 G/DL (ref 3.4–5)
ALBUMIN/GLOB SERPL: 0.9 {RATIO} (ref 1–2)
ALP LIVER SERPL-CCNC: 60 U/L
ALT SERPL-CCNC: 22 U/L
ANION GAP SERPL CALC-SCNC: 1 MMOL/L (ref 0–18)
AST SERPL-CCNC: 15 U/L (ref 15–37)
BASOPHILS # BLD AUTO: 0.06 X10(3) UL (ref 0–0.2)
BASOPHILS NFR BLD AUTO: 0.8 %
BILIRUB SERPL-MCNC: 0.4 MG/DL (ref 0.1–2)
BUN BLD-MCNC: 12 MG/DL (ref 7–18)
CALCIUM BLD-MCNC: 8.9 MG/DL (ref 8.5–10.1)
CHLORIDE SERPL-SCNC: 106 MMOL/L (ref 98–112)
CO2 SERPL-SCNC: 27 MMOL/L (ref 21–32)
CREAT BLD-MCNC: 0.7 MG/DL
EGFRCR SERPLBLD CKD-EPI 2021: 89 ML/MIN/1.73M2 (ref 60–?)
EOSINOPHIL # BLD AUTO: 0.17 X10(3) UL (ref 0–0.7)
EOSINOPHIL NFR BLD AUTO: 2.2 %
ERYTHROCYTE [DISTWIDTH] IN BLOOD BY AUTOMATED COUNT: 12.4 %
GLOBULIN PLAS-MCNC: 4 G/DL (ref 2.8–4.4)
GLUCOSE BLD-MCNC: 84 MG/DL (ref 70–99)
HCT VFR BLD AUTO: 37.9 %
HGB BLD-MCNC: 12.5 G/DL
IMM GRANULOCYTES # BLD AUTO: 0.03 X10(3) UL (ref 0–1)
IMM GRANULOCYTES NFR BLD: 0.4 %
LYMPHOCYTES # BLD AUTO: 2.37 X10(3) UL (ref 1–4)
LYMPHOCYTES NFR BLD AUTO: 30.8 %
MCH RBC QN AUTO: 31.3 PG (ref 26–34)
MCHC RBC AUTO-ENTMCNC: 33 G/DL (ref 31–37)
MCV RBC AUTO: 94.8 FL
MONOCYTES # BLD AUTO: 0.83 X10(3) UL (ref 0.1–1)
MONOCYTES NFR BLD AUTO: 10.8 %
NEUTROPHILS # BLD AUTO: 4.24 X10 (3) UL (ref 1.5–7.7)
NEUTROPHILS # BLD AUTO: 4.24 X10(3) UL (ref 1.5–7.7)
NEUTROPHILS NFR BLD AUTO: 55 %
OSMOLALITY SERPL CALC.SUM OF ELEC: 277 MOSM/KG (ref 275–295)
PLATELET # BLD AUTO: 264 10(3)UL (ref 150–450)
POTASSIUM SERPL-SCNC: 4.2 MMOL/L (ref 3.5–5.1)
PROT SERPL-MCNC: 7.4 G/DL (ref 6.4–8.2)
RBC # BLD AUTO: 4 X10(6)UL
SODIUM SERPL-SCNC: 134 MMOL/L (ref 136–145)
WBC # BLD AUTO: 7.7 X10(3) UL (ref 4–11)

## 2023-08-16 PROCEDURE — 99214 OFFICE O/P EST MOD 30 MIN: CPT | Performed by: INTERNAL MEDICINE

## 2023-08-16 NOTE — PROGRESS NOTES
Education Record     Learner:  Patient     Disease / Diagnosis: DVT/PE     Barriers / Limitations:  None                Comments:     Method:  Brief focused                Comments:     General Topics:  Plan of care reviewed                Comments:     Outcome:  Shows understanding                Comments:pt here for 6 month f/up. Pt states she is taking eliquis as instructed without complications. states she has a cortisone injection in her knee 9/27.

## 2023-08-21 ENCOUNTER — OFFICE VISIT (OUTPATIENT)
Dept: ORTHOPEDICS CLINIC | Facility: CLINIC | Age: 78
End: 2023-08-21
Payer: MEDICARE

## 2023-08-21 VITALS — WEIGHT: 161 LBS | HEIGHT: 66.5 IN | BODY MASS INDEX: 25.57 KG/M2

## 2023-08-21 DIAGNOSIS — M17.12 PRIMARY OSTEOARTHRITIS OF LEFT KNEE: Primary | ICD-10-CM

## 2023-08-21 RX ORDER — TRIAMCINOLONE ACETONIDE 40 MG/ML
40 INJECTION, SUSPENSION INTRA-ARTICULAR; INTRAMUSCULAR ONCE
Status: COMPLETED | OUTPATIENT
Start: 2023-08-21 | End: 2023-08-21

## 2023-08-21 RX ADMIN — TRIAMCINOLONE ACETONIDE 40 MG: 40 INJECTION, SUSPENSION INTRA-ARTICULAR; INTRAMUSCULAR at 11:43:00

## 2023-08-21 NOTE — PROCEDURES
After informed consent following discussion of risks and benefits, the patient's left knee was marked, locally anesthetized with skin refrigerant, prepped with topical antiseptic, and injected with a mixture of 1mL 40mg/mL Kenalog, 2mL 1% lidocaine and 2mL 0.5% marcaine through the inferolateral portal.  A band-aid was applied. The patient tolerated the procedure well.     Ashley Patel MD, [address] [place] [facility]  Phone [phone]  Fax [phone]

## 2023-10-16 ENCOUNTER — OFFICE VISIT (OUTPATIENT)
Dept: NEUROLOGY | Facility: CLINIC | Age: 78
End: 2023-10-16
Payer: MEDICARE

## 2023-10-16 ENCOUNTER — LAB ENCOUNTER (OUTPATIENT)
Dept: LAB | Facility: HOSPITAL | Age: 78
End: 2023-10-16
Attending: Other
Payer: MEDICARE

## 2023-10-16 VITALS
HEIGHT: 66 IN | HEART RATE: 72 BPM | WEIGHT: 162 LBS | DIASTOLIC BLOOD PRESSURE: 72 MMHG | RESPIRATION RATE: 18 BRPM | BODY MASS INDEX: 26.03 KG/M2 | OXYGEN SATURATION: 98 % | SYSTOLIC BLOOD PRESSURE: 142 MMHG

## 2023-10-16 DIAGNOSIS — G62.9 PERIPHERAL POLYNEUROPATHY: ICD-10-CM

## 2023-10-16 DIAGNOSIS — R20.2 NUMBNESS AND TINGLING OF BOTH FEET: ICD-10-CM

## 2023-10-16 DIAGNOSIS — R20.0 NUMBNESS AND TINGLING OF BOTH FEET: ICD-10-CM

## 2023-10-16 LAB — VIT B12 SERPL-MCNC: 487 PG/ML (ref 193–986)

## 2023-10-16 PROCEDURE — 83521 IG LIGHT CHAINS FREE EACH: CPT

## 2023-10-16 PROCEDURE — 82607 VITAMIN B-12: CPT

## 2023-10-16 PROCEDURE — 86225 DNA ANTIBODY NATIVE: CPT

## 2023-10-16 PROCEDURE — 86038 ANTINUCLEAR ANTIBODIES: CPT

## 2023-10-16 PROCEDURE — 84446 ASSAY OF VITAMIN E: CPT

## 2023-10-16 PROCEDURE — 36415 COLL VENOUS BLD VENIPUNCTURE: CPT

## 2023-10-16 PROCEDURE — 86334 IMMUNOFIX E-PHORESIS SERUM: CPT

## 2023-10-16 PROCEDURE — 84165 PROTEIN E-PHORESIS SERUM: CPT

## 2023-10-16 PROCEDURE — 99204 OFFICE O/P NEW MOD 45 MIN: CPT | Performed by: OTHER

## 2023-10-16 NOTE — PROGRESS NOTES
HPI:    Patient ID: Awais Lindo is a 69year old female. 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 She states has noticed some footing issues but no significant balance problem. She denies any tingling and numbness in hands. No history of diabetes or prediabetes. History of prothrombin gene mutation and had DVT and PE in 2016 and since then on Eliquis. HISTORY:  Past Medical History:   Diagnosis Date    Blood disorder     DVT, bilateral lower limbs ([facility] [address] 3/12/16    Essential hypertension     History of DVT of lower extremity 03/12/2016    Pulmonary embolism (HCC)     Pulmonary embolism with acute cor pulmonale ([facility] [address] 3/12/16    Visual impairment     contacts and glasses      Past Surgical History:   Procedure Laterality Date    COLONOSCOPY      COLONOSCOPY N/A 11/11/2021    Procedure: COLONOSCOPY with biopsy and forcep polypectomy;  Surgeon: Richi Jiménez MD;  Location: [address] [place] [address] ENDOSCOPY    OTHER SURGICAL HISTORY  3/12/16    catheters inserted into lungs      Family History   Problem Relation Age of Onset    Cancer Father 80        prostate     Heart Disorder Father     High Blood Pressure Father     Other (Other) Father     Cancer Mother         lung, was a smoker    Heart Disorder Mother     High Blood Pressure Mother     Other (Other) Sister     Asthma Paternal Grandfather     Cancer Maternal Grandfather     Heart Disorder Maternal Grandmother     Diabetes Sister     Fibromyalgia Sister     Breast Cancer Maternal Cousin Female 61    Clotting Disorder Neg       Social History     Socioeconomic History    Marital status:     Tobacco Use    Smoking status: Never    Smokeless tobacco: Never   Vaping Use    Vaping Use: Never used   Substance and Sexual Activity    Alcohol use: Not Currently     Alcohol/week: 0.0 standard drinks of alcohol    Drug use: No    Sexual activity: Yes     Partners: Male   Social History Narrative    . Her   of lung cancer, he was a smoker. She has 1 child and 2 grandchildren. Fartun Erwin worked in [address] [place] [address], retired 2019. Review of Systems   Constitutional: Negative. HENT: Negative. Eyes: Negative. Respiratory: Negative. Cardiovascular: Negative. Gastrointestinal: Negative. Endocrine: Negative. Genitourinary: Negative. Musculoskeletal: Negative. Skin: Negative. Allergic/Immunologic: Negative. Neurological:  Positive for weakness and numbness. Hematological: Negative. Psychiatric/Behavioral: Negative. All other systems reviewed and are negative. Current Outpatient Medications   Medication Sig Dispense Refill    apixaban (ELIQUIS) 2.5 MG Oral Tab Take 1 tablet (2.5 mg total) by mouth 2 (two) times daily. 180 tablet 3    METOPROLOL TARTRATE 50 MG Oral Tab TAKE ONE-HALF TABLET BY  MOUTH TWICE DAILY 90 tablet 3    MONTELUKAST 10 MG Oral Tab TAKE 1 TABLET BY MOUTH  DAILY AS NEEDED 90 tablet 3    AMLODIPINE 5 MG Oral Tab TAKE 1 TABLET BY MOUTH  DAILY 90 tablet 3    Neomycin-Polymyxin-HC 1 % Otic Solution 4 drops to affected ear TID for 10 days, may repeat course as directed 90 mL 0    Biotin w/ Vitamins C & E (HAIR/SKIN/NAILS OR) Take by mouth daily. Vitamin D3, Cholecalciferol, 1000 UNITS Oral Cap Take 1 capsule (1,000 Units total) by mouth 2 (two) times daily. Multiple Vitamins-Minerals (SENTRY SENIOR) Oral Tab Take by mouth daily. OCUVITE Oral Tab Take 1 tablet by mouth daily as needed. Allergies: Other                   UNKNOWN    Comment:Hay fever  PHYSICAL EXAM:   Physical Exam    Blood pressure 142/72, pulse 72, resp. rate 18, height 66\", weight 162 lb (73.5 kg), SpO2 98%, not currently breastfeeding. General Appearance: Well nourished, well developed, no apparent distress.      HEENT: Normocephalic and atraumatic. Normal sclera. Moist mucus membrane  Neck: Normal range of motion. Neck supple. Cardiovascular: Normal rate, regular rhythm and normal heart sounds. Pulmonary/Chest: Effort normal and breath sounds normal.   Abdominal: Soft. Bowel sounds are normal.   Skin: dry, clean and intact  Ext: peripheral pulses present  Psych: normal mood and affect    Neurological:  Patient is awake, alert and oriented to person, place and time   Normal memory, attention/concentration, speech and language. Cranial Nerves:   II: Visual fields: normal  III: Pupils: equal, round, reactive to light  III,IV,VI: Extra Ocular Movements: intact  V: Facial sensation: intact  VII: Facial strength: intact  VIII: Hearing: intact  IX: Palate: intact  XI: Shoulder shrug: intact  XII: Tongue movement: normal    Motor: Strength is  5 out of 5 in all extremities bilaterally. + Hammer toes  DTR: 2+ triceps, 1+biceps, 3+ patellar. Absent achilles bilaterally    Sensory: Sensory examination is normal to light touch and pinprick except slightly decrease sensation to pinprick in toes. Moderate to severe vibration loss in feet. Position sense intact    Coordination: Finger-to-nose test normal bilaterally without evidence of dysmetria.     Gait: normal casual gait      TESTS/IMAGING:     Component      Latest Ref Rng 8/16/2023   Glucose      70 - 99 mg/dL 84    Sodium      136 - 145 mmol/L 134 (L)    Potassium      3.5 - 5.1 mmol/L 4.2    Chloride      98 - 112 mmol/L 106    Carbon Dioxide, Total      21.0 - 32.0 mmol/L 27.0    ANION GAP      0 - 18 mmol/L 1    BUN      7 - 18 mg/dL 12    CREATININE      0.55 - 1.02 mg/dL 0.70    CALCIUM      8.5 - 10.1 mg/dL 8.9    CALCULATED OSMOLALITY      275 - 295 mOsm/kg 277    EGFR      >=60 mL/min/1.73m2 89    AST (SGOT)      15 - 37 U/L 15    ALT (SGPT)      13 - 56 U/L 22    ALKALINE PHOSPHATASE      55 - 142 U/L 60    Total Bilirubin      0.1 - 2.0 mg/dL 0.4    PROTEIN, TOTAL      6.4 - 8.2 g/dL 7.4 Albumin      3.4 - 5.0 g/dL 3.4    Globulin      2.8 - 4.4 g/dL 4.0    A/G Ratio      1.0 - 2.0  0.9 (L)       Component      Latest Ref Rng 8/16/2023   WBC      4.0 - 11.0 x10(3) uL 7.7    RBC      3.80 - 5.30 x10(6)uL 4.00    Hemoglobin      12.0 - 16.0 g/dL 12.5    Hematocrit      35.0 - 48.0 % 37.9    Platelet Count      527.8 - 450.0 10(3)uL 264.0    MCV      80.0 - 100.0 fL 94.8    MCH      26.0 - 34.0 pg 31.3    MCHC      31.0 - 37.0 g/dL 33.0    RDW      % 12.4    Prelim Neutrophil Abs      1.50 - 7.70 x10 (3) uL 4.24    Neutrophils Absolute      1.50 - 7.70 x10(3) uL 4.24    Lymphocytes Absolute      1.00 - 4.00 x10(3) uL 2.37    Monocytes Absolute      0.10 - 1.00 x10(3) uL 0.83    Eosinophils Absolute      0.00 - 0.70 x10(3) uL 0.17    Basophils Absolute      0.00 - 0.20 x10(3) uL 0.06    Immature Granulocyte Absolute      0.00 - 1.00 x10(3) uL 0.03    Neutrophils %      % 55.0    Lymphocytes %      % 30.8    Monocytes %      % 10.8    Eosinophils %      % 2.2    Basophils %      % 0.8    Immature Granulocyte %      % 0.4          ASSESSMENT/PLAN:     (G62.9) Peripheral polyneuropathy  Plan: EMG (ELANA REA), Vitamin B12, Hemoglobin         A1C, Connective Tissue Disease (CICI) Screen,         Reflex Specific Antibody, Vitamin E, Serum,         Monoclonal Protein Study        (R20.0,  R20.2) Numbness and tingling of both feet      Peripheral neuropathy of unclear etiology ? idiopathic vs hereditary given hammer toes and pes cavus    Plan: EMG and NCS BLE  Check neuropathic labs    Advise proper foot wear and balance exercises    Follow up after the tests are completed        Thank you for allowing us to participate in your patient's care.     Total 45 minutes spent with patient >50% of visit was spent in counseling and coordination of care      Lore Aguilar MD  [address] This Visit:  Requested Prescriptions      No prescriptions requested or ordered in this encounter Imaging & Referrals:  None     #[id number]

## 2023-10-17 ENCOUNTER — HOSPITAL ENCOUNTER (OUTPATIENT)
Dept: MAMMOGRAPHY | Facility: HOSPITAL | Age: 78
Discharge: HOME | End: 2023-10-17
Attending: INTERNAL MEDICINE
Payer: MEDICARE

## 2023-10-17 ENCOUNTER — TELEPHONE (OUTPATIENT)
Dept: INTERNAL MEDICINE CLINIC | Facility: CLINIC | Age: 78
End: 2023-10-17

## 2023-10-17 DIAGNOSIS — Z12.31 ENCOUNTER FOR SCREENING MAMMOGRAM FOR MALIGNANT NEOPLASM OF BREAST: ICD-10-CM

## 2023-10-17 DIAGNOSIS — I10 BENIGN ESSENTIAL HTN: ICD-10-CM

## 2023-10-17 DIAGNOSIS — E78.9 ABNORMAL CHOLESTEROL TEST: Primary | ICD-10-CM

## 2023-10-17 DIAGNOSIS — Z12.31 ENCOUNTER FOR SCREENING MAMMOGRAM FOR BREAST CANCER: ICD-10-CM

## 2023-10-17 LAB
DSDNA IGG SERPL IA-ACNC: 0.6 IU/ML
ENA AB SER QL IA: 0.2 UG/L
ENA AB SER QL IA: NEGATIVE

## 2023-10-17 PROCEDURE — 77063 BREAST TOMOSYNTHESIS BI: CPT | Performed by: INTERNAL MEDICINE

## 2023-10-17 PROCEDURE — 77067 SCR MAMMO BI INCL CAD: CPT | Performed by: INTERNAL MEDICINE

## 2023-10-17 NOTE — TELEPHONE ENCOUNTER
Orders to    Edward           Pt aware to get labs done no sooner than 2 weeks prior to the appt.  Pt aware to fast.  No call back required.    Future Appointments   Date Time Provider Department Center   11/15/2023 10:00 AM Sofia Levin PA-C [facility] [address] [facility]

## 2023-10-18 LAB
ALBUMIN SERPL ELPH-MCNC: 4.42 G/DL (ref 3.75–5.21)
ALBUMIN/GLOB SERPL: 1.39 {RATIO} (ref 1–2)
ALPHA1 GLOB SERPL ELPH-MCNC: 0.27 G/DL (ref 0.19–0.46)
ALPHA2 GLOB SERPL ELPH-MCNC: 0.64 G/DL (ref 0.48–1.05)
B-GLOBULIN SERPL ELPH-MCNC: 0.99 G/DL (ref 0.68–1.23)
GAMMA GLOB SERPL ELPH-MCNC: 1.28 G/DL (ref 0.62–1.7)
HGBA1C: 6.4 %
KAPPA LC FREE SER-MCNC: 1.9 MG/DL (ref 0.33–1.94)
KAPPA LC FREE/LAMBDA FREE SER NEPH: 0.84 {RATIO} (ref 0.26–1.65)
LAMBDA LC FREE SERPL-MCNC: 2.27 MG/DL (ref 0.57–2.63)
PROT SERPL-MCNC: 7.6 G/DL (ref 6.4–8.2)

## 2023-10-20 LAB
VIT E ALPHA TOCO: 24.2 MG/L
VIT E GAMMA TOCO: 0.9 MG/L

## 2023-10-25 DIAGNOSIS — I26.09 OTHER PULMONARY EMBOLISM WITH ACUTE COR PULMONALE, UNSPECIFIED CHRONICITY (HCC): ICD-10-CM

## 2023-10-26 RX ORDER — APIXABAN 2.5 MG/1
2.5 TABLET, FILM COATED ORAL 2 TIMES DAILY
Qty: 180 TABLET | Refills: 0 | Status: SHIPPED | OUTPATIENT
Start: 2023-10-26

## 2023-11-10 ENCOUNTER — LAB ENCOUNTER (OUTPATIENT)
Dept: LAB | Facility: HOSPITAL | Age: 78
End: 2023-11-10
Attending: PHYSICIAN ASSISTANT
Payer: MEDICARE

## 2023-11-10 DIAGNOSIS — E78.9 ABNORMAL CHOLESTEROL TEST: ICD-10-CM

## 2023-11-10 DIAGNOSIS — I10 BENIGN ESSENTIAL HTN: ICD-10-CM

## 2023-11-10 LAB
CHOLEST SERPL-MCNC: 211 MG/DL (ref ?–200)
FASTING PATIENT LIPID ANSWER: YES
HDLC SERPL-MCNC: 72 MG/DL (ref 40–59)
LDLC SERPL CALC-MCNC: 120 MG/DL (ref ?–100)
NONHDLC SERPL-MCNC: 139 MG/DL (ref ?–130)
TRIGL SERPL-MCNC: 109 MG/DL (ref 30–149)
TSI SER-ACNC: 1.39 MIU/ML (ref 0.36–3.74)
VLDLC SERPL CALC-MCNC: 19 MG/DL (ref 0–30)

## 2023-11-10 PROCEDURE — 80061 LIPID PANEL: CPT

## 2023-11-10 PROCEDURE — 84443 ASSAY THYROID STIM HORMONE: CPT

## 2023-11-10 PROCEDURE — 36415 COLL VENOUS BLD VENIPUNCTURE: CPT

## 2023-11-14 PROBLEM — Z71.89 ENCOUNTER FOR ANTICOAGULATION DISCUSSION AND COUNSELING: Status: RESOLVED | Noted: 2023-02-06 | Resolved: 2023-11-14

## 2023-11-15 ENCOUNTER — OFFICE VISIT (OUTPATIENT)
Dept: ORTHOPEDICS CLINIC | Facility: CLINIC | Age: 78
End: 2023-11-15
Payer: MEDICARE

## 2023-11-15 ENCOUNTER — OFFICE VISIT (OUTPATIENT)
Dept: INTERNAL MEDICINE CLINIC | Facility: CLINIC | Age: 78
End: 2023-11-15
Payer: MEDICARE

## 2023-11-15 VITALS — BODY MASS INDEX: 26.05 KG/M2 | WEIGHT: 164 LBS | HEIGHT: 66.5 IN

## 2023-11-15 VITALS
OXYGEN SATURATION: 98 % | SYSTOLIC BLOOD PRESSURE: 139 MMHG | DIASTOLIC BLOOD PRESSURE: 74 MMHG | HEIGHT: 66 IN | RESPIRATION RATE: 20 BRPM | BODY MASS INDEX: 26.03 KG/M2 | HEART RATE: 59 BPM | WEIGHT: 162 LBS

## 2023-11-15 DIAGNOSIS — M17.12 PRIMARY OSTEOARTHRITIS OF LEFT KNEE: Primary | ICD-10-CM

## 2023-11-15 DIAGNOSIS — Z86.711 HISTORY OF PULMONARY EMBOLUS (PE): ICD-10-CM

## 2023-11-15 DIAGNOSIS — R73.03 PRE-DIABETES: ICD-10-CM

## 2023-11-15 DIAGNOSIS — D68.52 PROTHROMBIN GENE MUTATION (HCC): ICD-10-CM

## 2023-11-15 DIAGNOSIS — I10 BENIGN ESSENTIAL HTN: ICD-10-CM

## 2023-11-15 DIAGNOSIS — Z00.00 ENCOUNTER FOR ANNUAL HEALTH EXAMINATION: Primary | ICD-10-CM

## 2023-11-15 DIAGNOSIS — Z11.59 NEED FOR HEPATITIS C SCREENING TEST: ICD-10-CM

## 2023-11-15 DIAGNOSIS — Z79.01 CHRONIC ANTICOAGULATION: ICD-10-CM

## 2023-11-15 DIAGNOSIS — R20.0 NUMBNESS OF FEET: ICD-10-CM

## 2023-11-15 PROCEDURE — 20610 DRAIN/INJ JOINT/BURSA W/O US: CPT | Performed by: ORTHOPAEDIC SURGERY

## 2023-11-15 RX ORDER — TRIAMCINOLONE ACETONIDE 40 MG/ML
40 INJECTION, SUSPENSION INTRA-ARTICULAR; INTRAMUSCULAR ONCE
Status: COMPLETED | OUTPATIENT
Start: 2023-11-15 | End: 2023-11-15

## 2023-11-15 RX ADMIN — TRIAMCINOLONE ACETONIDE 40 MG: 40 INJECTION, SUSPENSION INTRA-ARTICULAR; INTRAMUSCULAR at 15:02:00

## 2023-11-15 NOTE — PATIENT INSTRUCTIONS
[facility] SCREENING SCHEDULE   Tests on this list are recommended by your physician but may not be covered, or covered at this frequency, by your insurer. Please check with your insurance carrier before scheduling to verify coverage. PREVENTATIVE SERVICES FREQUENCY &  COVERAGE DETAILS LAST COMPLETION DATE   Diabetes Screening    Fasting Blood Sugar /  Glucose    One screening every 12 months if never tested or if previously tested but not diagnosed with pre-diabetes   One screening every 6 months if diagnosed with pre-diabetes Lab Results   Component Value Date    GLU 84 08/16/2023        Cardiovascular Disease Screening    Lipid Panel  Cholesterol  Lipoprotein (HDL)  Triglycerides Covered every 5 years for all Medicare beneficiaries without apparent signs or symptoms of cardiovascular disease Lab Results   Component Value Date    CHOLEST 211 (H) 11/10/2023    HDL 72 (H) 11/10/2023     (H) 11/10/2023    TRIG 109 11/10/2023         Electrocardiogram (EKG)   Covered if needed at [place] to Medicare, and non-screening if indicated for medical reasons 10/03/2019      Ultrasound Screening for Abdominal Aortic Aneurysm (AAA) Covered once in a lifetime for one of the following risk factors    Men who are 73-68 years old and have ever smoked    Anyone with a family history -     Colorectal Cancer Screening  Covered for ages 52-80; only need ONE of the following:    Colonoscopy   Covered every 10 years    Covered every 2 years if patient is at high risk or previous colonoscopy was abnormal 11/11/2021    Health Maintenance   Topic Date Due    Colorectal Cancer Screening  Discontinued       Flexible Sigmoidoscopy   Covered every 4 years -    Fecal Occult Blood Test Covered annually -   Bone Density Screening    Bone density screening    Covered every 2 years after age 72 if diagnosed with risk of osteoporosis or estrogen deficiency.     Covered yearly for long-term glucocorticoid medication use (Steroids) Last Dexa Scan:    XR DEXA BONE DENSITOMETRY (CPT=77080) 11/01/2022      No recommendations at this time   Pap and Pelvic    Pap   Covered every 2 years for women at normal risk;  Annually if at high risk -  No recommendations at this time    Chlamydia Annually if high risk -  No recommendations at this time   Screening Mammogram    Mammogram     Recommend annually for all female patients aged 36 and older    One baseline mammogram covered for patients aged 32-38 10/17/2023    Health Maintenance   Topic Date Due    Mammogram  Discontinued       Immunizations    Influenza Covered once per flu season  Please get every year -  No recommendations at this time    Pneumococcal Each vaccine (Ntlnbky50 & Gquoxqdir81) covered once after 65 Prevnar 13: 06/19/2016    Ltzwdioir02: 06/29/2017     No recommendations at this time    Hepatitis B One screening covered for patients with certain risk factors   -  No recommendations at this time    Tetanus Toxoid Not covered by Medicare Part B unless medically necessary (cut with metal); may be covered with your pharmacy prescription benefits -    Tetanus, Diptheria and Pertusis TD and TDaP Not covered by Medicare Part B -  No recommendations at this time    Zoster Not covered by Medicare Part B; may be covered with your pharmacy  prescription benefits -  No recommendations at this time        To schedule nerve test (EMG):  Please call [phone] and select Option #1 to schedule your EMG test.

## 2023-11-15 NOTE — PROCEDURES
Ms. Chas Hilton reports relief in her knee symptoms up until recently. It is still feeling better, but has felt a little bit more stiff and swollen and some discomfort with going downstairs. She would like to repeat the injection. Risks and benefits of knee injection discussed with the patient, with risks including but not limited to pain and swelling at the injection site and/or within the knee joint, infection, elevation in blood pressure and/or glucose levels, facial flushing. After informed consent, the patient's left knee was marked, locally anesthetized with skin refrigerant, prepped with topical antiseptic, and injected with a mixture of 1mL 40mg/mL Kenalog, 2mL 1% lidocaine and 2mL 0.5% marcaine through the inferolateral portal.  A band-aid was applied. The patient tolerated the procedure well.     Jeremie Gee MD, [address] [facility]  Phone [phone]  Fax [phone]

## 2023-11-16 ENCOUNTER — PATIENT MESSAGE (OUTPATIENT)
Dept: INTERNAL MEDICINE CLINIC | Facility: CLINIC | Age: 78
End: 2023-11-16

## 2023-11-17 NOTE — TELEPHONE ENCOUNTER
From: Chang Herman  To: Darlene Paredes  Sent: 11/16/2023 5:29 PM CST  Subject: Today's eye exam @ Dr. Kyle Concepcion Comp:  I went to my yearly eye exam today and I have a diagnosis of -- Hypertensive Retinopathy in the right eye. The doctor Willy Bryant advised me to see an Ophthalmologist. I've made an appointment at the [facility] for Saturday, November 18th. Doctor Angel Casanova from [facility] advised me to tell my PCP. I will also advise Dr. Padilla Langford. By the way, thank you for the very intensive exam yesterday.     Samantha Rodriguez

## 2023-11-21 DIAGNOSIS — I10 BENIGN ESSENTIAL HTN: ICD-10-CM

## 2023-11-21 RX ORDER — AMLODIPINE BESYLATE 5 MG/1
TABLET ORAL
Qty: 90 TABLET | Refills: 3 | Status: SHIPPED | OUTPATIENT
Start: 2023-11-21

## 2023-11-21 RX ORDER — MONTELUKAST SODIUM 10 MG/1
TABLET ORAL
Qty: 90 TABLET | Refills: 3 | Status: SHIPPED | OUTPATIENT
Start: 2023-11-21

## 2023-11-21 RX ORDER — METOPROLOL TARTRATE 50 MG/1
TABLET, FILM COATED ORAL
Qty: 90 TABLET | Refills: 3 | Status: SHIPPED | OUTPATIENT
Start: 2023-11-21

## 2023-11-21 NOTE — TELEPHONE ENCOUNTER
Last VISIT - 11/15/23 Wellness visit     Last CPE - 11/15/23     Last REFILL -     MONTELUKAST 10 MG Oral Tab 90 tablet 3 12/14/2022     Last LABS - 11/10/23 tsh, lipid, A1c, 8/16/23 cmp, cbc    No future appointments. Per PROTOCOL? Failed     Please Approve or Deny.

## 2023-12-06 ENCOUNTER — PATIENT MESSAGE (OUTPATIENT)
Dept: INTERNAL MEDICINE CLINIC | Facility: CLINIC | Age: 78
End: 2023-12-06

## 2023-12-06 NOTE — TELEPHONE ENCOUNTER
From: Estee Toth  To: Fei Rollins  Sent: 12/6/2023 7:14 AM CST  Subject: Blood Pressure and heart rate monitor    Dear Mrs. Levin:  As requested by you after my last exam, the following is a list of my rates over a period of a few weeks:  Date Blood Pressure Number[s] Heart Rate  11-22 134/78 53  11-23 123/74 48  11-26 123/66 62  11-27 133/76 61  11-28 120/78 51  11-29 121/75  52  12-2 137/82 51  12-4 132/76 51  12-5 126/77 58   Thank you,      Ousmane Alvarado

## 2023-12-27 DIAGNOSIS — I26.09 OTHER PULMONARY EMBOLISM WITH ACUTE COR PULMONALE, UNSPECIFIED CHRONICITY (HCC): ICD-10-CM

## 2024-01-29 ENCOUNTER — ORDER TRANSCRIPTION (OUTPATIENT)
Dept: ADMINISTRATIVE | Facility: HOSPITAL | Age: 79
End: 2024-01-29

## 2024-01-29 ENCOUNTER — OFFICE VISIT (OUTPATIENT)
Dept: NEUROLOGY | Facility: CLINIC | Age: 79
End: 2024-01-29
Payer: MEDICARE

## 2024-01-29 VITALS
WEIGHT: 155 LBS | RESPIRATION RATE: 16 BRPM | BODY MASS INDEX: 25 KG/M2 | HEART RATE: 55 BPM | SYSTOLIC BLOOD PRESSURE: 122 MMHG | OXYGEN SATURATION: 97 % | DIASTOLIC BLOOD PRESSURE: 62 MMHG

## 2024-01-29 DIAGNOSIS — G62.9 PERIPHERAL POLYNEUROPATHY: Primary | ICD-10-CM

## 2024-01-29 DIAGNOSIS — G62.9 NEUROPATHY, PERIPHERAL: Primary | ICD-10-CM

## 2024-01-29 PROCEDURE — 99213 OFFICE O/P EST LOW 20 MIN: CPT | Performed by: OTHER

## 2024-01-29 NOTE — PATIENT INSTRUCTIONS
Refill policies:    Allow 2-3 business days for refills; controlled substances may take longer.  Contact your pharmacy at least 5 days prior to running out of medication and have them send an electronic request or submit request through the “request refill” option in your [employer] account.  Refills are not addressed on weekends; covering physicians do not authorize routine medications on weekends.  No narcotics or controlled substances are refilled after noon on Fridays or by on call physicians.  By law, narcotics must be electronically prescribed.  A 30 day supply with no refills is the maximum allowed.  If your prescription is due for a refill, you may be due for a follow up appointment.  To best provide you care, patients receiving routine medications need to be seen at least once a year.  Patients receiving narcotic/controlled substance medications need to be seen at least once every 3 months.  In the event that your preferred pharmacy does not have the requested medication in stock (e.g. Backordered), it is your responsibility to find another pharmacy that has the requested medication available.  We will gladly send a new prescription to that pharmacy at your request.    Scheduling Tests:    If your physician has ordered radiology tests such as MRI or CT scans, please contact Central Scheduling at [phone] right away to schedule the test.  Once scheduled, the [facility] Centralized Referral Team will work with your insurance carrier to obtain pre-certification or prior authorization.  Depending on your insurance carrier, approval may take 3-10 days.  It is highly recommended patients assure they have received an authorization before having a test performed.  If test is done without insurance authorization, patient may be responsible for the entire amount billed.      Precertification and Prior Authorizations:  If your physician has recommended that you have a procedure or additional testing performed the [facility]  Centralized Referral Team will contact your insurance carrier to obtain pre-certification or prior authorization.    You are strongly encouraged to contact your insurance carrier to verify that your procedure/test has been approved and is a COVERED benefit.  Although the [facility] Centralized Referral Team does its due diligence, the insurance carrier gives the disclaimer that \"Although the procedure is authorized, this does not guarantee payment.\"    Ultimately the patient is responsible for payment.   Thank you for your understanding in this matter.  Paperwork Completion:  If you require FMLA or disability paperwork for your recovery, please make sure to either drop it off or have it faxed to our office at [phone]. Be sure the form has your name and date of birth on it.  The form will be faxed to our Forms Department and they will complete it for you.  There is a 25$ fee for all forms that need to be filled out.  Please be aware there is a 10-14 day turnaround time.  You will need to sign a release of information (PRINCE) form if your paperwork does not come with one.  You may call the Forms Department with any questions at [phone].  Their fax number is [phone].

## 2024-01-29 NOTE — PROGRESS NOTES
HPI:    Patient ID: Carlota Martinez is a 78 year old female.    Neurologic Problem  The patient's primary symptoms include weakness.     Patient is a 78 year old female who presents for follow up for peripheral neuropathy  States no change in her symptoms.  Had labs done and found to be prediabetic  Rest of labs fine. EMG and NCS didn't completed.      Carlota Martinez is a 78 year old female with history of  PE and DVT due to clotting disorder who presents for evaluation of numbness of both feet that has been going on several years and progressively getting worse. She states has noticed some footing issues but no significant balance problem. She denies any tingling and numbness in hands. No history of diabetes or prediabetes. History of prothrombin gene mutation and had DVT and PE in 2016 and since then on Eliquis.      HISTORY:  Past Medical History:   Diagnosis Date    Blood disorder     DVT, bilateral lower limbs (HCC) 3/12/16    Essential hypertension     History of DVT of lower extremity 03/12/2016    Pulmonary embolism (HCC)     Pulmonary embolism with acute cor pulmonale (HCC) 3/12/16    Visual impairment     contacts and glasses      Past Surgical History:   Procedure Laterality Date    COLONOSCOPY      COLONOSCOPY N/A 11/11/2021    Procedure: COLONOSCOPY with biopsy and forcep polypectomy;  Surgeon: Benjamín Diehl MD;  Location:  ENDOSCOPY    OTHER SURGICAL HISTORY  3/12/16    catheters inserted into lungs      Family History   Problem Relation Age of Onset    Cancer Father 90        prostate     Heart Disorder Father     High Blood Pressure Father     Other (Other) Father     Cancer Mother         lung, was a smoker    Heart Disorder Mother     High Blood Pressure Mother     Other (Other) Sister     Asthma Paternal Grandfather     Cancer Maternal Grandfather     Heart Disorder Maternal Grandmother     Diabetes Sister     Fibromyalgia Sister     Breast Cancer Maternal Cousin Female 60    Clotting  Disorder Neg       Social History     Socioeconomic History    Marital status:    Tobacco Use    Smoking status: Never    Smokeless tobacco: Never   Vaping Use    Vaping Use: Never used   Substance and Sexual Activity    Alcohol use: Not Currently     Alcohol/week: 0.0 standard drinks of alcohol    Drug use: No    Sexual activity: Yes     Partners: Male   Other Topics Concern    Caffeine Concern Yes     Comment: coffee    Exercise No   Social History Narrative    . Her   of lung cancer, he was a smoker. She has 1 child and 2 grandchildren. Carlota worked in [employer], retired 2019.         Review of Systems   Constitutional: Negative.    HENT: Negative.     Eyes: Negative.    Respiratory: Negative.     Cardiovascular: Negative.    Gastrointestinal: Negative.    Endocrine: Negative.    Genitourinary: Negative.    Musculoskeletal: Negative.    Skin: Negative.    Allergic/Immunologic: Negative.    Neurological:  Positive for weakness and numbness.   Hematological: Negative.    Psychiatric/Behavioral: Negative.     All other systems reviewed and are negative.           Current Outpatient Medications   Medication Sig Dispense Refill    apixaban (ELIQUIS) 2.5 MG Oral Tab Take 1 tablet (2.5 mg total) by mouth 2 (two) times daily. 180 tablet 3    METOPROLOL TARTRATE 50 MG Oral Tab TAKE ONE-HALF TABLET BY MOUTH  TWICE DAILY 90 tablet 3    MONTELUKAST 10 MG Oral Tab TAKE 1 TABLET BY MOUTH DAILY AS  NEEDED 90 tablet 3    AMLODIPINE 5 MG Oral Tab TAKE 1 TABLET BY MOUTH DAILY 90 tablet 3    Neomycin-Polymyxin-HC 1 % Otic Solution 4 drops to affected ear TID for 10 days, may repeat course as directed 90 mL 0    Biotin w/ Vitamins C & E (HAIR/SKIN/NAILS OR) Take by mouth daily.      Vitamin D3, Cholecalciferol, 1000 UNITS Oral Cap Take 1 capsule (1,000 Units total) by mouth 2 (two) times daily.      Multiple Vitamins-Minerals (SENTRY SENIOR) Oral Tab Take by mouth daily.        OCUVITE Oral Tab Take 1 tablet by  mouth daily as needed.       Allergies:  Allergies   Allergen Reactions    Other UNKNOWN     Hay fever     PHYSICAL EXAM:   Physical Exam    Blood pressure 122/62, pulse 55, resp. rate 16, weight 155 lb (70.3 kg), SpO2 97%, not currently breastfeeding.    General Appearance: Well nourished, well developed, no apparent distress.   HEENT: Normocephalic and atraumatic.   Cardiovascular: Normal rate, regular rhythm and normal heart sounds.    Pulmonary/Chest: Effort normal and breath sounds normal.   Abdominal: Soft. Bowel sounds are normal.   Skin: dry, clean and intact  Ext: peripheral pulses present  Psych: normal mood and affect    Neurological:  Patient is awake, alert and oriented to person, place and time   Normal memory, attention/concentration, speech and language.    Cranial Nerves:   II: Visual fields: normal  III: Pupils: equal, round, reactive to light  III,IV,VI: Extra Ocular Movements: intact  V: Facial sensation: intact  VII: Facial strength: intact  VIII: Hearing: intact  IX: Palate: intact  XI: Shoulder shrug: intact  XII: Tongue movement: normal    Motor: Strength is  5 out of 5 in all extremities bilaterally. + Hammer toes  DTR: 2+ triceps, 1+biceps, 3+ patellar. Absent achilles bilaterally    Sensory: Sensory examination is normal to light touch and pinprick except slightly decrease sensation to pinprick in toes. Moderate to severe vibration loss in feet. Position sense intact    Coordination: Finger-to-nose test normal bilaterally without evidence of dysmetria.    Gait: normal casual gait      TESTS/IMAGING:        Component      Latest Ref Rng 10/16/2023   Vitamin B12      193 - 986 pg/mL 487          Component      Latest Ref Rng 10/16/2023   Expanded TESAFYE Antibody Screen, IGG      <0.7 ug/l 0.20    Anti-dsDNA antibody      <10 IU/mL 0.6    Connective Tissue Disease Screen Interpretation      Negative  Negative        Component      Latest Ref Rng 10/16/2023   PROTEIN, TOTAL      6.4 - 8.2 g/dL  7.6    Albumin      3.75 - 5.21 g/dL 4.42    ALPHA-1-GLOBULINS      0.19 - 0.46 g/dL 0.27    ALPHA-2-GLOBULINS      0.48 - 1.05 g/dL 0.64    BETA GLOBULINS      0.68 - 1.23 g/dL 0.99    GAMMA GLOBULINS      0.62 - 1.70 g/dL 1.28    ALBUMIN/GLOBULIN RATIO      1.00 - 2.00  1.39    SPE INTERPRETATION No apparent monoclonal protein on serum electrophoresis.…    IMMUNOFIXATION No monoclonal protein detected by immunofixation.…    KAPPA FREE LIGHT CHAIN      0.330 - 1.940 mg/dL 1.902    LAMBDA FREE LIGHT CHAIN      0.571 - 2.630 mg/dL 2.275    KAPPA/LAMBDA FLC RATIO      0.26 - 1.65  0.84            ASSESSMENT/PLAN:       ICD-10-CM    1. Peripheral polyneuropathy  G62.9           Peripheral neuropathy of unclear   Etiology ?Idiopathic vs hereditary given hammer toes and pes cavus    Neuropathic labs - prediabetes    Plan: EMG and NCS BLE- pending      Advise proper foot wear and balance exercises    Follow up after the tests are completed  See orders and medications filed with this encounter. The patient indicates understanding of these issues and agrees with the plan.    Anette Russo MD  [facility] [place]      Meds This Visit:  Requested Prescriptions      No prescriptions requested or ordered in this encounter       Imaging & Referrals:  None     ID#[id number]

## 2024-02-12 ENCOUNTER — OFFICE VISIT (OUTPATIENT)
Dept: HEMATOLOGY/ONCOLOGY | Facility: HOSPITAL | Age: 79
End: 2024-02-12
Attending: INTERNAL MEDICINE
Payer: MEDICARE

## 2024-02-12 VITALS
BODY MASS INDEX: 26 KG/M2 | DIASTOLIC BLOOD PRESSURE: 80 MMHG | TEMPERATURE: 98 F | SYSTOLIC BLOOD PRESSURE: 160 MMHG | RESPIRATION RATE: 18 BRPM | WEIGHT: 162 LBS | HEART RATE: 64 BPM | OXYGEN SATURATION: 97 %

## 2024-02-12 DIAGNOSIS — D68.52 PROTHROMBIN GENE MUTATION (HCC): ICD-10-CM

## 2024-02-12 DIAGNOSIS — Z79.01 CHRONIC ANTICOAGULATION: Primary | ICD-10-CM

## 2024-02-12 DIAGNOSIS — Z71.89 ENCOUNTER FOR ANTICOAGULATION DISCUSSION AND COUNSELING: ICD-10-CM

## 2024-02-12 DIAGNOSIS — I26.09 OTHER PULMONARY EMBOLISM WITH ACUTE COR PULMONALE, UNSPECIFIED CHRONICITY (HCC): ICD-10-CM

## 2024-02-12 DIAGNOSIS — Z86.711 HISTORY OF PULMONARY EMBOLUS (PE): ICD-10-CM

## 2024-02-12 LAB
ALBUMIN SERPL-MCNC: 3.8 G/DL (ref 3.4–5)
ALBUMIN/GLOB SERPL: 1 {RATIO} (ref 1–2)
ALP LIVER SERPL-CCNC: 59 U/L
ALT SERPL-CCNC: 21 U/L
ANION GAP SERPL CALC-SCNC: 5 MMOL/L (ref 0–18)
AST SERPL-CCNC: 16 U/L (ref 15–37)
BASOPHILS # BLD AUTO: 0.09 X10(3) UL (ref 0–0.2)
BASOPHILS NFR BLD AUTO: 1 %
BILIRUB SERPL-MCNC: 0.4 MG/DL (ref 0.1–2)
BUN BLD-MCNC: 10 MG/DL (ref 9–23)
CALCIUM BLD-MCNC: 9.3 MG/DL (ref 8.5–10.1)
CHLORIDE SERPL-SCNC: 102 MMOL/L (ref 98–112)
CO2 SERPL-SCNC: 27 MMOL/L (ref 21–32)
CREAT BLD-MCNC: 0.67 MG/DL
EGFRCR SERPLBLD CKD-EPI 2021: 90 ML/MIN/1.73M2 (ref 60–?)
EOSINOPHIL # BLD AUTO: 0.11 X10(3) UL (ref 0–0.7)
EOSINOPHIL NFR BLD AUTO: 1.2 %
ERYTHROCYTE [DISTWIDTH] IN BLOOD BY AUTOMATED COUNT: 12.6 %
GLOBULIN PLAS-MCNC: 3.9 G/DL (ref 2.8–4.4)
GLUCOSE BLD-MCNC: 83 MG/DL (ref 70–99)
HCT VFR BLD AUTO: 40.1 %
HGB BLD-MCNC: 13.4 G/DL
IMM GRANULOCYTES # BLD AUTO: 0.04 X10(3) UL (ref 0–1)
IMM GRANULOCYTES NFR BLD: 0.4 %
LYMPHOCYTES # BLD AUTO: 3.21 X10(3) UL (ref 1–4)
LYMPHOCYTES NFR BLD AUTO: 35.1 %
MCH RBC QN AUTO: 31.5 PG (ref 26–34)
MCHC RBC AUTO-ENTMCNC: 33.4 G/DL (ref 31–37)
MCV RBC AUTO: 94.1 FL
MONOCYTES # BLD AUTO: 0.92 X10(3) UL (ref 0.1–1)
MONOCYTES NFR BLD AUTO: 10.1 %
NEUTROPHILS # BLD AUTO: 4.78 X10 (3) UL (ref 1.5–7.7)
NEUTROPHILS # BLD AUTO: 4.78 X10(3) UL (ref 1.5–7.7)
NEUTROPHILS NFR BLD AUTO: 52.2 %
OSMOLALITY SERPL CALC.SUM OF ELEC: 276 MOSM/KG (ref 275–295)
PLATELET # BLD AUTO: 272 10(3)UL (ref 150–450)
POTASSIUM SERPL-SCNC: 3.7 MMOL/L (ref 3.5–5.1)
PROT SERPL-MCNC: 7.7 G/DL (ref 6.4–8.2)
RBC # BLD AUTO: 4.26 X10(6)UL
SODIUM SERPL-SCNC: 134 MMOL/L (ref 136–145)
WBC # BLD AUTO: 9.2 X10(3) UL (ref 4–11)

## 2024-02-12 PROCEDURE — G2211 COMPLEX E/M VISIT ADD ON: HCPCS | Performed by: INTERNAL MEDICINE

## 2024-02-12 PROCEDURE — 99214 OFFICE O/P EST MOD 30 MIN: CPT | Performed by: INTERNAL MEDICINE

## 2024-02-12 NOTE — PROGRESS NOTES
Education Record     Learner:  Patient     Disease / Diagnosis: DVT/PE     Barriers / Limitations:  None                Comments:     Method:  Brief focused                Comments:     General Topics:  Plan of care reviewed                Comments:     Outcome:  Shows understanding                Comments:pt here for 6 month f/up. Pt states she is taking eliquis as instructed without complications.

## 2024-02-12 NOTE — PROGRESS NOTES
Hematology Clinic Follow Up Visit    Patient Name: Carlota Martinez  Medical Record Number: [id number]   YOB: 1945    PCP: Dr. Elodia Brock  Other providers:  Dr. Sahu (cardiology/vascular)    Reason for Consultation:  Carlota Martinez was seen today for the diagnosis of PE and DVT    Hematologic History:  *Unprovoked Acute massive PE and BLE DVT  -3/12/16- presented to ED with dyspnea, hypotension, tachycardia.  CTA showed massive bilateral proximal PEs (but not saddle), BLE venous doppler showed bilateral distal DVTs.  TTE showed LVEF of 55-60%, increased size of RV with elevated RVSP.  Underwent CDT to pulmonary arteries (Dr. Sahu) then transitioned to UFH gtt   -3/15/16- transitioned to eliquis    =============================  Interval events:  Presents for follow up.  She remains on eliquis 2.5mg BID without any bleeding or excessive bruising.  No dyspnea, chest pain, or LE edema.  She denies any leg pains other than some chronic knee pain.  She reports feeling well.  No upcoming procedures planned.    Past Medical History:  Past Medical History:   Diagnosis Date    Blood disorder     DVT, bilateral lower limbs (HCC) 3/12/16    Essential hypertension     History of DVT of lower extremity 03/12/2016    Pulmonary embolism (HCC)     Pulmonary embolism with acute cor pulmonale (HCC) 3/12/16    Visual impairment     contacts and glasses     Past Surgical History:   Procedure Laterality Date    COLONOSCOPY      COLONOSCOPY N/A 11/11/2021    Procedure: COLONOSCOPY with biopsy and forcep polypectomy;  Surgeon: Benjamín Diehl MD;  Location:  ENDOSCOPY    OTHER SURGICAL HISTORY  3/12/16    catheters inserted into lungs     Home Medications:   apixaban (ELIQUIS) 2.5 MG Oral Tab Take 1 tablet (2.5 mg total) by mouth 2 (two) times daily. 180 tablet 3    METOPROLOL TARTRATE 50 MG Oral Tab TAKE ONE-HALF TABLET BY MOUTH  TWICE DAILY 90 tablet 3    MONTELUKAST 10 MG Oral Tab TAKE 1  TABLET BY MOUTH DAILY AS  NEEDED 90 tablet 3    AMLODIPINE 5 MG Oral Tab TAKE 1 TABLET BY MOUTH DAILY 90 tablet 3    Neomycin-Polymyxin-HC 1 % Otic Solution 4 drops to affected ear TID for 10 days, may repeat course as directed 90 mL 0    Biotin w/ Vitamins C & E (HAIR/SKIN/NAILS OR) Take by mouth daily.      Vitamin D3, Cholecalciferol, 1000 UNITS Oral Cap Take 1 capsule (1,000 Units total) by mouth 2 (two) times daily.      Multiple Vitamins-Minerals (SENTRY SENIOR) Oral Tab Take by mouth daily.        OCUVITE Oral Tab Take 1 tablet by mouth daily as needed.       Allergies:   Allergies   Allergen Reactions    Other UNKNOWN     Hay fever       Psychosocial History:  Social History     Social History Narrative    . Her   of lung cancer, he was a smoker. She has 1 child and 2 grandchildren. Carlota worked in [employer], retired 2019.      Social History     Socioeconomic History    Marital status:    Tobacco Use    Smoking status: Never    Smokeless tobacco: Never   Vaping Use    Vaping Use: Never used   Substance and Sexual Activity    Alcohol use: Not Currently     Alcohol/week: 0.0 standard drinks of alcohol    Drug use: No    Sexual activity: Yes     Partners: Male   Other Topics Concern    Caffeine Concern Yes     Comment: coffee    Exercise No   Social History Narrative    . Her   of lung cancer, he was a smoker. She has 1 child and 2 grandchildren. Carlota worked in [employer], retired 2019.      Family Medical History:  Family History   Problem Relation Age of Onset    Cancer Father 90        prostate     Heart Disorder Father     High Blood Pressure Father     Other (Other) Father     Cancer Mother         lung, was a smoker    Heart Disorder Mother     High Blood Pressure Mother     Other (Other) Sister     Asthma Paternal Grandfather     Cancer Maternal Grandfather     Heart Disorder Maternal Grandmother     Diabetes Sister     Fibromyalgia Sister     Breast Cancer Maternal  Cousin Female 60    Clotting Disorder Neg        Review of Systems:  A 10-point ROS was done with pertinent positives and negative per the HPI    Vital Signs:  Height: --  Weight: 73.5 kg (162 lb) (02/12 1012)  BSA (Calculated - sq m): --  Pulse: 64 (02/12 1012)  BP: 160/80 (02/12 1012)  Temp: 98.1 °F (36.7 °C) (02/12 1012)  Do Not Use - Resp Rate: --  SpO2: 97 % (02/12 1012)    Wt Readings from Last 6 Encounters:   02/12/24 73.5 kg (162 lb)   01/29/24 70.3 kg (155 lb)   11/15/23 74.4 kg (164 lb)   11/15/23 73.5 kg (162 lb)   10/16/23 73.5 kg (162 lb)   08/21/23 73 kg (161 lb)     Physical Examination:  General: Patient is alert and oriented, not in acute distress  Psych: Mood and affect are appropriate  Eyes: EOMI  ENT: Oropharynx is clear  CV: Regular rate and rhythm, no murmurs  Respiratory: Lungs clear to auscultation bilaterally  GI/Abd: Soft, non-tender with normoactive bowel sounds, no hepatosplenomegaly  Neurological: Grossly intact   Lymphatics: No palpable lymphadenopathy  Skin: no rashes or petechiae  Ext: no LE edema or calf tenderness    Laboratory:  Lab Results   Component Value Date    WBC 9.2 02/12/2024    WBC 7.7 08/16/2023    WBC 7.2 02/06/2023    HGB 13.4 02/12/2024    HGB 12.5 08/16/2023    HGB 12.8 02/06/2023    HCT 40.1 02/12/2024    MCV 94.1 02/12/2024    MCH 31.5 02/12/2024    MCHC 33.4 02/12/2024    RDW 12.6 02/12/2024    .0 02/12/2024    .0 08/16/2023    .0 02/06/2023     Lab Results   Component Value Date    GLU 83 02/12/2024    BUN 10 02/12/2024    BUNCREA 13.3 07/21/2021    CREATSERUM 0.67 02/12/2024    CREATSERUM 0.70 08/16/2023    CREATSERUM 0.66 02/06/2023    ANIONGAP 5 02/12/2024    GFR 79 09/21/2017    GFRNAA 78 07/27/2022    GFRAA 90 07/27/2022    CA 9.3 02/12/2024    OSMOCALC 276 02/12/2024    ALKPHO 59 02/12/2024    AST 16 02/12/2024    ALT 21 02/12/2024    BILT 0.4 02/12/2024    TP 7.7 02/12/2024    ALB 3.8 02/12/2024    GLOBULIN 3.9 02/12/2024     (L)  02/12/2024    K 3.7 02/12/2024     02/12/2024    CO2 27.0 02/12/2024     Lab Results   Component Value Date    .4 (H) 03/15/2016    INR 1.35 (H) 03/13/2016     Component      Latest Ref Rng 3/14/2016   STACLOT LUPUS ANTICOAGULANT      Negative Positive (A)   PATHOLOGIST INTERPRETATION       Results indicate the presence of a lupus anticoagulant. Repeat testing in 12 weeks is recommended to rule out transient antibodies that are considered non-pathologic. . . .   DRVVT LUPUS ANTICOAGULANT      Negative Negative   PTT (LUPUS)      25.0-34.0 seconds 276.2 (H)   PTT (Hepzyme)      25-34 seconds 49.0 (H)   STACLOT LA DELTA      <=8.00 seconds 15.30 (H)   DRVVT RATIO      0.00-1.29 1.05   BETA 2 GLYCOPROTEIN 1 AB, IgM      <=15.0 U/mL <3.7   BETA 2 GLYCOPROTEIN 1 AB, IgG      <=15.0 U/mL <3.7   PHOSPHOLIPID AB, IgM      Negative Negative   PHOSPHOLIPID AB, IgG      Negative Negative     Component      Latest Ref Rng 3/30/2016   AT3 ACTIVITY      80.0-120.0 % 105.0   PROTEIN C ACTIVITY       % >150 (H)   FACTOR V LEIDEN (R506Q) MUT      No Mutation No Mutation   PROTHROMBIN [id number] MUTATION      No Mutation Heterozygous (A)     Component      Latest Ref Rng 6/20/2016   Protein S Free, Ag       % 89     Imaging:    As reviewed above    Impression & Plan:     *unprovoked BLE DVT + massive bilateral PE s/p CDT, dx'd 3/2016  -+heterozygous PT mutation, non-confirmed positive APLA test (was drawn prior to starting eliquis, but did require 2 hepzyme treatments as pt was on UFH gtt at the time which may possibly interfere with results).  She prefers to remain on eliquis despite discussing guidelines for warfarin if determined to have APLS (which she is not confirmed to have), therefore repeat APLA testing has not been performed.  She is up to date with age appropriate cancer screening, she lacks as signs/symptoms of an occult malignancy.    -given unprovoked nature of her clot, we have continued chronic  anticoagulation  -tolerating eliquis well without any issues, will continue eliquis 2.5mg BID at this time.     -monitor CBC and CMP q6 months while on anticoagulation    RTC in 6 months    Clint Alcazar MD  Hematology/Medical Oncology  [facility]

## 2024-02-28 ENCOUNTER — OFFICE VISIT (OUTPATIENT)
Dept: ORTHOPEDICS CLINIC | Facility: CLINIC | Age: 79
End: 2024-02-28
Payer: MEDICARE

## 2024-02-28 DIAGNOSIS — M17.12 PRIMARY OSTEOARTHRITIS OF LEFT KNEE: Primary | ICD-10-CM

## 2024-02-28 PROCEDURE — 20610 DRAIN/INJ JOINT/BURSA W/O US: CPT | Performed by: ORTHOPAEDIC SURGERY

## 2024-02-28 RX ORDER — TRIAMCINOLONE ACETONIDE 40 MG/ML
40 INJECTION, SUSPENSION INTRA-ARTICULAR; INTRAMUSCULAR ONCE
Status: COMPLETED | OUTPATIENT
Start: 2024-02-28 | End: 2024-02-28

## 2024-02-28 RX ADMIN — TRIAMCINOLONE ACETONIDE 40 MG: 40 INJECTION, SUSPENSION INTRA-ARTICULAR; INTRAMUSCULAR at 15:05:00

## 2024-02-28 NOTE — PROCEDURES
Doing the [employer] bowling tomorrow.    Risks and benefits of knee injection discussed with the patient, with risks including but not limited to pain and swelling at the injection site and/or within the knee joint, infection, elevation in blood pressure and/or glucose levels, facial flushing.  After informed consent, the patient's left knee was marked, locally anesthetized with skin refrigerant, prepped with topical antiseptic, and injected with a mixture of 1mL 40mg/mL Kenalog, 2mL 1% lidocaine and 2mL 0.5% marcaine through the inferolateral portal.  A band-aid was applied.  The patient tolerated the procedure well.    Garrison Villanueva MD, FAAOS  [facility]  Phone [phone]  Fax [phone]

## 2024-04-09 ENCOUNTER — OFFICE VISIT (OUTPATIENT)
Dept: ELECTROPHYSIOLOGY | Facility: HOSPITAL | Age: 79
End: 2024-04-09
Attending: Other
Payer: MEDICARE

## 2024-04-09 DIAGNOSIS — G62.9 NEUROPATHY, PERIPHERAL: ICD-10-CM

## 2024-04-09 DIAGNOSIS — R20.2 PARESTHESIA OF BOTH FEET: Primary | ICD-10-CM

## 2024-04-09 PROCEDURE — 95886 MUSC TEST DONE W/N TEST COMP: CPT | Performed by: OTHER

## 2024-04-09 PROCEDURE — 95910 NRV CNDJ TEST 7-8 STUDIES: CPT | Performed by: OTHER

## 2024-04-09 NOTE — PROCEDURES
[facility]  Nerve Conduction & EMG Report                      Maranda Cornelius D.O.  [place]   EMG department   [address] [place] [id number]  [phone]          Patient name: Carlota Martinez  YOB: 1945  Referring provider: Dr. Russo  Reason for study: Evaluate for polyneuropathy  Brief history: 78 year old female with several year history of numbness and tingling in the feet.      Sensory and motor nerve conduction studies, and needle EMG:  Please see separate sheet for waveforms and quantitative nerve conduction data, as well as for tabulated form of electromyographic data.      Summary:   1.  The bilateral sural sensory responses were normal.  2.  The right radial sensory response was obtained in order to calculate the sural to radial sensory amplitude ratio, which 0.57, normal.  3.  The bilateral tibial motor responses had normal amplitudes.  4.  The bilateral peroneal motor responses had decreased amplitudes.  Conduction velocities were normal.  5.  EMG using a concentric needle was performed on selected muscles of the bilateral lower extremities and lumbar paraspinal muscles.  There were rare fasciculations seen in the left gastrocnemius, and CRDs seen in the right mid to upper lumbar paraspinal muscles.  There was motor unit remodeling seen in the right tibialis posterior, and left peroneus longus.    Conclusion:  1.  This is an abnormal study.  2.  There is electrophysiologic suggestion of a chronic right L5 and left L5-S1 lumbar radiculopathy.  3.  There is no evidence of a large fiber polyneuropathy on this study.      Maranda Cornelius DO  Neurology and Neuromuscular medicine  [facility]

## 2024-04-10 PROBLEM — R20.2 PARESTHESIA OF BOTH FEET: Status: ACTIVE | Noted: 2024-04-10

## 2024-04-15 ENCOUNTER — LAB ENCOUNTER (OUTPATIENT)
Dept: LAB | Facility: HOSPITAL | Age: 79
End: 2024-04-15
Attending: PHYSICIAN ASSISTANT
Payer: MEDICARE

## 2024-04-15 DIAGNOSIS — R73.03 PRE-DIABETES: ICD-10-CM

## 2024-04-15 LAB
EST. AVERAGE GLUCOSE BLD GHB EST-MCNC: 126 MG/DL (ref 68–126)
HBA1C MFR BLD: 6 % (ref ?–5.7)

## 2024-04-15 PROCEDURE — 36415 COLL VENOUS BLD VENIPUNCTURE: CPT

## 2024-04-15 PROCEDURE — 83036 HEMOGLOBIN GLYCOSYLATED A1C: CPT

## 2024-04-24 ENCOUNTER — OFFICE VISIT (OUTPATIENT)
Dept: NEUROLOGY | Facility: CLINIC | Age: 79
End: 2024-04-24
Payer: MEDICARE

## 2024-04-24 VITALS
BODY MASS INDEX: 26.2 KG/M2 | HEIGHT: 66 IN | SYSTOLIC BLOOD PRESSURE: 136 MMHG | OXYGEN SATURATION: 97 % | RESPIRATION RATE: 14 BRPM | WEIGHT: 163 LBS | DIASTOLIC BLOOD PRESSURE: 76 MMHG | HEART RATE: 54 BPM

## 2024-04-24 DIAGNOSIS — G62.9 SMALL FIBER NEUROPATHY: Primary | ICD-10-CM

## 2024-04-24 DIAGNOSIS — R20.2 NUMBNESS AND TINGLING OF BOTH FEET: ICD-10-CM

## 2024-04-24 DIAGNOSIS — R20.0 NUMBNESS AND TINGLING OF BOTH FEET: ICD-10-CM

## 2024-04-24 PROCEDURE — 99213 OFFICE O/P EST LOW 20 MIN: CPT | Performed by: OTHER

## 2024-04-24 NOTE — PATIENT INSTRUCTIONS
Alpha Lipoic acid and vitamin B12 500 microgram    Skin biopsy                 Refill policies:    Allow 2-3 business days for refills; controlled substances may take longer.  Contact your pharmacy at least 5 days prior to running out of medication and have them send an electronic request or submit request through the “request refill” option in your [employer] account.  Refills are not addressed on weekends; covering physicians do not authorize routine medications on weekends.  No narcotics or controlled substances are refilled after noon on Fridays or by on call physicians.  By law, narcotics must be electronically prescribed.  A 30 day supply with no refills is the maximum allowed.  If your prescription is due for a refill, you may be due for a follow up appointment.  To best provide you care, patients receiving routine medications need to be seen at least once a year.  Patients receiving narcotic/controlled substance medications need to be seen at least once every 3 months.  In the event that your preferred pharmacy does not have the requested medication in stock (e.g. Backordered), it is your responsibility to find another pharmacy that has the requested medication available.  We will gladly send a new prescription to that pharmacy at your request.    Scheduling Tests:    If your physician has ordered radiology tests such as MRI or CT scans, please contact Central Scheduling at [phone] right away to schedule the test.  Once scheduled, the [facility] Centralized Referral Team will work with your insurance carrier to obtain pre-certification or prior authorization.  Depending on your insurance carrier, approval may take 3-10 days.  It is highly recommended patients assure they have received an authorization before having a test performed.  If test is done without insurance authorization, patient may be responsible for the entire amount billed.      Precertification and Prior Authorizations:  If your  physician has recommended that you have a procedure or additional testing performed the [facility] Centralized Referral Team will contact your insurance carrier to obtain pre-certification or prior authorization.    You are strongly encouraged to contact your insurance carrier to verify that your procedure/test has been approved and is a COVERED benefit.  Although the [facility] Centralized Referral Team does its due diligence, the insurance carrier gives the disclaimer that \"Although the procedure is authorized, this does not guarantee payment.\"    Ultimately the patient is responsible for payment.   Thank you for your understanding in this matter.  Paperwork Completion:  If you require FMLA or disability paperwork for your recovery, please make sure to either drop it off or have it faxed to our office at [phone]. Be sure the form has your name and date of birth on it.  The form will be faxed to our Forms Department and they will complete it for you.  There is a 25$ fee for all forms that need to be filled out.  Please be aware there is a 10-14 day turnaround time.  You will need to sign a release of information (PRINCE) form if your paperwork does not come with one.  You may call the Forms Department with any questions at [phone].  Their fax number is [phone].

## 2024-04-24 NOTE — PROGRESS NOTES
HPI:    Patient ID: Carlota Martinez is a 78 year old female.    Neurologic Problem  The patient's pertinent negatives include no weakness.     Patient is a 78 year old female who presents for follow up for peripheral neuropathy  States no change in her symptoms.  Had labs done and found to be prediabetic, following diet and hgBA1c down to 6 from 6.4  EMG and NCS performed didn't show ay evidence of  large fiber peripheral neuropathy      Carlota Martinez is a 78 year old female with history of  PE and DVT due to clotting disorder who presents for evaluation of numbness of both feet that has been going on several years and progressively getting worse. She states has noticed some footing issues but no significant balance problem. She denies any tingling and numbness in hands. No history of diabetes or prediabetes. History of prothrombin gene mutation and had DVT and PE in 2016 and since then on Eliquis.      HISTORY:  Past Medical History:    Blood disorder    DVT, bilateral lower limbs (HCC)    Essential hypertension    History of DVT of lower extremity    Pulmonary embolism (HCC)    Pulmonary embolism with acute cor pulmonale (HCC)    Visual impairment    contacts and glasses      Past Surgical History:   Procedure Laterality Date    Colonoscopy      Colonoscopy N/A 11/11/2021    Procedure: COLONOSCOPY with biopsy and forcep polypectomy;  Surgeon: Benjamín Diehl MD;  Location:  ENDOSCOPY    Other surgical history  3/12/16    catheters inserted into lungs      Family History   Problem Relation Age of Onset    Cancer Father 90        prostate     Heart Disorder Father     High Blood Pressure Father     Other (Other) Father     Cancer Mother         lung, was a smoker    Heart Disorder Mother     High Blood Pressure Mother     Other (Other) Sister     Asthma Paternal Grandfather     Cancer Maternal Grandfather     Heart Disorder Maternal Grandmother     Diabetes Sister     Fibromyalgia Sister     Breast  Cancer Maternal Cousin Female 60    Clotting Disorder Neg       Social History     Socioeconomic History    Marital status:    Tobacco Use    Smoking status: Never    Smokeless tobacco: Never   Vaping Use    Vaping status: Never Used   Substance and Sexual Activity    Alcohol use: Not Currently     Alcohol/week: 0.0 standard drinks of alcohol    Drug use: No    Sexual activity: Yes     Partners: Male   Other Topics Concern    Caffeine Concern Yes     Comment: coffee    Exercise No   Social History Narrative    . Her   of lung cancer, he was a smoker. She has 1 child and 2 grandchildren. Carlota worked in [employer], retired 2019.      Social Determinants of Health      [facility]    Social Connections    Received from [facility]    Housing Stability        Review of Systems   Constitutional: Negative.    HENT: Negative.     Eyes: Negative.    Respiratory: Negative.     Cardiovascular: Negative.    Gastrointestinal: Negative.    Endocrine: Negative.    Genitourinary: Negative.    Musculoskeletal: Negative.    Skin: Negative.    Allergic/Immunologic: Negative.    Neurological:  Positive for numbness. Negative for weakness.   Hematological: Negative.    Psychiatric/Behavioral: Negative.     All other systems reviewed and are negative.           Current Outpatient Medications   Medication Sig Dispense Refill    Glucosamine-Chondroitin (GLUCOSAMINE CHONDR COMPLEX OR) Take by mouth.      apixaban (ELIQUIS) 2.5 MG Oral Tab Take 1 tablet (2.5 mg total) by mouth 2 (two) times daily. 180 tablet 3    METOPROLOL TARTRATE 50 MG Oral Tab TAKE ONE-HALF TABLET BY MOUTH  TWICE DAILY 90 tablet 3    MONTELUKAST 10 MG Oral Tab TAKE 1 TABLET BY MOUTH DAILY AS  NEEDED 90 tablet 3    AMLODIPINE 5 MG Oral Tab TAKE 1 TABLET BY MOUTH DAILY 90 tablet 3    Neomycin-Polymyxin-HC 1 % Otic Solution 4 drops to  affected ear TID for 10 days, may repeat course as directed 90 mL 0    Biotin w/ Vitamins C & E (HAIR/SKIN/NAILS OR) Take by mouth daily.      Vitamin D3, Cholecalciferol, 1000 UNITS Oral Cap Take 1 capsule (1,000 Units total) by mouth 2 (two) times daily.      Multiple Vitamins-Minerals (SENTRY SENIOR) Oral Tab Take by mouth daily.        OCUVITE Oral Tab Take 1 tablet by mouth daily as needed.       Allergies:  Allergies   Allergen Reactions    Other UNKNOWN     Hay fever     PHYSICAL EXAM:   Physical Exam    Blood pressure 136/76, pulse 54, resp. rate 14, height 66\", weight 163 lb (73.9 kg), SpO2 97%, not currently breastfeeding.    General Appearance: Well nourished, well developed, no apparent distress.   HEENT: Normocephalic and atraumatic.   Cardiovascular: Normal rate, regular rhythm and normal heart sounds.    Pulmonary/Chest: Effort normal and breath sounds normal.   Abdominal: Soft. Bowel sounds are normal.   Skin: dry, clean and intact  Ext: peripheral pulses present  Psych: normal mood and affect    Neurological:  Patient is awake, alert and oriented to person, place and time   Normal memory, attention/concentration, speech and language.    Cranial Nerves:   II: Visual fields: normal  III: Pupils: equal, round, reactive to light  III,IV,VI: Extra Ocular Movements: intact  V: Facial sensation: intact  VII: Facial strength: intact  VIII: Hearing: intact  IX: Palate: intact  XI: Shoulder shrug: intact  XII: Tongue movement: normal    Motor: Strength is  5 out of 5 in all extremities bilaterally. + Hammer toes  DTR: 2+ triceps, 1+biceps, 3+ patellar. Absent achilles bilaterally    Sensory: Sensory examination is normal to light touch and pinprick except slightly decrease sensation to pinprick in toes. Moderate to severe vibration loss in feet. Position sense intact    Coordination: Finger-to-nose test normal bilaterally without evidence of dysmetria.    Gait: normal casual gait      TESTS/IMAGING:           Conclusion:  1.  This is an abnormal study.  2.  There is electrophysiologic suggestion of a chronic right L5 and left L5-S1 lumbar radiculopathy.  3.  There is no evidence of a large fiber polyneuropathy on this study.            Component      Latest Ref Rng 10/16/2023   Vitamin B12      193 - 986 pg/mL 487          Component      Latest Ref Rng 10/16/2023   Expanded TESFAYE Antibody Screen, IGG      <0.7 ug/l 0.20    Anti-dsDNA antibody      <10 IU/mL 0.6    Connective Tissue Disease Screen Interpretation      Negative  Negative        Component      Latest Ref Rng 10/16/2023   PROTEIN, TOTAL      6.4 - 8.2 g/dL 7.6    Albumin      3.75 - 5.21 g/dL 4.42    ALPHA-1-GLOBULINS      0.19 - 0.46 g/dL 0.27    ALPHA-2-GLOBULINS      0.48 - 1.05 g/dL 0.64    BETA GLOBULINS      0.68 - 1.23 g/dL 0.99    GAMMA GLOBULINS      0.62 - 1.70 g/dL 1.28    ALBUMIN/GLOBULIN RATIO      1.00 - 2.00  1.39    SPE INTERPRETATION No apparent monoclonal protein on serum electrophoresis.…    IMMUNOFIXATION No monoclonal protein detected by immunofixation.…    KAPPA FREE LIGHT CHAIN      0.330 - 1.940 mg/dL 1.902    LAMBDA FREE LIGHT CHAIN      0.571 - 2.630 mg/dL 2.275    KAPPA/LAMBDA FLC RATIO      0.26 - 1.65  0.84            ASSESSMENT/PLAN:       ICD-10-CM    1. Small fiber neuropathy  G62.9       2. Numbness and tingling of both feet  R20.0     R20.2             Peripheral neuropathy of unclear etiology.  EMG and NCS was negative for any large fiber neuropathy  Small fiber neuropathy is still possible  Has superimposed chronic b/l L5 radiculopathies      Discussed skin nerve biopsy and patient would like to proceed    Continue diet modification for prediabetes    Advise proper foot wear and balance exercises    Will call her back for skin biopsy once PA is completed      See orders and medications filed with this encounter. The patient indicates understanding of these issues and agrees with the plan.    Anette Russo MD  [facility]  [facility] [place]      Meds This Visit:  Requested Prescriptions      No prescriptions requested or ordered in this encounter       Imaging & Referrals:  None     ID#[id number]

## 2024-04-29 RX ORDER — NEOMYCIN SULFATE, POLYMYXIN B SULFATE, HYDROCORTISONE 3.5; 10000; 1 MG/ML; [USP'U]/ML; MG/ML
SOLUTION/ DROPS AURICULAR (OTIC)
Qty: 90 ML | Refills: 0 | OUTPATIENT
Start: 2024-04-29

## 2024-04-29 NOTE — TELEPHONE ENCOUNTER
Requested Prescriptions     Pending Prescriptions Disp Refills    Neomycin-Polymyxin-HC 1 % Otic Solution 90 mL 0     Si drops to affected ear TID for 10 days, may repeat course as directed       LOV: 11--cb-physical    Last Labs:  2024-cmp,cbc    Last Refill:  2022- 90 ml with 0 refills     Your appointments       Date & Time Appointment Department ([place])    May 22, 2024 3:00 PM CDT Follow Up Visit with Garrison Villanueva MD [address] ([facility])    Contact your primary care provider if your insurance requires a referral.    Please arrive 15 minutes prior to your scheduled appointment. Be sure to bring your current Insurance card, Photo ID, and medication bottles or a list of your current medications.      A 24 hour notice is required to cancel any appointment or you may be charged a $40 No Show Fee.     Important: 24 hour notice is required to cancel any appointment or you may be charged a $40 No Show Fee. Please notify your physician office.         2024 11:50 AM CDT Follow Up Visit with Anette Russo MD [facility] ([facility])        Aug 12, 2024 10:30 AM CDT HEMATOLOGY ONCOLOGY FOLLOW UP with Clint Alcazar MD [facility] [place] ([facility])              [facility]  [address]  [facility] [address]  [phone] [address]  [address] [place] [address]  [facility] [id number] [phone] [facility]  [address]  [facility] [id number] [phone]

## 2024-05-01 ENCOUNTER — TELEPHONE (OUTPATIENT)
Dept: NEUROLOGY | Facility: CLINIC | Age: 79
End: 2024-05-01

## 2024-05-01 NOTE — TELEPHONE ENCOUNTER
Anette Russo MD   to April Wiseman RN [facility] [place] Nurse       5/1/24  1:18 PM  Schedule with first available appt. No rush. Thanks      See [employer] message dated 4/30/24    Has been routed to  pool to reach out to pt and schedule.

## 2024-05-01 NOTE — TELEPHONE ENCOUNTER
Per [employer] message, pt states she is to have a Skin Punch Biopsy.     ELANA does NOT complete PA as lab work completed through [facility].  Pt informed via [employer] to contact [employer] for information on cost of procedure and insurance coverage.   Routed to provider to confirm to schedule with ordering provider.  ELANA will need to confirm status of skin punch biopsy kit in office once pt is scheduled.     Provider notes from [facility], 4/24/2024, confirm pt to have skin punch biopsy:    Peripheral neuropathy of unclear etiology.  EMG and NCS was negative for any large fiber neuropathy  Small fiber neuropathy is still possible  Has superimposed chronic b/l L5 radiculopathies        Discussed skin nerve biopsy and patient would like to proceed     Continue diet modification for prediabetes     Advise proper foot wear and balance exercises     Will call her back for skin biopsy once PA is completed

## 2024-05-22 ENCOUNTER — OFFICE VISIT (OUTPATIENT)
Dept: ORTHOPEDICS CLINIC | Facility: CLINIC | Age: 79
End: 2024-05-22

## 2024-05-22 VITALS — HEIGHT: 66.5 IN | BODY MASS INDEX: 25.86 KG/M2 | WEIGHT: 162.81 LBS

## 2024-05-22 DIAGNOSIS — M17.12 PRIMARY OSTEOARTHRITIS OF LEFT KNEE: Primary | ICD-10-CM

## 2024-05-22 PROCEDURE — 20610 DRAIN/INJ JOINT/BURSA W/O US: CPT | Performed by: ORTHOPAEDIC SURGERY

## 2024-05-22 RX ORDER — TRIAMCINOLONE ACETONIDE 40 MG/ML
40 INJECTION, SUSPENSION INTRA-ARTICULAR; INTRAMUSCULAR ONCE
Status: COMPLETED | OUTPATIENT
Start: 2024-05-22 | End: 2024-05-22

## 2024-05-22 RX ADMIN — TRIAMCINOLONE ACETONIDE 40 MG: 40 INJECTION, SUSPENSION INTRA-ARTICULAR; INTRAMUSCULAR at 15:41:00

## 2024-05-22 NOTE — PROCEDURES
Very pleasant 78-year-old female returns for left knee pain.  She states the last injection only helped about 2 months, now over the past month symptoms have been worsening.  She would like to repeat the injection today, but does have questions if there is anything additional she can attempt.    Risks and benefits of knee injection discussed with the patient, with risks including but not limited to pain and swelling at the injection site and/or within the knee joint, infection, elevation in blood pressure and/or glucose levels, facial flushing.  After informed consent, the patient's left knee was marked, locally anesthetized with skin refrigerant, prepped with topical antiseptic, and injected with a mixture of 1mL 40mg/mL Kenalog, 2mL 1% lidocaine and 2mL 0.5% marcaine through the inferolateral portal.  A band-aid was applied.  The patient tolerated the procedure well.    We did discuss possibility of prolonged acting steroid injection with Zil[facility].  She would like to attempt this.  I will place order for approval in 2 months.    Garrison Villanueva MD, [facility]  [facility]  Phone [phone]  Fax [phone]

## 2024-06-03 ENCOUNTER — TELEPHONE (OUTPATIENT)
Dept: INTERNAL MEDICINE CLINIC | Facility: CLINIC | Age: 79
End: 2024-06-03

## 2024-06-03 ENCOUNTER — OFFICE VISIT (OUTPATIENT)
Dept: NEUROLOGY | Facility: CLINIC | Age: 79
End: 2024-06-03
Payer: MEDICARE

## 2024-06-03 ENCOUNTER — OFFICE VISIT (OUTPATIENT)
Dept: INTERNAL MEDICINE CLINIC | Facility: CLINIC | Age: 79
End: 2024-06-03
Payer: MEDICARE

## 2024-06-03 VITALS
TEMPERATURE: 97 F | HEIGHT: 66 IN | WEIGHT: 158.63 LBS | RESPIRATION RATE: 18 BRPM | BODY MASS INDEX: 25.49 KG/M2 | OXYGEN SATURATION: 99 % | SYSTOLIC BLOOD PRESSURE: 114 MMHG | DIASTOLIC BLOOD PRESSURE: 60 MMHG | HEART RATE: 56 BPM

## 2024-06-03 VITALS
HEART RATE: 73 BPM | DIASTOLIC BLOOD PRESSURE: 72 MMHG | WEIGHT: 158.88 LBS | RESPIRATION RATE: 16 BRPM | SYSTOLIC BLOOD PRESSURE: 130 MMHG | BODY MASS INDEX: 25 KG/M2

## 2024-06-03 DIAGNOSIS — J30.89 ENVIRONMENTAL AND SEASONAL ALLERGIES: Primary | ICD-10-CM

## 2024-06-03 DIAGNOSIS — R73.9 BLOOD GLUCOSE ELEVATED: ICD-10-CM

## 2024-06-03 DIAGNOSIS — G62.9 SMALL FIBER NEUROPATHY: Primary | ICD-10-CM

## 2024-06-03 DIAGNOSIS — J30.89 ENVIRONMENTAL AND SEASONAL ALLERGIES: ICD-10-CM

## 2024-06-03 DIAGNOSIS — R20.2 PARESTHESIA OF BOTH FEET: ICD-10-CM

## 2024-06-03 DIAGNOSIS — I10 BENIGN ESSENTIAL HTN: ICD-10-CM

## 2024-06-03 PROCEDURE — 99213 OFFICE O/P EST LOW 20 MIN: CPT | Performed by: INTERNAL MEDICINE

## 2024-06-03 RX ORDER — NEOMYCIN SULFATE, POLYMYXIN B SULFATE, HYDROCORTISONE 3.5; 10000; 1 MG/ML; [USP'U]/ML; MG/ML
SOLUTION/ DROPS AURICULAR (OTIC)
Qty: 90 ML | Refills: 1 | Status: SHIPPED | OUTPATIENT
Start: 2024-06-03 | End: 2024-06-03

## 2024-06-03 RX ORDER — NEOMYCIN SULFATE, POLYMYXIN B SULFATE, HYDROCORTISONE 3.5; 10000; 1 MG/ML; [USP'U]/ML; MG/ML
SOLUTION/ DROPS AURICULAR (OTIC)
Qty: 30 ML | Refills: 1 | Status: SHIPPED | OUTPATIENT
Start: 2024-06-03

## 2024-06-03 RX ORDER — NEOMYCIN SULFATE, POLYMYXIN B SULFATE, HYDROCORTISONE 3.5; 10000; 1 MG/ML; [USP'U]/ML; MG/ML
SOLUTION/ DROPS AURICULAR (OTIC)
Qty: 10 ML | Refills: 1 | Status: SHIPPED | OUTPATIENT
Start: 2024-06-03 | End: 2024-06-03

## 2024-06-03 NOTE — PATIENT INSTRUCTIONS
Refill policies:    Allow 2-3 business days for refills; controlled substances may take longer.  Contact your pharmacy at least 5 days prior to running out of medication and have them send an electronic request or submit request through the “request refill” option in your [employer] account.  Refills are not addressed on weekends; covering physicians do not authorize routine medications on weekends.  No narcotics or controlled substances are refilled after noon on Fridays or by on call physicians.  By law, narcotics must be electronically prescribed.  A 30 day supply with no refills is the maximum allowed.  If your prescription is due for a refill, you may be due for a follow up appointment.  To best provide you care, patients receiving routine medications need to be seen at least once a year.  Patients receiving narcotic/controlled substance medications need to be seen at least once every 3 months.  In the event that your preferred pharmacy does not have the requested medication in stock (e.g. Backordered), it is your responsibility to find another pharmacy that has the requested medication available.  We will gladly send a new prescription to that pharmacy at your request.    Scheduling Tests:    If your physician has ordered radiology tests such as MRI or CT scans, please contact Central Scheduling at [phone] right away to schedule the test.  Once scheduled, the [facility] Centralized Referral Team will work with your insurance carrier to obtain pre-certification or prior authorization.  Depending on your insurance carrier, approval may take 3-10 days.  It is highly recommended patients assure they have received an authorization before having a test performed.  If test is done without insurance authorization, patient may be responsible for the entire amount billed.      Precertification and Prior Authorizations:  If your physician has recommended that you have a procedure or additional testing performed the [facility]  Centralized Referral Team will contact your insurance carrier to obtain pre-certification or prior authorization.    You are strongly encouraged to contact your insurance carrier to verify that your procedure/test has been approved and is a COVERED benefit.  Although the [facility] Centralized Referral Team does its due diligence, the insurance carrier gives the disclaimer that \"Although the procedure is authorized, this does not guarantee payment.\"    Ultimately the patient is responsible for payment.   Thank you for your understanding in this matter.  Paperwork Completion:  If you require FMLA or disability paperwork for your recovery, please make sure to either drop it off or have it faxed to our office at [phone]. Be sure the form has your name and date of birth on it.  The form will be faxed to our Forms Department and they will complete it for you.  There is a 25$ fee for all forms that need to be filled out.  Please be aware there is a 10-14 day turnaround time.  You will need to sign a release of information (PRINCE) form if your paperwork does not come with one.  You may call the Forms Department with any questions at [phone].  Their fax number is [phone].

## 2024-06-03 NOTE — PROGRESS NOTES
RN assisted Dr. Russo with skin punch biopsy procedure.  [facility] order completed and signed by Dr. Russo.  Consent form, financial responsibility form, and PRINCE to [facility] signed by patient and sent to scanning.  Biopsy sites numbed pre-procedure.  See MAR for med admin details.  Skin samples collected per protocol and labeled.  Pt tolerated procedure well.      Pressure dressings applied to biopsy sites after procedure using sterile gauze and paper tape.  Pt remained in sitting position with legs elevated for 10 min post-procedure.  No breakthrough bleeding noted on dressings.    Pt educated to keep sites bandaged for 24 hours, after which she should keep sites clean, dry, and covered with neosporin and bandage.  Continue this for at least 5 days, and do not submerge wounds in water for any length of time (i.e. Bath or hottub) for at least 7 days.  Pt verbalized understanding.    Skin samples packaged per protocol.  [facility] order, pt facesheet, copy of insurance card, last office visit note, and copy of EMG report included.  [employer]PS called for .    Copy of [facility] order sent to scanning.     Spoke with Corrine at [employer] and  scheduled. Conf # [id number].

## 2024-06-03 NOTE — PROCEDURES
Consent obtained after discussing risk vs benefits    Location:  Left foot and right lateral leg    Description: Area prepped and clean with betadine scrub and alcohol in a sterile manner. Anesthesia given with 1.5 ml of 2% lidocaine + epinephrine. Skin punch biopsy performed both LE without any complications. Patient tolerated procedure well. Bandage placed at the site.    Blood loss: 1-2 cc. Hemostasis achieved.    Post procedure instruction:  Keep the site completely dry for the next 24-48 hours, after which a new Band-Aid and antibiotic ointment should be applied to the area. Patient was further instructed to avoid getting the site dirty or infected. The patient was discharged home in stable condition    The biopsy will be sent for analysis. We will follow up with results.      Anette Russo MD  [facility] [place]

## 2024-06-03 NOTE — TELEPHONE ENCOUNTER
Pt seen today by TB 6/3/24       Outpatient Medication Detail     Disp Refills Start End    Neomycin-Polymyxin-HC 1 % Otic Solution 90 mL 1 6/3/2024 --    Si drops to affected ear TID for 10 days, may repeat course as directed    Sent to pharmacy as: Neomycin-Polymyxin-HC 1 % Otic Solution    E-Prescribing Status: Receipt confirmed by pharmacy (6/3/2024  3:01 PM CDT)      Associated Diagnoses    Environmental and seasonal allergies  - Primary        Pharmacy    [facility] - [address] [phone], [phone]       Routing to TB to clarify order quantity and where to send

## 2024-06-03 NOTE — TELEPHONE ENCOUNTER
[facility] calling on quantity of below ear drops. 90ml would be 9 bottles? It also looks like the RX was cancelled to [facility] and sent to [facility]? Where should it go?

## 2024-06-03 NOTE — PROGRESS NOTES
Carlota Martinez is a 78 year old female.    Chief Complaint   Patient presents with    Follow - Up     ES rm - 3 - f/u on medication       HPI:     Patient with HTN, pre dm, environmental allergies here for follow up-  She has intermittent itching of years depending on the weather, uses neomycin- polymyxin otic solution with resolution of symptoms. Refill needed.  BP well controlled on amlodipine and metoprolol, 114/60 today. No cardiac complaints  Pre dm- she is watching diet, will come in for annual in Nov    Patient Active Problem List   Diagnosis    Benign essential HTN    Prothrombin gene mutation (HCC)    Chronic anticoagulation    History of pulmonary embolus (PE)    Numbness of feet    Pre-diabetes    Encounter for anticoagulation discussion and counseling    Paresthesia of both feet     Current Outpatient Medications   Medication Sig Dispense Refill    Neomycin-Polymyxin-HC 1 % Otic Solution 4 drops to affected ear TID for 10 days, may repeat course as directed 90 mL 1    Glucosamine-Chondroitin (GLUCOSAMINE CHONDR COMPLEX OR) Take by mouth.      apixaban (ELIQUIS) 2.5 MG Oral Tab Take 1 tablet (2.5 mg total) by mouth 2 (two) times daily. 180 tablet 3    METOPROLOL TARTRATE 50 MG Oral Tab TAKE ONE-HALF TABLET BY MOUTH  TWICE DAILY 90 tablet 3    MONTELUKAST 10 MG Oral Tab TAKE 1 TABLET BY MOUTH DAILY AS  NEEDED 90 tablet 3    AMLODIPINE 5 MG Oral Tab TAKE 1 TABLET BY MOUTH DAILY 90 tablet 3    Biotin w/ Vitamins C & E (HAIR/SKIN/NAILS OR) Take by mouth daily.      Vitamin D3, Cholecalciferol, 1000 UNITS Oral Cap Take 1 capsule (1,000 Units total) by mouth 2 (two) times daily.      Multiple Vitamins-Minerals (SENTRY SENIOR) Oral Tab Take by mouth daily.        OCUVITE Oral Tab Take 1 tablet by mouth daily as needed.        Past Medical History:    Blood disorder    DVT, bilateral lower limbs (HCC)    Essential hypertension    History of DVT of lower extremity    Pulmonary embolism (HCC)    Pulmonary  embolism with acute cor pulmonale (HCC)    Visual impairment    contacts and glasses      Social History:  Social History     Socioeconomic History    Marital status:    Tobacco Use    Smoking status: Never    Smokeless tobacco: Never    Tobacco comments:     Updated 24   Vaping Use    Vaping status: Never Used   Substance and Sexual Activity    Alcohol use: Not Currently     Alcohol/week: 0.0 standard drinks of alcohol    Drug use: No    Sexual activity: Yes     Partners: Male   Other Topics Concern    Caffeine Concern Yes     Comment: coffee    Exercise No   Social History Narrative    . Her   of lung cancer, he was a smoker. She has 1 child and 2 grandchildren. Carlota worked in [employer], retired 2019.      Social Determinants of Health      Received from [facility], [facility]    Social Connections    Received from [facility], [facility]    Housing Stability     Family History   Problem Relation Age of Onset    Cancer Father 90        prostate     Heart Disorder Father     High Blood Pressure Father     Other (Other) Father     Cancer Mother         lung, was a smoker    Heart Disorder Mother     High Blood Pressure Mother     Other (Other) Sister     Asthma Paternal Grandfather     Cancer Maternal Grandfather     Heart Disorder Maternal Grandmother     Diabetes Sister     Fibromyalgia Sister     Breast Cancer Maternal Cousin Female 60    Clotting Disorder Neg         Allergies  Allergies   Allergen Reactions    Other UNKNOWN     Hay fever         REVIEW OF SYSTEMS:   GENERAL HEALTH:  no fevers   RESPIRATORY: no cough  CARDIOVASCULAR: no CP    EXAM:   /60 (BP Location: Left arm, Patient Position: Sitting, Cuff Size: large)   Pulse 56   Temp 97.4 °F (36.3 °C) (Temporal)   Resp 18   Ht 5' 6\" (1.676 m)   Wt 158 lb 9.6 oz (71.9 kg)   LMP  (LMP Unknown)   SpO2 99%   BMI 25.60 kg/m²   GENERAL: well  developed, well nourished,in no apparent distress  HEENT: atraumatic, normocephalic, Tms wnl, dry flaky skin in both ear canals  LUNGS: normal rate without respiratory distress  NEURO: Alert and oriented    ASSESSMENT AND PLAN:     Encounter Diagnoses   Name     Environmental and seasonal allergies, itchy ears- otc antihistamine, neomycin-polymyxin otic solution prn     Benign essential HTN- controlled, CPM     Paresthesia of both feet- she had biopsy today with neuro     Blood glucose elevated- watch diet, check hgba1c        Orders Placed This Encounter   Procedures    Hemoglobin A1C [E]    TSH W Reflex To Free T4 [E]    Lipid Panel [E]       Meds & Refills for this Visit:  Requested Prescriptions     Signed Prescriptions Disp Refills    Neomycin-Polymyxin-HC 1 % Otic Solution 90 mL 1     Si drops to affected ear TID for 10 days, may repeat course as directed       Imaging & Consults:  None    Return in about 5 months (around 11/3/2024), or if symptoms worsen or fail to improve, for wellness.  There are no Patient Instructions on file for this visit.      The patient indicates understanding of these issues and agrees to the plan.

## 2024-06-03 NOTE — TELEPHONE ENCOUNTER
Patient wants mail order so I sent to mail order with 30ml with refill. She uses chronically for itching of ears/allergies (not for infection of ear).  Please let me know if any problems

## 2024-06-10 ENCOUNTER — HOSPITAL ENCOUNTER (OUTPATIENT)
Age: 79
Discharge: HOME | End: 2024-06-10
Payer: MEDICARE

## 2024-06-10 VITALS
WEIGHT: 154.63 LBS | TEMPERATURE: 98 F | HEIGHT: 66 IN | RESPIRATION RATE: 19 BRPM | BODY MASS INDEX: 24.85 KG/M2 | OXYGEN SATURATION: 96 % | SYSTOLIC BLOOD PRESSURE: 139 MMHG | HEART RATE: 52 BPM | DIASTOLIC BLOOD PRESSURE: 77 MMHG

## 2024-06-10 DIAGNOSIS — R21 RASH: Primary | ICD-10-CM

## 2024-06-10 PROCEDURE — 99213 OFFICE O/P EST LOW 20 MIN: CPT | Performed by: PHYSICIAN ASSISTANT

## 2024-06-10 RX ORDER — TRIAMCINOLONE ACETONIDE 5 MG/G
1 CREAM TOPICAL 3 TIMES DAILY
Qty: 15 G | Refills: 0 | Status: SHIPPED | OUTPATIENT
Start: 2024-06-10 | End: 2024-06-17

## 2024-06-10 NOTE — ED INITIAL ASSESSMENT (HPI)
Pt was diagnosed with eczema, and then 5 days ago developed a rash starting on her rt arm and is now spreading   it is very itchy

## 2024-06-10 NOTE — TELEPHONE ENCOUNTER
Disp Refills Start End    Neomycin-Polymyxin-HC 1 % Otic Solution 30 mL 1 6/3/2024 --    Si drops to affected ear TID for 10 days, may repeat course as directed    Sent to pharmacy as: Neomycin-Polymyxin-HC 1 % Otic Solution    E-Prescribing Status: Receipt confirmed by pharmacy (6/3/2024  5:47 PM CDT)      Associated Diagnoses    Environmental and seasonal allergies        Pharmacy    [facility] - [place], [place] - [address] [phone], [phone]

## 2024-06-10 NOTE — ED PROVIDER NOTES
Patient Seen in: [facility]      History     Chief Complaint   Patient presents with    Rash Skin Problem     Stated Complaint: Eczema flair up    Subjective:   HPI    78-year-old female presents to the IC for evaluation of a rash for 5 days.  Started out in her lower legs and forearm, developed on her waistband today.  Rash is very itchy, not painful.    History of eczema from 2 years ago and using a triamcinolone cream from 2 years ago with no improvement in symptoms.    Patient states that she also used a new scent in Dove body wash.    Objective:   Past Medical History:    Blood disorder    DVT, bilateral lower limbs (HCC)    Essential hypertension    History of DVT of lower extremity    Pulmonary embolism (HCC)    Pulmonary embolism with acute cor pulmonale (HCC)    Visual impairment    contacts and glasses              Past Surgical History:   Procedure Laterality Date    Colonoscopy      Colonoscopy N/A 2021    Procedure: COLONOSCOPY with biopsy and forcep polypectomy;  Surgeon: Benjamín Diehl MD;  Location:  ENDOSCOPY    Other surgical history  3/12/16    catheters inserted into lungs                Social History     Socioeconomic History    Marital status:    Tobacco Use    Smoking status: Never    Smokeless tobacco: Never    Tobacco comments:     Updated 24   Vaping Use    Vaping status: Never Used   Substance and Sexual Activity    Alcohol use: Not Currently     Alcohol/week: 0.0 standard drinks of alcohol    Drug use: No    Sexual activity: Yes     Partners: Male   Other Topics Concern    Caffeine Concern Yes     Comment: coffee    Exercise No   Social History Narrative    . Her   of lung cancer, he was a smoker. She has 1 child and 2 grandchildren. Carlota worked in IT, retired 2019.      Social Determinants of Health      Received from [facility], [facility]    Social Connections    Received  from [facility], [facility]    Housing Stability              Review of Systems    Positive for stated complaint: Eczema flair up  Other systems are as noted in HPI.  Constitutional and vital signs reviewed.      All other systems reviewed and negative except as noted above.    Physical Exam     ED Triage Vitals [06/10/24 1026]   /77   Pulse 52   Resp 19   Temp 97.6 °F (36.4 °C)   Temp src Temporal   SpO2 96 %   O2 Device None (Room air)       Current Vitals:   Vital Signs  BP: 139/77  Pulse: 52  Resp: 19  Temp: 97.6 °F (36.4 °C)  Temp src: Temporal    Oxygen Therapy  SpO2: 96 %  O2 Device: None (Room air)            Physical Exam  Vitals and nursing note reviewed.   Constitutional:       Appearance: She is well-developed.   HENT:      Head: Atraumatic.      Right Ear: External ear normal.      Left Ear: External ear normal.   Eyes:      Conjunctiva/sclera: Conjunctivae normal.   Cardiovascular:      Rate and Rhythm: Normal rate.   Pulmonary:      Effort: Pulmonary effort is normal.   Musculoskeletal:      Cervical back: Normal range of motion and neck supple.   Skin:     General: Skin is warm and dry.      Capillary Refill: Capillary refill takes less than 2 seconds.      Findings: Rash (scattered papules to BLE, bilateral forearm, waist) present.   Neurological:      Mental Status: She is alert and oriented to person, place, and time.   Psychiatric:         Mood and Affect: Mood normal.                         ED Course   Labs Reviewed - No data to display                   MDM        79yo female presents with itchy rash x 5 days. H/o eczema.    Differential diagnosis includes but not limited to:  Contact dermatitis, atopic dermatitis, insect bite    Explained to patient that the rash is not consistent with typical eczema.  The rash is on all her exposed area, so I do have a suspicion of this being related to insect bites.  I did recommend that she wash all her  bedding's and hot water.  Patient states she did recently came back from a \"senior trip\" on a bus.  She also switched to a scent and her Dove body wash, may be sensitivity to the change in scent.  Will trial a course of triamcinolone cream.   Advised follow up with dermatology. All questions answered.                                 Medical Decision Making      Disposition and Plan     Clinical Impression:  1. Rash         Disposition:  Discharge  6/10/2024 11:30 am    Follow-up:  Rachel Hamm  [address] [address]  [phone]                Medications Prescribed:  Discharge Medication List as of 6/10/2024 11:32 AM        START taking these medications    Details   triamcinolone 0.5 % External Cream Apply 1 Application topically 3 (three) times daily for 7 days., Normal, Disp-15 g, R-0

## 2024-06-14 ENCOUNTER — TELEPHONE (OUTPATIENT)
Dept: NEUROLOGY | Facility: CLINIC | Age: 79
End: 2024-06-14

## 2024-07-09 ENCOUNTER — TELEPHONE (OUTPATIENT)
Dept: NEUROLOGY | Facility: CLINIC | Age: 79
End: 2024-07-09

## 2024-07-09 NOTE — TELEPHONE ENCOUNTER
Sweat gland nerve fiber received from [facility]. Endorsed to provider. Does not appear received previously.

## 2024-07-19 NOTE — TELEPHONE ENCOUNTER
Medication: Gabapentin 100 mg     Date of last refill: 06/20/2024 (#60/0)  [place]     Last office visit: 06/03/2024  Due back to clinic per last office note:  04/24/2024  Date next office visit scheduled:    Future Appointments   Date Time Provider Department Center   7/22/2024  2:00 PM Dawood Gaona PA-C [facility] [address]   8/12/2024 10:30 AM Clint Alcazar MD [facility]   8/14/2024  8:20 AM Garrison Villanueva MD [facility]   11/8/2024 11:20 AM Elodia Brock MD [facility] [address] [facility]           Last OV note recommendation:    Peripheral neuropathy of unclear etiology.  EMG and NCS was negative for any large fiber neuropathy  Small fiber neuropathy is still possible  Has superimposed chronic b/l L5 radiculopathies        Discussed skin nerve biopsy and patient would like to proceed     Continue diet modification for prediabetes     Advise proper foot wear and balance exercises     Will call her back for skin biopsy once PA is completed

## 2024-07-22 ENCOUNTER — TELEPHONE (OUTPATIENT)
Facility: CLINIC | Age: 79
End: 2024-07-22

## 2024-07-22 ENCOUNTER — OFFICE VISIT (OUTPATIENT)
Facility: CLINIC | Age: 79
End: 2024-07-22
Payer: MEDICARE

## 2024-07-22 ENCOUNTER — HOSPITAL ENCOUNTER (OUTPATIENT)
Dept: GENERAL RADIOLOGY | Age: 79
Discharge: HOME | End: 2024-07-22
Attending: PHYSICIAN ASSISTANT
Payer: MEDICARE

## 2024-07-22 ENCOUNTER — TELEPHONE (OUTPATIENT)
Dept: ORTHOPEDICS CLINIC | Facility: CLINIC | Age: 79
End: 2024-07-22

## 2024-07-22 VITALS — BODY MASS INDEX: 24.85 KG/M2 | HEIGHT: 66 IN | WEIGHT: 154.63 LBS

## 2024-07-22 DIAGNOSIS — M17.12 PRIMARY OSTEOARTHRITIS OF LEFT KNEE: Primary | ICD-10-CM

## 2024-07-22 DIAGNOSIS — M17.12 PRIMARY OSTEOARTHRITIS OF LEFT KNEE: ICD-10-CM

## 2024-07-22 PROCEDURE — 99213 OFFICE O/P EST LOW 20 MIN: CPT | Performed by: PHYSICIAN ASSISTANT

## 2024-07-22 PROCEDURE — 73564 X-RAY EXAM KNEE 4 OR MORE: CPT | Performed by: PHYSICIAN ASSISTANT

## 2024-07-22 RX ORDER — GABAPENTIN 100 MG/1
100 CAPSULE ORAL 2 TIMES DAILY
Qty: 60 CAPSULE | Refills: 0 | Status: SHIPPED | OUTPATIENT
Start: 2024-07-22 | End: 2024-09-30

## 2024-07-22 RX ORDER — TRIAMCINOLONE ACETONIDE 1 MG/G
CREAM TOPICAL
COMMUNITY
Start: 2024-06-18

## 2024-07-22 RX ORDER — METHYLPREDNISOLONE 4 MG/1
TABLET ORAL
Qty: 21 EACH | Refills: 0 | Status: SHIPPED | OUTPATIENT
Start: 2024-07-22

## 2024-07-22 NOTE — PROGRESS NOTES
[facility] Progress Note    Subjective: Patient returns to clinic after last being seen on 5/22/2024 by Dr. Villanueva for steroid injection into the left knee.  She reports about 1 month of relief from the injection and is interested in discussing and exploring alternative nonsurgical treatments.  She continues to experience pain in the left knee made worse with activity.  She takes Tylenol and applies topical Voltaren, Biofreeze, Arnica which does offer temporary relief of symptoms.     Objective: Patient seated comfortably in the exam chair.  Alert and oriented x 3.  No acute distress.  Nonlabored breathing.  Left knee without any redness, warmth, or effusion noted.  Demonstrates full active knee extension and flex to approximately 130 degrees.    Imaging: Radiographs of the left knee personally viewed, independently interpreted and radiology report read.  Radiographs demonstrate moderate to severe degree of medial compartmental joint space narrowing with osteophytic lipping along the medial femoral condyle.    Assessment/Plan: 78-year-old female with exacerbated radiographically moderate to severe left knee osteoarthritis.  We did discuss nonsurgical treatments and plan had been to pursue Zilretta injection of the left knee and the patient wishes to continue with this plan.  She is scheduled already with Dr. Villanueva next month and Zilretta prior authorization has been sent earlier today.  She cannot take NSAIDs due to Eliquis usage, however I did discuss possible turmeric use and encouraged her to discuss this with Dr. Alcazar.  I also in the meantime sent a Medrol Dosepak to the patient's pharmacy to help control her knee pain.  I did briefly discuss and answer some of her questions pertaining to surgery as well as recovery.  She will follow-up in 1 month with Dr. Villanueva for Zilretta injection into the left knee.  All questions were sought and answered satisfactorily.  She is happy with the plan and will  follow as advised.      Dawood Gaona PA-C  [facility] Orthopedic Surgery    This note was dictated using Dragon software.  While it was briefly proofread prior to completion, some grammatical, spelling, and word choice errors due to dictation may still occur.

## 2024-08-12 ENCOUNTER — OFFICE VISIT (OUTPATIENT)
Dept: HEMATOLOGY/ONCOLOGY | Facility: HOSPITAL | Age: 79
End: 2024-08-12
Attending: INTERNAL MEDICINE
Payer: MEDICARE

## 2024-08-12 VITALS
TEMPERATURE: 98 F | RESPIRATION RATE: 18 BRPM | OXYGEN SATURATION: 97 % | BODY MASS INDEX: 26 KG/M2 | SYSTOLIC BLOOD PRESSURE: 134 MMHG | HEART RATE: 54 BPM | DIASTOLIC BLOOD PRESSURE: 78 MMHG | WEIGHT: 160.5 LBS

## 2024-08-12 DIAGNOSIS — D68.52 PROTHROMBIN GENE MUTATION (HCC): ICD-10-CM

## 2024-08-12 DIAGNOSIS — Z86.711 HISTORY OF PULMONARY EMBOLUS (PE): Primary | ICD-10-CM

## 2024-08-12 DIAGNOSIS — I26.09 OTHER PULMONARY EMBOLISM WITH ACUTE COR PULMONALE, UNSPECIFIED CHRONICITY (HCC): ICD-10-CM

## 2024-08-12 DIAGNOSIS — Z71.89 ENCOUNTER FOR ANTICOAGULATION DISCUSSION AND COUNSELING: ICD-10-CM

## 2024-08-12 DIAGNOSIS — Z79.01 CHRONIC ANTICOAGULATION: ICD-10-CM

## 2024-08-12 LAB
ALBUMIN SERPL-MCNC: 4.4 G/DL (ref 3.2–4.8)
ALBUMIN/GLOB SERPL: 1.8 {RATIO} (ref 1–2)
ALP LIVER SERPL-CCNC: 55 U/L
ALT SERPL-CCNC: 14 U/L
ANION GAP SERPL CALC-SCNC: 4 MMOL/L (ref 0–18)
AST SERPL-CCNC: 15 U/L (ref ?–34)
BASOPHILS # BLD AUTO: 0.05 X10(3) UL (ref 0–0.2)
BASOPHILS NFR BLD AUTO: 0.6 %
BILIRUB SERPL-MCNC: 0.6 MG/DL (ref 0.2–1.1)
BUN BLD-MCNC: 13 MG/DL (ref 9–23)
CALCIUM BLD-MCNC: 9.7 MG/DL (ref 8.7–10.4)
CHLORIDE SERPL-SCNC: 101 MMOL/L (ref 98–112)
CO2 SERPL-SCNC: 27 MMOL/L (ref 21–32)
CREAT BLD-MCNC: 0.67 MG/DL
EGFRCR SERPLBLD CKD-EPI 2021: 89 ML/MIN/1.73M2 (ref 60–?)
EOSINOPHIL # BLD AUTO: 0.2 X10(3) UL (ref 0–0.7)
EOSINOPHIL NFR BLD AUTO: 2.4 %
ERYTHROCYTE [DISTWIDTH] IN BLOOD BY AUTOMATED COUNT: 12.9 %
GLOBULIN PLAS-MCNC: 2.5 G/DL (ref 2–3.5)
GLUCOSE BLD-MCNC: 84 MG/DL (ref 70–99)
HCT VFR BLD AUTO: 36.5 %
HGB BLD-MCNC: 12.8 G/DL
IMM GRANULOCYTES # BLD AUTO: 0.03 X10(3) UL (ref 0–1)
IMM GRANULOCYTES NFR BLD: 0.4 %
LYMPHOCYTES # BLD AUTO: 1.6 X10(3) UL (ref 1–4)
LYMPHOCYTES NFR BLD AUTO: 19.6 %
MCH RBC QN AUTO: 32.7 PG (ref 26–34)
MCHC RBC AUTO-ENTMCNC: 35.1 G/DL (ref 31–37)
MCV RBC AUTO: 93.1 FL
MONOCYTES # BLD AUTO: 0.97 X10(3) UL (ref 0.1–1)
MONOCYTES NFR BLD AUTO: 11.9 %
NEUTROPHILS # BLD AUTO: 5.33 X10 (3) UL (ref 1.5–7.7)
NEUTROPHILS # BLD AUTO: 5.33 X10(3) UL (ref 1.5–7.7)
NEUTROPHILS NFR BLD AUTO: 65.1 %
OSMOLALITY SERPL CALC.SUM OF ELEC: 273 MOSM/KG (ref 275–295)
PLATELET # BLD AUTO: 245 10(3)UL (ref 150–450)
POTASSIUM SERPL-SCNC: 4.2 MMOL/L (ref 3.5–5.1)
PROT SERPL-MCNC: 6.9 G/DL (ref 5.7–8.2)
RBC # BLD AUTO: 3.92 X10(6)UL
SODIUM SERPL-SCNC: 132 MMOL/L (ref 136–145)
WBC # BLD AUTO: 8.2 X10(3) UL (ref 4–11)

## 2024-08-12 PROCEDURE — 99214 OFFICE O/P EST MOD 30 MIN: CPT | Performed by: INTERNAL MEDICINE

## 2024-08-12 NOTE — PROGRESS NOTES
Hematology Clinic Follow Up Visit    Patient Name: Carlota Martinez  Medical Record Number: [id number]   YOB: 1945    PCP: Dr. Elodia Brock  Other providers:  Dr. Sahu (cardiology/vascular)    Reason for Consultation:  Carlota Martinez was seen today for the diagnosis of PE and DVT    Hematologic History:  *Unprovoked Acute massive PE and BLE DVT  -3/12/16- presented to ED with dyspnea, hypotension, tachycardia.  CTA showed massive bilateral proximal PEs (but not saddle), BLE venous doppler showed bilateral distal DVTs.  TTE showed LVEF of 55-60%, increased size of RV with elevated RVSP.  Underwent CDT to pulmonary arteries (Dr. Sahu) then transitioned to UFH gtt   -3/15/16- transitioned to eliquis    =============================  Interval events:  Presents for follow up.  She remains on eliquis 2.5mg BID without any bleeding or excessive bruising.  No dyspnea, chest pain, or LE edema.  She denies any leg pains other than some chronic left knee pain.  She has had prior cortisone injections though her last cortisone injection in May only lasted about a month.  She will be following up with orthopedics.  She is otherwise feeling well.  No upcoming procedures planned.    Past Medical History:  Past Medical History:    Blood disorder    DVT, bilateral lower limbs (HCC)    Essential hypertension    History of DVT of lower extremity    Pulmonary embolism (HCC)    Pulmonary embolism with acute cor pulmonale (HCC)    Visual impairment    contacts and glasses     Past Surgical History:   Procedure Laterality Date    Colonoscopy      Colonoscopy N/A 11/11/2021    Procedure: COLONOSCOPY with biopsy and forcep polypectomy;  Surgeon: Benjamín Diehl MD;  Location:  ENDOSCOPY    Other surgical history  3/12/16    catheters inserted into lungs     Home Medications:   GABAPENTIN 100 MG Oral Cap TAKE 1 CAPSULE(100 MG) BY MOUTH TWICE DAILY 60 capsule 0    triamcinolone 0.1 % External Cream        Neomycin-Polymyxin-HC 1 % Otic Solution 4 drops to affected ear TID for 10 days, may repeat course as directed 30 mL 1    Glucosamine-Chondroitin (GLUCOSAMINE CHONDR COMPLEX OR) Take by mouth.      apixaban (ELIQUIS) 2.5 MG Oral Tab Take 1 tablet (2.5 mg total) by mouth 2 (two) times daily. 180 tablet 3    METOPROLOL TARTRATE 50 MG Oral Tab TAKE ONE-HALF TABLET BY MOUTH  TWICE DAILY 90 tablet 3    MONTELUKAST 10 MG Oral Tab TAKE 1 TABLET BY MOUTH DAILY AS  NEEDED 90 tablet 3    AMLODIPINE 5 MG Oral Tab TAKE 1 TABLET BY MOUTH DAILY 90 tablet 3    Biotin w/ Vitamins C & E (HAIR/SKIN/NAILS OR) Take by mouth daily.      Vitamin D3, Cholecalciferol, 1000 UNITS Oral Cap Take 1 capsule (1,000 Units total) by mouth 2 (two) times daily.      Multiple Vitamins-Minerals (SENTRY SENIOR) Oral Tab Take by mouth daily.        OCUVITE Oral Tab Take 1 tablet by mouth daily as needed.       Allergies:   Allergies   Allergen Reactions    Other UNKNOWN     Hay fever       Psychosocial History:  Social History     Social History Narrative    . Her   of lung cancer, he was a smoker. She has 1 child and 2 grandchildren. Carlota worked in [employer], retired 2019.      Social History     Socioeconomic History    Marital status:    Tobacco Use    Smoking status: Never    Smokeless tobacco: Never    Tobacco comments:     Updated 24   Vaping Use    Vaping status: Never Used   Substance and Sexual Activity    Alcohol use: Not Currently     Alcohol/week: 0.0 standard drinks of alcohol    Drug use: No    Sexual activity: Yes     Partners: Male   Other Topics Concern    Caffeine Concern Yes     Comment: coffee    Exercise No   Social History Narrative    . Her   of lung cancer, he was a smoker. She has 1 child and 2 grandchildren. Carlota worked in [employer], retired 2019.      Social Determinants of Health      [facility], [facility]    Social Connections     Received from [facility], [facility]    Housing Stability     Family Medical History:  Family History   Problem Relation Age of Onset    Cancer Father 90        prostate     Heart Disorder Father     High Blood Pressure Father     Other (Other) Father     Cancer Mother         lung, was a smoker    Heart Disorder Mother     High Blood Pressure Mother     Other (Other) Sister     Asthma Paternal Grandfather     Cancer Maternal Grandfather     Heart Disorder Maternal Grandmother     Diabetes Sister     Fibromyalgia Sister     Breast Cancer Maternal Cousin Female 60    Clotting Disorder Neg        Review of Systems:  A 10-point ROS was done with pertinent positives and negative per the HPI    Vital Signs:  Height: --  Weight: 72.8 kg (160 lb 8 oz) (08/12 1011)  BSA (Calculated - sq m): --  Pulse: 54 (08/12 1011)  BP: 134/78 (08/12 1011)  Temp: 97.6 °F (36.4 °C) (08/12 1011)  Do Not Use - Resp Rate: --  SpO2: 97 % (08/12 1011)    Wt Readings from Last 6 Encounters:   08/12/24 72.8 kg (160 lb 8 oz)   07/22/24 70.1 kg (154 lb 9.6 oz)   06/10/24 70.1 kg (154 lb 9.6 oz)   06/03/24 71.9 kg (158 lb 9.6 oz)   06/03/24 72.1 kg (158 lb 14.4 oz)   05/22/24 73.8 kg (162 lb 12.8 oz)     Physical Examination:  General: Patient is alert and oriented, not in acute distress  Psych: Mood and affect are appropriate  Eyes: EOMI  ENT: Oropharynx is clear  CV: Regular rate and rhythm, no murmurs  Respiratory: Lungs clear to auscultation bilaterally  GI/Abd: Soft, non-tender with normoactive bowel sounds, no hepatosplenomegaly  Neurological: Grossly intact   Lymphatics: No palpable lymphadenopathy  Skin: no rashes or petechiae  Ext: no LE edema or calf tenderness    Laboratory:  Lab Results   Component Value Date    WBC 9.2 02/12/2024    WBC 7.7 08/16/2023    WBC 7.2 02/06/2023    HGB 13.4 02/12/2024    HGB 12.5 08/16/2023    HGB 12.8 02/06/2023    HCT 40.1 02/12/2024    MCV 94.1 02/12/2024    MCH  31.5 02/12/2024    MCHC 33.4 02/12/2024    RDW 12.6 02/12/2024    .0 02/12/2024    .0 08/16/2023    .0 02/06/2023     Lab Results   Component Value Date    GLU 84 08/12/2024    BUN 13 08/12/2024    BUNCREA 13.3 07/21/2021    CREATSERUM 0.67 08/12/2024    CREATSERUM 0.67 02/12/2024    CREATSERUM 0.70 08/16/2023    ANIONGAP 4 08/12/2024    GFR 79 09/21/2017    GFRNAA 78 07/27/2022    GFRAA 90 07/27/2022    CA 9.7 08/12/2024    OSMOCALC 273 (L) 08/12/2024    ALKPHO 55 08/12/2024    AST 15 08/12/2024    ALT 14 08/12/2024    BILT 0.6 08/12/2024    TP 6.9 08/12/2024    ALB 4.4 08/12/2024    GLOBULIN 2.5 08/12/2024     (L) 08/12/2024    K 4.2 08/12/2024     08/12/2024    CO2 27.0 08/12/2024     Lab Results   Component Value Date    .4 (H) 03/15/2016    INR 1.35 (H) 03/13/2016     Component      Latest Ref Rng 3/14/2016   STACLOT LUPUS ANTICOAGULANT      Negative Positive (A)   PATHOLOGIST INTERPRETATION       Results indicate the presence of a lupus anticoagulant. Repeat testing in 12 weeks is recommended to rule out transient antibodies that are considered non-pathologic. . . .   DRVVT LUPUS ANTICOAGULANT      Negative Negative   PTT (LUPUS)      25.0-34.0 seconds 276.2 (H)   PTT (Hepzyme)      25-34 seconds 49.0 (H)   STACLOT LA DELTA      <=8.00 seconds 15.30 (H)   DRVVT RATIO      0.00-1.29 1.05   BETA 2 GLYCOPROTEIN 1 AB, IgM      <=15.0 U/mL <3.7   BETA 2 GLYCOPROTEIN 1 AB, IgG      <=15.0 U/mL <3.7   PHOSPHOLIPID AB, IgM      Negative Negative   PHOSPHOLIPID AB, IgG      Negative Negative     Component      Latest Ref Rng 3/30/2016   AT3 ACTIVITY      80.0-120.0 % 105.0   PROTEIN C ACTIVITY       % >150 (H)   FACTOR V LEIDEN (R506Q) MUT      No Mutation No Mutation   PROTHROMBIN [id number] MUTATION      No Mutation Heterozygous (A)     Component      Latest Ref Rng 6/20/2016   Protein S Free, Ag       % 89     Imaging:    As reviewed above    Impression & Plan:      *unprovoked BLE DVT + massive bilateral PE s/p CDT, dx'd 3/2016  -+heterozygous PT mutation, non-confirmed positive APLA test (was drawn prior to starting eliquis, but did require 2 hepzyme treatments as pt was on UFH gtt at the time which may possibly interfere with results).  She prefers to remain on eliquis despite discussing guidelines for warfarin if determined to have APLS (which she is not confirmed to have), therefore repeat APLA testing has not been performed.  She is up to date with age appropriate cancer screening, she lacks as signs/symptoms of an occult malignancy.    -given unprovoked nature of her clot, we have continued chronic anticoagulation  -tolerating eliquis well without any issues, will continue eliquis 2.5mg BID at this time.     -monitor CBC and CMP q6 months while on anticoagulation    RTC in 6 months    Clint Alcazar MD  Hematology/Medical Oncology  [facility]

## 2024-08-14 ENCOUNTER — OFFICE VISIT (OUTPATIENT)
Dept: ORTHOPEDICS CLINIC | Facility: CLINIC | Age: 79
End: 2024-08-14
Payer: MEDICARE

## 2024-08-14 VITALS — BODY MASS INDEX: 26.34 KG/M2 | WEIGHT: 160 LBS | HEIGHT: 65.5 IN

## 2024-08-14 DIAGNOSIS — M17.12 PRIMARY OSTEOARTHRITIS OF LEFT KNEE: Primary | ICD-10-CM

## 2024-08-14 PROCEDURE — 99214 OFFICE O/P EST MOD 30 MIN: CPT | Performed by: ORTHOPAEDIC SURGERY

## 2024-08-14 PROCEDURE — 20610 DRAIN/INJ JOINT/BURSA W/O US: CPT | Performed by: ORTHOPAEDIC SURGERY

## 2024-08-14 NOTE — PROGRESS NOTES
[facility] Progress Note    Subjective: Patient returns to clinic for discussion of her left knee.  She was last seen by Dawood last month, previously had injection by me 3 months ago.  She notes that symptoms have gotten significantly worse, she feels more limited due to the pain in her knee.  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  She feels that activities and lifestyle are limited due to the worsening pain.  She does note that previous steroid injections are not providing the same relief anymore.  She does get some radiation of pain up the thigh towards the hip as well.  She feels surgery may be in her near future.    Objective: Patient appears comfortable, very pleasant.  No effusion to the knee.  She demonstrates near full extension, flexion past 120.      Imaging: X-rays of the left knee from July 2024 personally viewed, independently interpreted and radiology report read.  Demonstrates progression of osteoarthritis compared to previous films from 1 year ago.  There is near complete loss of medial joint space, osteophyte formation tricompartmental including lateral and patellofemoral.  Kellgren Maynor grade 3-4.      Assessment/Plan: 79-year-old female with symptomatic radiographically moderate to severe left knee osteoarthritis.  Revisited the discussion of treatment options including nonsurgical and surgical. I discussed risks and benefits of surgery, with risks including but not limited to infection, blood clots, fracture, bleeding/need for blood transfusion, injury to blood vessels and nerves, loosening or failure of components, stiffness, continued pain, need for further intervention or revision surgery.  Additionally I discussed expectations with regards to the postoperative recovery timeframe, the possibility of mechanical clicking with the knee, numbness on the lateral side of the knee/leg from sacrifice of the infrapatellar branch of the saphenous nerve, and potential  for difficulty/pain with kneeling and performing deep flexion activities.  She expressed understanding of this discussion.  She may want to schedule surgery and therefore Binder was provided for her today.  She need only call to schedule a date if she would like.  We did proceed with a Zilretta injection today as this has been approved and she has not attempted it yet, and she will monitor her response to this.  If she does get 3 months or longer relief, she may consider continuing nonsurgical treatments for now, however if not she would be a candidate for surgery and she need only call to schedule.  Would review for any needed clearances at that time.    Garrison Villanueva MD, FAAOS  [facility] Orthopaedic Surgery  Phone [phone]  Fax [phone]

## 2024-08-14 NOTE — PROCEDURES
Risks and benefits of knee injection discussed with the patient, with risks including but not limited to pain and swelling at the injection site and/or within the knee joint, infection, elevation in blood pressure and/or glucose levels, facial flushing.  After informed consent, the patient's left knee was marked, locally anesthetized with skin refrigerant, prepped with topical antiseptic, and injected with 5mL of 32mg/5mL Zilretta through the inferolateral portal.  A band-aid was applied.  The patient tolerated the procedure well.    Garrison Villanueva MD, FAAOS  [facility] Orthopedic Surgery  Phone [phone]  Fax [phone]

## 2024-09-09 ENCOUNTER — TELEPHONE (OUTPATIENT)
Dept: ORTHOPEDICS CLINIC | Facility: CLINIC | Age: 79
End: 2024-09-09

## 2024-09-11 ENCOUNTER — DOCUMENTATION ONLY (OUTPATIENT)
Dept: ORTHOPEDICS CLINIC | Facility: CLINIC | Age: 79
End: 2024-09-11

## 2024-09-11 ENCOUNTER — TELEPHONE (OUTPATIENT)
Dept: ORTHOPEDICS CLINIC | Facility: CLINIC | Age: 79
End: 2024-09-11

## 2024-09-11 DIAGNOSIS — M17.12 PRIMARY OSTEOARTHRITIS OF LEFT KNEE: Primary | ICD-10-CM

## 2024-09-11 NOTE — PROGRESS NOTES
SURGERY SCHEDULING SHEET    Carlota Martinez  7/29/1945  [id number]    Procedure: Left total knee arthroplasty    CPT: 75998    Diagnosis: Left knee osteoarthritis    Anesthesia: Choice    Length of Surgery: 2 hours    Disposition: Inpatient    Instruments: Medacta TKA    Assist: If case scheduled on Thurs/Fri, then Dawood Gaona first assist. If case scheduled on Tues, then Paul Dawnabenimonica ([phone]) first assist. If Paul unavailable, then please communicate to Dawood Gaona/Facundo/Spenser to cancel Dawood's clinic for that day and have Dawood first assist. If Dawood unavailable, contact Oswaldo Avila for first assist. If Paul and Oswaldo unavailable, then contact [facility] for first assist.    Pre-op Testing: CBC, CMP, PT/INR, PTT, TYPE AND SCREEN, and MRSA/MSSA THROUGH ED[facility] PAT    Clearance: MEDICAL/PCP and HEMATOLOGY (at least need to make sure that she is acceptable risk to come off eliquis for surgery)    Post op: 2 weeks postop appointment with Dawood Gaona    Surgery date specifics: Next available    Garrison Villanueva MD, FAAOS  [facility] Orthopedic Surgery  Phone: [phone]  Fax: [phone]

## 2024-09-11 NOTE — TELEPHONE ENCOUNTER
Date of Surgery:    2/25/2025    Post Op Appt:  3/12/2025 1020    Case ID: [id number]     Notes: WAIT LIST              SURGERY SCHEDULING SHEET     Carlota Martinez  7/29/1945  [id number]     Procedure: Left total knee arthroplasty     CPT: 43062     Diagnosis: Left knee osteoarthritis     Anesthesia: Choice     Length of Surgery: 2 hours     Disposition: Inpatient     Instruments: Medacta TKA     Assist: If case scheduled on Thurs/Fri, then Dawood Gaona first assist. If case scheduled on Tues, then Paul Correa ([phone]) first assist. If Paul unavailable, then please communicate to Dawood Gaona/Facundo/Spenser to cancel Dawood's clinic for that day and have Dawood first assist. If Dawood unavailable, contact Oswaldo Avila for first assist. If Paul and Oswaldo unavailable, then contact [facility] for first assist.     Pre-op Testing: CBC, CMP, PT/INR, PTT, TYPE AND SCREEN, and MRSA/MSSA THROUGH ED[facility] PAT     Clearance: MEDICAL/PCP and HEMATOLOGY (at least need to make sure that she is acceptable risk to come off eliquis for surgery)     Post op: 2 weeks postop appointment with Dawood Gaona     Surgery date specifics: Next available     Garrison Villanueva MD, AMELIAOS  [facility] Surgery  Phone: [phone]  Fax: [phone]

## 2024-09-12 ENCOUNTER — TELEPHONE (OUTPATIENT)
Dept: INTERNAL MEDICINE CLINIC | Facility: CLINIC | Age: 79
End: 2024-09-12

## 2024-09-12 NOTE — TELEPHONE ENCOUNTER
Received a fax today from:    Dr. Garrison Villanueva   [facility] Orthopedic Surgery  [facility] [place] [address]     Ph. [phone]  Fax. [phone]    Surgical Procedure- Left total knee arthroplasty       Date of Pre-surgical visit with Dr. Elodia Brock See Below  Future Appointments   Date Time Provider Department Center   2/7/2025  9:00 AM Elodia Brock MD [facility] [address] [facility]      Date of Surgery: 2/25/2025    Form placed in red folder.

## 2024-09-12 NOTE — TELEPHONE ENCOUNTER
Spoke with patient and we scheduled surgery, post op and went over pre operative procedures. All questions answered.    PCP CLEARANCE SENT    Patient is aware to make sure that she is acceptable risk to come off eliquis for surgery

## 2024-09-16 ENCOUNTER — PATIENT MESSAGE (OUTPATIENT)
Dept: INTERNAL MEDICINE CLINIC | Facility: CLINIC | Age: 79
End: 2024-09-16

## 2024-09-16 DIAGNOSIS — Z12.31 ENCOUNTER FOR SCREENING MAMMOGRAM FOR MALIGNANT NEOPLASM OF BREAST: Primary | ICD-10-CM

## 2024-09-18 NOTE — TELEPHONE ENCOUNTER
From: Carlota Martinez  To: Elodia Brock  Sent: 9/16/2024 11:23 AM CDT  Subject: Mammogram Order    Dear Dr. Brock:    I received a request through [employer] to contact you so that you can put in an order for a mammogram.    I believe once that's completed, I can call Central Scheduling and have it done.     Thank you!   Carlota Martinez

## 2024-09-18 NOTE — TELEPHONE ENCOUNTER
Patient requesting order for mammogram    LOV 6/3/24  Upcoming OV 11/8/24    Please approve mammogram order

## 2024-09-19 ENCOUNTER — TELEPHONE (OUTPATIENT)
Dept: ORTHOPEDICS CLINIC | Facility: CLINIC | Age: 79
End: 2024-09-19

## 2024-09-20 NOTE — TELEPHONE ENCOUNTER
CLEVEOM asking patient to contact our office if she would like to schedule an appointment for left knee surgical discussion with Dr. Rice.

## 2024-09-26 NOTE — TELEPHONE ENCOUNTER
RESCHEDULED TO:    Date of Surgery: 11/15/2024       Post Op Appt: 11/27/2024 1020     Case ID: [id number]     Notes: NEW PCP CLEARANCE SENT

## 2024-09-27 ENCOUNTER — DOCUMENTATION ONLY (OUTPATIENT)
Dept: HEMATOLOGY/ONCOLOGY | Facility: HOSPITAL | Age: 79
End: 2024-09-27

## 2024-09-27 NOTE — PROGRESS NOTES
Brief hematology clearance note    Patient recently seen in clinic on 8/12/24, see associated progress note including H&P from that date.  She is currently on Eliquis maintenance given prior history of unprovoked DVT/PE.  Currently scheduled for planned left total knee arthroplasty by Dr. Villanueva on 11/15/2024.  She is cleared from hematology perspective to proceed with the surgery as scheduled.  I recommend she hold Eliquis for 72 hours prior to the procedure.  She should resume Eliquis or some other anticoagulant within 24-72 hours following the surgical procedure unless hemostasis is compromised.    Clint Alcazar MD  Hematology/Medical Oncology  [facility]

## 2024-09-30 DIAGNOSIS — I10 BENIGN ESSENTIAL HTN: ICD-10-CM

## 2024-09-30 RX ORDER — GABAPENTIN 100 MG/1
100 CAPSULE ORAL 2 TIMES DAILY
Qty: 60 CAPSULE | Refills: 0 | Status: SHIPPED | OUTPATIENT
Start: 2024-09-30

## 2024-09-30 NOTE — TELEPHONE ENCOUNTER
Medication:  GABAPENTIN 100 MG Oral Cap      Date of last refill:  7/22/2024(#60/0)  [place]     Last office visit: 06/03/2024// 4/24/2024  Due back to clinic per last office note:  4 weeks   Date next office visit scheduled:    Future Appointments   Date Time Provider Department Center   10/18/2024 11:20 AM  [facility]  M[facility]   10/28/2024 11:40 AM Elodia Brock MD [facility] [address] [facility]   11/4/2024 11:30 AM Clint Alcazar MD  HEM ONC [facility]   11/8/2024 11:20 AM Elodia Brock MD [facility] [address] [facility]   11/27/2024 10:20 AM Dawood Gaona PA-C [facility] ORTHO [facility]   12/23/2024 10:40 AM Garrison Villanueva MD [facility] ORTHOPL [facility] [address]           Last OV note recommendation:    ASSESSMENT/PLAN:          ICD-10-CM     1. Small fiber neuropathy  G62.9         2. Numbness and tingling of both feet  R20.0       R20.2                  Peripheral neuropathy of unclear etiology.  EMG and NCS was negative for any large fiber neuropathy  Small fiber neuropathy is still possible  Has superimposed chronic b/l L5 radiculopathies        Discussed skin nerve biopsy and patient would like to proceed     Continue diet modification for prediabetes     Advise proper foot wear and balance exercises     Will call her back for skin biopsy once PA is completed        See orders and medications filed with this encounter. The patient indicates understanding of these issues and agrees with the plan.     Anette Russo MD  [facility]

## 2024-10-01 ENCOUNTER — LAB ENCOUNTER (OUTPATIENT)
Dept: LAB | Facility: HOSPITAL | Age: 79
End: 2024-10-01
Attending: INTERNAL MEDICINE
Payer: MEDICARE

## 2024-10-01 DIAGNOSIS — R73.9 BLOOD GLUCOSE ELEVATED: ICD-10-CM

## 2024-10-01 DIAGNOSIS — R20.2 PARESTHESIA OF BOTH FEET: ICD-10-CM

## 2024-10-01 DIAGNOSIS — I10 BENIGN ESSENTIAL HTN: ICD-10-CM

## 2024-10-01 LAB
CHOLEST SERPL-MCNC: 219 MG/DL (ref ?–200)
EST. AVERAGE GLUCOSE BLD GHB EST-MCNC: 131 MG/DL (ref 68–126)
FASTING PATIENT LIPID ANSWER: NO
HBA1C MFR BLD: 6.2 % (ref ?–5.7)
HDLC SERPL-MCNC: 67 MG/DL (ref 40–59)
LDLC SERPL CALC-MCNC: 134 MG/DL (ref ?–100)
NONHDLC SERPL-MCNC: 152 MG/DL (ref ?–130)
TRIGL SERPL-MCNC: 104 MG/DL (ref 30–149)
TSI SER-ACNC: 1.55 MIU/ML (ref 0.55–4.78)
VLDLC SERPL CALC-MCNC: 19 MG/DL (ref 0–30)

## 2024-10-01 PROCEDURE — 83036 HEMOGLOBIN GLYCOSYLATED A1C: CPT

## 2024-10-01 PROCEDURE — 84443 ASSAY THYROID STIM HORMONE: CPT

## 2024-10-01 PROCEDURE — 80061 LIPID PANEL: CPT

## 2024-10-01 PROCEDURE — 36415 COLL VENOUS BLD VENIPUNCTURE: CPT

## 2024-10-01 RX ORDER — MONTELUKAST SODIUM 10 MG/1
TABLET ORAL
Qty: 90 TABLET | Refills: 3 | Status: SHIPPED | OUTPATIENT
Start: 2024-10-01

## 2024-10-01 RX ORDER — AMLODIPINE BESYLATE 5 MG/1
TABLET ORAL
Qty: 90 TABLET | Refills: 3 | Status: SHIPPED | OUTPATIENT
Start: 2024-10-01

## 2024-10-01 RX ORDER — METOPROLOL TARTRATE 50 MG
TABLET ORAL
Qty: 90 TABLET | Refills: 3 | Status: SHIPPED | OUTPATIENT
Start: 2024-10-01

## 2024-10-02 RX ORDER — GABAPENTIN 100 MG/1
100 CAPSULE ORAL 2 TIMES DAILY
Qty: 60 CAPSULE | Refills: 0 | OUTPATIENT
Start: 2024-10-02

## 2024-10-02 NOTE — TELEPHONE ENCOUNTER
Duplicate request denied.     Disp Refills Start End    GABAPENTIN 100 MG Oral Cap 60 capsule 0 9/30/2024 --    Sig - Route: TAKE 1 CAPSULE(100 MG) BY MOUTH TWICE DAILY - Oral    Sent to pharmacy as: Gabapentin 100 MG Oral Capsule (Neurontin)    E-Prescribing Status: Receipt confirmed by pharmacy (9/30/2024  5:15 PM CDT)

## 2024-10-11 ENCOUNTER — HOSPITAL ENCOUNTER (OUTPATIENT)
Age: 79
Discharge: HOME | End: 2024-10-11
Payer: MEDICARE

## 2024-10-11 VITALS
SYSTOLIC BLOOD PRESSURE: 135 MMHG | RESPIRATION RATE: 17 BRPM | DIASTOLIC BLOOD PRESSURE: 80 MMHG | TEMPERATURE: 98 F | OXYGEN SATURATION: 96 % | HEART RATE: 57 BPM

## 2024-10-11 DIAGNOSIS — T63.441A BEE STING, ACCIDENTAL OR UNINTENTIONAL, INITIAL ENCOUNTER: Primary | ICD-10-CM

## 2024-10-11 NOTE — ED PROVIDER NOTES
Patient Seen in: [facility]      History     Chief Complaint   Patient presents with    Bite Sting,Insect     Stated Complaint: mouth problem    Subjective:   79-year-old female presents to immediate care for bee sting on tongue.  Patient was eating a sandwich when she bit into it there was a bee in the sandwich which she believes stung her tongue.  She denies any swelling she denies any airway obstruction, denies any difficulty breathing              Objective:     Past Medical History:    Blood disorder    DVT, bilateral lower limbs (HCC)    Essential hypertension    History of DVT of lower extremity    Pulmonary embolism (HCC)    Pulmonary embolism with acute cor pulmonale (HCC)    Visual impairment    contacts and glasses              Past Surgical History:   Procedure Laterality Date    Colonoscopy      Colonoscopy N/A 2021    Procedure: COLONOSCOPY with biopsy and forcep polypectomy;  Surgeon: Benjamín Diehl MD;  Location:  ENDOSCOPY    Other surgical history  3/12/16    catheters inserted into lungs                Social History     Socioeconomic History    Marital status:    Tobacco Use    Smoking status: Never    Smokeless tobacco: Never    Tobacco comments:     Updated 24   Vaping Use    Vaping status: Never Used   Substance and Sexual Activity    Alcohol use: Not Currently     Alcohol/week: 0.0 standard drinks of alcohol    Drug use: No    Sexual activity: Yes     Partners: Male   Other Topics Concern    Caffeine Concern Yes     Comment: coffee    Exercise No   Social History Narrative    . Her   of lung cancer, he was a smoker. She has 1 child and 2 grandchildren. Carlota worked in [employer], retired 2019.      Social Drivers of Health      [facility]    Social Connections    Received from [facility], [facility]    Housing Stability               Review of Systems   Constitutional: Negative.    Respiratory: Negative.     Cardiovascular: Negative.    Gastrointestinal: Negative.    Skin: Negative.    Neurological: Negative.        Positive for stated complaint: mouth problem  Other systems are as noted in HPI.  Constitutional and vital signs reviewed.      All other systems reviewed and negative except as noted above.    Physical Exam     ED Triage Vitals [10/11/24 1440]   /80   Pulse 57   Resp 17   Temp 97.9 °F (36.6 °C)   Temp src Temporal   SpO2 96 %   O2 Device None (Room air)       Current Vitals:   Vital Signs  BP: 135/80  Pulse: 57  Resp: 17  Temp: 97.9 °F (36.6 °C)  Temp src: Temporal    Oxygen Therapy  SpO2: 96 %  O2 Device: None (Room air)        Physical Exam  Vitals and nursing note reviewed.   Constitutional:       General: She is not in acute distress.  HENT:      Head: Normocephalic.      Mouth/Throat:        Comments: Erythema without swelling or airway obstruction  Cardiovascular:      Rate and Rhythm: Normal rate.   Pulmonary:      Effort: Pulmonary effort is normal.   Musculoskeletal:         General: Normal range of motion.   Skin:     General: Skin is warm and dry.   Neurological:      General: No focal deficit present.      Mental Status: She is alert and oriented to person, place, and time.             ED Course   Labs Reviewed - No data to display                MDM      Medical Decision Making  Pertinent Labs & Imaging studies reviewed. (See chart for details).  Patient coming in with bee sting.   Differential diagnosis includes allergic reaction, insect sting  Will discharge on supportive care.   Patient is comfortable with this plan.     Overall Pt looks good. Non-toxic, well-hydrated and in no respiratory distress. Vital signs are reassuring. Exam is reassuring. I do not believe pt requires and additional diagnostic studies or intervention. I believe pt can be discharged home to continue evaluation as an outpatient.  Follow-up provider given. Discharge instructions given and reviewed. Return for any problems. All understand and agrees with the plan.        Problems Addressed:  Bee sting, accidental or unintentional, initial encounter: acute illness or injury    Risk  OTC drugs.        Disposition and Plan     Clinical Impression:  1. Bee sting, accidental or unintentional, initial encounter         Disposition:  Discharge  10/11/2024  3:07 pm    Follow-up:  Elodia Brock MD  [address] [place] [address]  [phone]                Medications Prescribed:  Discharge Medication List as of 10/11/2024  3:08 PM              Supplementary Documentation:

## 2024-10-11 NOTE — DISCHARGE INSTRUCTIONS
Take an antihistamine, call 911 should you notice any swelling to your tongue.  Follow-up with your primary care physician for any other concerns

## 2024-10-11 NOTE — ED INITIAL ASSESSMENT (HPI)
Pt was eating lunch when a honey bee went into her mouth 1 hour ago, and sting her on the tongue.  There is no pain or swelling  no SOB No CP, no nausea or vomiting .  Just concerned  has been using ice

## 2024-10-14 ENCOUNTER — PATIENT MESSAGE (OUTPATIENT)
Dept: ORTHOPEDICS CLINIC | Facility: CLINIC | Age: 79
End: 2024-10-14

## 2024-10-15 RX ORDER — VIT C/B6/B5/MAGNESIUM/HERB 173 50-5-6-5MG
250 CAPSULE ORAL 2 TIMES DAILY
Status: ON HOLD | COMMUNITY
End: 2024-11-16

## 2024-10-15 RX ORDER — MAGNESIUM OXIDE 400 MG (241.3 MG MAGNESIUM) TABLET
500 TABLET EVERY MORNING
COMMUNITY

## 2024-10-15 RX ORDER — MELATONIN 10 MG
CAPSULE ORAL NIGHTLY
Status: ON HOLD | COMMUNITY
End: 2024-11-15 | Stop reason: CLARIF

## 2024-10-15 RX ORDER — SENNOSIDES 8.6 MG
650 CAPSULE ORAL EVERY 8 HOURS PRN
COMMUNITY
End: 2024-11-16

## 2024-10-15 RX ORDER — CALCIUM CARB/VITAMIN D3/VIT K1 500-500-40
1 TABLET,CHEWABLE ORAL 2 TIMES DAILY
COMMUNITY

## 2024-10-16 NOTE — TELEPHONE ENCOUNTER
She does not need another appointment with me preop unless she would like one or has additional questions she wants to discuss in person

## 2024-10-18 ENCOUNTER — HOSPITAL ENCOUNTER (OUTPATIENT)
Dept: MAMMOGRAPHY | Facility: HOSPITAL | Age: 79
Discharge: HOME | End: 2024-10-18
Attending: INTERNAL MEDICINE
Payer: MEDICARE

## 2024-10-18 DIAGNOSIS — Z12.31 ENCOUNTER FOR SCREENING MAMMOGRAM FOR MALIGNANT NEOPLASM OF BREAST: ICD-10-CM

## 2024-10-18 PROCEDURE — 77067 SCR MAMMO BI INCL CAD: CPT | Performed by: INTERNAL MEDICINE

## 2024-10-18 PROCEDURE — 77063 BREAST TOMOSYNTHESIS BI: CPT | Performed by: INTERNAL MEDICINE

## 2024-10-21 ENCOUNTER — HOSPITAL ENCOUNTER (OUTPATIENT)
Dept: PHYSICAL THERAPY | Facility: HOSPITAL | Age: 79
Discharge: HOME | End: 2024-10-21
Attending: ORTHOPAEDIC SURGERY
Payer: MEDICARE

## 2024-10-21 DIAGNOSIS — M17.12 OSTEOARTHRITIS OF LEFT KNEE: ICD-10-CM

## 2024-10-28 ENCOUNTER — OFFICE VISIT (OUTPATIENT)
Dept: INTERNAL MEDICINE CLINIC | Facility: CLINIC | Age: 79
End: 2024-10-28
Payer: MEDICARE

## 2024-10-28 ENCOUNTER — LAB ENCOUNTER (OUTPATIENT)
Dept: LAB | Age: 79
End: 2024-10-28
Attending: INTERNAL MEDICINE
Payer: MEDICARE

## 2024-10-28 VITALS
HEIGHT: 66 IN | RESPIRATION RATE: 16 BRPM | WEIGHT: 162.81 LBS | HEART RATE: 57 BPM | DIASTOLIC BLOOD PRESSURE: 72 MMHG | OXYGEN SATURATION: 97 % | BODY MASS INDEX: 26.17 KG/M2 | SYSTOLIC BLOOD PRESSURE: 122 MMHG

## 2024-10-28 DIAGNOSIS — Z11.59 NEED FOR HEPATITIS C SCREENING TEST: ICD-10-CM

## 2024-10-28 DIAGNOSIS — M17.12 PRIMARY OSTEOARTHRITIS OF LEFT KNEE: ICD-10-CM

## 2024-10-28 DIAGNOSIS — Z01.818 PRE-OP EXAMINATION: ICD-10-CM

## 2024-10-28 DIAGNOSIS — I10 BENIGN ESSENTIAL HTN: ICD-10-CM

## 2024-10-28 DIAGNOSIS — G62.9 SMALL FIBER NEUROPATHY: ICD-10-CM

## 2024-10-28 DIAGNOSIS — D68.52 PROTHROMBIN GENE MUTATION (HCC): ICD-10-CM

## 2024-10-28 DIAGNOSIS — Z01.818 PRE-OP EXAMINATION: Primary | ICD-10-CM

## 2024-10-28 DIAGNOSIS — Z86.711 HISTORY OF PULMONARY EMBOLUS (PE): ICD-10-CM

## 2024-10-28 DIAGNOSIS — R73.03 PRE-DIABETES: ICD-10-CM

## 2024-10-28 LAB
ALBUMIN SERPL-MCNC: 4.5 G/DL (ref 3.2–4.8)
ALBUMIN/GLOB SERPL: 1.7 {RATIO} (ref 1–2)
ALP LIVER SERPL-CCNC: 54 U/L
ALT SERPL-CCNC: 15 U/L
ANION GAP SERPL CALC-SCNC: 4 MMOL/L (ref 0–18)
ANTIBODY SCREEN: NEGATIVE
APTT PPP: 30.9 SECONDS (ref 23–36)
AST SERPL-CCNC: 17 U/L (ref ?–34)
ATRIAL RATE: 46 BPM
BASOPHILS # BLD AUTO: 0.05 X10(3) UL (ref 0–0.2)
BASOPHILS NFR BLD AUTO: 0.8 %
BILIRUB SERPL-MCNC: 0.5 MG/DL (ref 0.2–1.1)
BUN BLD-MCNC: 12 MG/DL (ref 9–23)
CALCIUM BLD-MCNC: 9.7 MG/DL (ref 8.7–10.4)
CHLORIDE SERPL-SCNC: 98 MMOL/L (ref 98–112)
CO2 SERPL-SCNC: 28 MMOL/L (ref 21–32)
CREAT BLD-MCNC: 0.66 MG/DL
EGFRCR SERPLBLD CKD-EPI 2021: 90 ML/MIN/1.73M2 (ref 60–?)
EOSINOPHIL # BLD AUTO: 0.07 X10(3) UL (ref 0–0.7)
EOSINOPHIL NFR BLD AUTO: 1.1 %
ERYTHROCYTE [DISTWIDTH] IN BLOOD BY AUTOMATED COUNT: 12.5 %
FASTING STATUS PATIENT QL REPORTED: NO
GLOBULIN PLAS-MCNC: 2.6 G/DL (ref 2–3.5)
GLUCOSE BLD-MCNC: 74 MG/DL (ref 70–99)
HCT VFR BLD AUTO: 36.6 %
HCV AB SERPL QL IA: NONREACTIVE
HGB BLD-MCNC: 12.5 G/DL
IMM GRANULOCYTES # BLD AUTO: 0.04 X10(3) UL (ref 0–1)
IMM GRANULOCYTES NFR BLD: 0.6 %
INR BLD: 1.08 (ref 0.8–1.2)
LYMPHOCYTES # BLD AUTO: 2.11 X10(3) UL (ref 1–4)
LYMPHOCYTES NFR BLD AUTO: 32.2 %
MCH RBC QN AUTO: 32.8 PG (ref 26–34)
MCHC RBC AUTO-ENTMCNC: 34.2 G/DL (ref 31–37)
MCV RBC AUTO: 96.1 FL
MONOCYTES # BLD AUTO: 0.87 X10(3) UL (ref 0.1–1)
MONOCYTES NFR BLD AUTO: 13.3 %
NEUTROPHILS # BLD AUTO: 3.41 X10 (3) UL (ref 1.5–7.7)
NEUTROPHILS # BLD AUTO: 3.41 X10(3) UL (ref 1.5–7.7)
NEUTROPHILS NFR BLD AUTO: 52 %
OSMOLALITY SERPL CALC.SUM OF ELEC: 268 MOSM/KG (ref 275–295)
P AXIS: 45 DEGREES
P-R INTERVAL: 146 MS
PLATELET # BLD AUTO: 265 10(3)UL (ref 150–450)
POTASSIUM SERPL-SCNC: 3.9 MMOL/L (ref 3.5–5.1)
PROT SERPL-MCNC: 7.1 G/DL (ref 5.7–8.2)
PROTHROMBIN TIME: 14.1 SECONDS (ref 11.6–14.8)
Q-T INTERVAL: 476 MS
QRS DURATION: 124 MS
QTC CALCULATION (BEZET): 416 MS
R AXIS: 41 DEGREES
RBC # BLD AUTO: 3.81 X10(6)UL
RH BLOOD TYPE: POSITIVE
SODIUM SERPL-SCNC: 130 MMOL/L (ref 136–145)
T AXIS: 26 DEGREES
VENTRICULAR RATE: 46 BPM
WBC # BLD AUTO: 6.6 X10(3) UL (ref 4–11)

## 2024-10-28 PROCEDURE — 86850 RBC ANTIBODY SCREEN: CPT

## 2024-10-28 PROCEDURE — 86803 HEPATITIS C AB TEST: CPT

## 2024-10-28 PROCEDURE — 36415 COLL VENOUS BLD VENIPUNCTURE: CPT

## 2024-10-28 PROCEDURE — 80053 COMPREHEN METABOLIC PANEL: CPT

## 2024-10-28 PROCEDURE — 86901 BLOOD TYPING SEROLOGIC RH(D): CPT

## 2024-10-28 PROCEDURE — 86900 BLOOD TYPING SEROLOGIC ABO: CPT

## 2024-10-28 PROCEDURE — 87081 CULTURE SCREEN ONLY: CPT

## 2024-10-28 PROCEDURE — 85025 COMPLETE CBC W/AUTO DIFF WBC: CPT

## 2024-10-28 PROCEDURE — 85610 PROTHROMBIN TIME: CPT

## 2024-10-28 PROCEDURE — 85730 THROMBOPLASTIN TIME PARTIAL: CPT

## 2024-10-28 NOTE — H&P (VIEW-ONLY)
Carlota Martinez is a 79 year old female.    Chief Complaint   Patient presents with    Pre-Op Exam     Room 3, [facility], pt is here for pre surgical visit, L knee replacement on 11/15/2024       HPI:     Pleasant patient with h/o prothrombin gene mutations, h/o PE and DVT in 2016, HTN, small fiber neuropathy here for pre op exam for left total knee arthroplasty on 11/15/2024 requested by Dr. Villanueva.  Patient stays active despite the knee pain. She denies exertional chest pain or SOB with climbing flight of stairs. She is on chronic anticoagulation with Eliquis for unprovoked PE and DVT. ROS negative for f/c/cough/SOB/abdominal pain/n/v/weakness. She took covid 19 booster and flu vaccine on 9/30/24.    Patient Active Problem List   Diagnosis    Benign essential HTN    Prothrombin gene mutation (HCC)    Chronic anticoagulation    History of pulmonary embolus (PE)    Numbness of feet    Pre-diabetes    Encounter for anticoagulation discussion and counseling    Paresthesia of both feet    Small fiber neuropathy     Current Outpatient Medications   Medication Sig Dispense Refill    Acetaminophen ER (TYLENOL 8 HOUR ARTHRITIS PAIN) 650 MG Oral Tab CR Take 1 tablet (650 mg total) by mouth every 8 (eight) hours as needed for Pain.      Misc Natural Products (GLUCOSAMINE CHOND CMP ADVANCED OR) Take by mouth.      Melatonin 10 MG Oral Cap Take by mouth at bedtime.      Turmeric (QC TUMERIC COMPLEX) 500 MG Oral Cap Take 250 mg by mouth in the morning and 250 mg before bedtime.      Alpha-Lipoic Acid 200 MG Oral Tab Take by mouth 2 (two) times a day.      vitamin B-12 50 MCG Oral Tab Take 1 tablet (50 mcg total) by mouth daily.      AMLODIPINE 5 MG Oral Tab TAKE 1 TABLET BY MOUTH DAILY 90 tablet 3    METOPROLOL TARTRATE 50 MG Oral Tab TAKE ONE-HALF TABLET BY MOUTH  TWICE DAILY 90 tablet 3    MONTELUKAST 10 MG Oral Tab TAKE 1 TABLET BY MOUTH DAILY AS  NEEDED (Patient taking differently: Take 1 tablet (10 mg total) by mouth  nightly.) 90 tablet 3    GABAPENTIN 100 MG Oral Cap TAKE 1 CAPSULE(100 MG) BY MOUTH TWICE DAILY 60 capsule 0    apixaban (ELIQUIS) 2.5 MG Oral Tab Take 1 tablet (2.5 mg total) by mouth 2 (two) times daily. 180 tablet 3    Neomycin-Polymyxin-HC 1 % Otic Solution 4 drops to affected ear TID for 10 days, may repeat course as directed 30 mL 1    Glucosamine-Chondroitin (GLUCOSAMINE CHONDR COMPLEX OR) Take by mouth 2 (two) times a day.      Biotin w/ Vitamins C & E (HAIR/SKIN/NAILS OR) Take by mouth daily.      Vitamin D3, Cholecalciferol, 1000 UNITS Oral Cap Take 1 capsule (1,000 Units total) by mouth 2 (two) times daily.      Multiple Vitamins-Minerals (SENTRY SENIOR) Oral Tab Take by mouth daily.        OCUVITE Oral Tab Take 1 tablet by mouth daily as needed.        Past Medical History:    Blood disorder    DVT, bilateral lower limbs (HCC)    Essential hypertension    High blood pressure    History of DVT of lower extremity    Prediabetes    Pulmonary embolism (HCC)    Pulmonary embolism with acute cor pulmonale (HCC)    Visual impairment    contacts and glasses      Social History:  Social History     Socioeconomic History    Marital status:    Tobacco Use    Smoking status: Never    Smokeless tobacco: Never    Tobacco comments:     Updated 24   Vaping Use    Vaping status: Never Used   Substance and Sexual Activity    Alcohol use: Not Currently     Alcohol/week: 0.0 standard drinks of alcohol    Drug use: No    Sexual activity: Yes     Partners: Male   Other Topics Concern    Caffeine Concern Yes     Comment: coffee    Exercise No   Social History Narrative    . Her   of lung cancer, he was a smoker. She has 1 child and 2 grandchildren. Carlota worked in [employer], retired 2019.      Social Drivers of Health      [facility]    Social Connections    [facility]     Housing Stability     Family History   Problem Relation Age of Onset    Cancer Father 90        prostate     Heart Disorder Father     High Blood Pressure Father     Other (Other) Father     Cancer Mother         lung, was a smoker    Heart Disorder Mother     High Blood Pressure Mother     Other (Other) Sister     Asthma Paternal Grandfather     Cancer Maternal Grandfather     Heart Disorder Maternal Grandmother     Diabetes Sister     Fibromyalgia Sister     Breast Cancer Maternal Cousin Female 60    Clotting Disorder Neg         Allergies  Allergies[1]      REVIEW OF SYSTEMS:   GENERAL HEALTH:  no fevers   RESPIRATORY: no cough  CARDIOVASCULAR: denies chest pain  GI: denies abdominal pain  : no dysuria  NEURO: denies headaches  PSYCH: No reported depression   HEME: No adenopathy      EXAM:   /72 (BP Location: Left arm, Patient Position: Sitting, Cuff Size: adult)   Pulse 57   Resp 16   Ht 5' 6\" (1.676 m)   Wt 162 lb 12.8 oz (73.8 kg)   LMP  (LMP Unknown)   SpO2 97%   BMI 26.28 kg/m²   GENERAL: well developed, well nourished,in no apparent distress  HEENT: atraumatic, normocephalic  NECK: supple,no adenopathy  LUNGS: normal rate without respiratory distress, lungs clear to auscultation  CARDIO: RRR nl S1 S2  GI: normal bowel sounds, soft, NT/ND  EXTREMITIES: left knee with brace on, no LE edema  NEURO: Alert and oriented    EKG- SB at 46 bpm, RBBB, no acute ST- T wav changes    ASSESSMENT AND PLAN:     Encounter Diagnoses   Name     Pre-op examination- Patient's chronic medical problems are stable. She will be cleared for left TKA on 11/15/24 pending review of pre op labs. Please call if any questions.     Benign essential HTN- controlled, CPM     Primary osteoarthritis of left knee- plan for L TKA on 11/15/24 by Dr. Villanueva     History of pulmonary embolus (PE) and DVT in 2016- no acute issues, on chronic Eliquis per Dr. Alcazar. He recommends holding Eliquis 72 hours prior to procedure.      Prothrombin gene mutation (HCC)- as above     Pre-diabetes- pt watches diet, will monitor hgba1c     Small fiber neuropathy- continue gabapentin      Addendum- Pre op labs stable. She is medically cleared for L TKA.  Orders Placed This Encounter   Procedures    CBC W Differential W Platelet [E]    Comp Metabolic Panel (14)    Prothrombin Time (PT) [E]    PTT, Activated [E]    Type and screen    MSSA and MRSA Culture Screen       Meds & Refills for this Visit:  Requested Prescriptions      No prescriptions requested or ordered in this encounter       Imaging & Consults:  ELECTROCARDIOGRAM, COMPLETE    Return if symptoms worsen or fail to improve.  There are no Patient Instructions on file for this visit.      The patient indicates understanding of these issues and agrees to the plan.           [1]   Allergies  Allergen Reactions    Other UNKNOWN     Hay fever

## 2024-10-28 NOTE — PROGRESS NOTES
Carlota Martinez is a 79 year old female.    Chief Complaint   Patient presents with    Pre-Op Exam     Room 3, [facility], pt is here for pre surgical visit, L knee replacement on 11/15/2024       HPI:     Pleasant patient with h/o prothrombin gene mutations, h/o PE and DVT in 2016, HTN, small fiber neuropathy here for pre op exam for left total knee arthroplasty on 11/15/2024 requested by Dr. Villanueva.  Patient stays active despite the knee pain. She denies exertional chest pain or SOB with climbing flight of stairs. She is on chronic anticoagulation with Eliquis for unprovoked PE and DVT. ROS negative for f/c/cough/SOB/abdominal pain/n/v/weakness. She took covid 19 booster and flu vaccine on 9/30/24.    Patient Active Problem List   Diagnosis    Benign essential HTN    Prothrombin gene mutation (HCC)    Chronic anticoagulation    History of pulmonary embolus (PE)    Numbness of feet    Pre-diabetes    Encounter for anticoagulation discussion and counseling    Paresthesia of both feet    Small fiber neuropathy     Current Outpatient Medications   Medication Sig Dispense Refill    Acetaminophen ER (TYLENOL 8 HOUR ARTHRITIS PAIN) 650 MG Oral Tab CR Take 1 tablet (650 mg total) by mouth every 8 (eight) hours as needed for Pain.      Misc Natural Products (GLUCOSAMINE CHOND CMP ADVANCED OR) Take by mouth.      Melatonin 10 MG Oral Cap Take by mouth at bedtime.      Turmeric (QC TUMERIC COMPLEX) 500 MG Oral Cap Take 250 mg by mouth in the morning and 250 mg before bedtime.      Alpha-Lipoic Acid 200 MG Oral Tab Take by mouth 2 (two) times a day.      vitamin B-12 50 MCG Oral Tab Take 1 tablet (50 mcg total) by mouth daily.      AMLODIPINE 5 MG Oral Tab TAKE 1 TABLET BY MOUTH DAILY 90 tablet 3    METOPROLOL TARTRATE 50 MG Oral Tab TAKE ONE-HALF TABLET BY MOUTH  TWICE DAILY 90 tablet 3    MONTELUKAST 10 MG Oral Tab TAKE 1 TABLET BY MOUTH DAILY AS  NEEDED (Patient taking differently: Take 1 tablet (10 mg total) by mouth  nightly.) 90 tablet 3    GABAPENTIN 100 MG Oral Cap TAKE 1 CAPSULE(100 MG) BY MOUTH TWICE DAILY 60 capsule 0    apixaban (ELIQUIS) 2.5 MG Oral Tab Take 1 tablet (2.5 mg total) by mouth 2 (two) times daily. 180 tablet 3    Neomycin-Polymyxin-HC 1 % Otic Solution 4 drops to affected ear TID for 10 days, may repeat course as directed 30 mL 1    Glucosamine-Chondroitin (GLUCOSAMINE CHONDR COMPLEX OR) Take by mouth 2 (two) times a day.      Biotin w/ Vitamins C & E (HAIR/SKIN/NAILS OR) Take by mouth daily.      Vitamin D3, Cholecalciferol, 1000 UNITS Oral Cap Take 1 capsule (1,000 Units total) by mouth 2 (two) times daily.      Multiple Vitamins-Minerals (SENTRY SENIOR) Oral Tab Take by mouth daily.        OCUVITE Oral Tab Take 1 tablet by mouth daily as needed.        Past Medical History:    Blood disorder    DVT, bilateral lower limbs (HCC)    Essential hypertension    High blood pressure    History of DVT of lower extremity    Prediabetes    Pulmonary embolism (HCC)    Pulmonary embolism with acute cor pulmonale (HCC)    Visual impairment    contacts and glasses      Social History:  Social History     Socioeconomic History    Marital status:    Tobacco Use    Smoking status: Never    Smokeless tobacco: Never    Tobacco comments:     Updated 24   Vaping Use    Vaping status: Never Used   Substance and Sexual Activity    Alcohol use: Not Currently     Alcohol/week: 0.0 standard drinks of alcohol    Drug use: No    Sexual activity: Yes     Partners: Male   Other Topics Concern    Caffeine Concern Yes     Comment: coffee    Exercise No   Social History Narrative    . Her   of lung cancer, he was a smoker. She has 1 child and 2 grandchildren. Carlota worked in [employer], retired 2019.      Social Drivers of Health      [facility]    Social Connections    [facility]     Housing Stability     Family History   Problem Relation Age of Onset    Cancer Father 90        prostate     Heart Disorder Father     High Blood Pressure Father     Other (Other) Father     Cancer Mother         lung, was a smoker    Heart Disorder Mother     High Blood Pressure Mother     Other (Other) Sister     Asthma Paternal Grandfather     Cancer Maternal Grandfather     Heart Disorder Maternal Grandmother     Diabetes Sister     Fibromyalgia Sister     Breast Cancer Maternal Cousin Female 60    Clotting Disorder Neg         Allergies  Allergies[1]      REVIEW OF SYSTEMS:   GENERAL HEALTH:  no fevers   RESPIRATORY: no cough  CARDIOVASCULAR: denies chest pain  GI: denies abdominal pain  : no dysuria  NEURO: denies headaches  PSYCH: No reported depression   HEME: No adenopathy      EXAM:   /72 (BP Location: Left arm, Patient Position: Sitting, Cuff Size: adult)   Pulse 57   Resp 16   Ht 5' 6\" (1.676 m)   Wt 162 lb 12.8 oz (73.8 kg)   LMP  (LMP Unknown)   SpO2 97%   BMI 26.28 kg/m²   GENERAL: well developed, well nourished,in no apparent distress  HEENT: atraumatic, normocephalic  NECK: supple,no adenopathy  LUNGS: normal rate without respiratory distress, lungs clear to auscultation  CARDIO: RRR nl S1 S2  GI: normal bowel sounds, soft, NT/ND  EXTREMITIES: left knee with brace on, no LE edema  NEURO: Alert and oriented    EKG- SB at 46 bpm, RBBB, no acute ST- T wav changes    ASSESSMENT AND PLAN:     Encounter Diagnoses   Name     Pre-op examination- Patient's chronic medical problems are stable. She will be cleared for left TKA on 11/15/24 pending review of pre op labs. Please call if any questions.     Benign essential HTN- controlled, CPM     Primary osteoarthritis of left knee- plan for L TKA on 11/15/24 by Dr. Villanueva     History of pulmonary embolus (PE) and DVT in 2016- no acute issues, on chronic Eliquis per Dr. Alcazar. He recommends holding Eliquis 72 hours prior to procedure.      Prothrombin gene mutation (HCC)- as above     Pre-diabetes- pt watches diet, will monitor hgba1c     Small fiber neuropathy- continue gabapentin      Addendum- Pre op labs stable. She is medically cleared for L TKA.  Orders Placed This Encounter   Procedures    CBC W Differential W Platelet [E]    Comp Metabolic Panel (14)    Prothrombin Time (PT) [E]    PTT, Activated [E]    Type and screen    MSSA and MRSA Culture Screen       Meds & Refills for this Visit:  Requested Prescriptions      No prescriptions requested or ordered in this encounter       Imaging & Consults:  ELECTROCARDIOGRAM, COMPLETE    Return if symptoms worsen or fail to improve.  There are no Patient Instructions on file for this visit.      The patient indicates understanding of these issues and agrees to the plan.           [1]   Allergies  Allergen Reactions    Other UNKNOWN     Hay fever

## 2024-10-29 NOTE — TELEPHONE ENCOUNTER
PATIENT IS CLEARED BY PCP FOR SURGERY. OFFICE VISIT 10/28/2024 IN [facility]

## 2024-11-01 ENCOUNTER — PATIENT MESSAGE (OUTPATIENT)
Dept: NEUROLOGY | Facility: CLINIC | Age: 79
End: 2024-11-01

## 2024-11-04 ENCOUNTER — APPOINTMENT (OUTPATIENT)
Dept: HEMATOLOGY/ONCOLOGY | Facility: HOSPITAL | Age: 79
End: 2024-11-04
Attending: INTERNAL MEDICINE
Payer: MEDICARE

## 2024-11-08 NOTE — PROCEDURES
After informed consent, the patient's left hip was marked, prepped with topical antiseptic, and locally anesthetized with skin refrigerant followed by injection of 1% lidocaine to create a skin wheal anteriorly at an insertion site a few fingerbreadths lat no edema, regular rate and rhythm

## 2024-11-14 ENCOUNTER — ANESTHESIA EVENT (OUTPATIENT)
Dept: SURGERY | Facility: HOSPITAL | Age: 79
End: 2024-11-14
Payer: MEDICARE

## 2024-11-14 NOTE — ANESTHESIA PREPROCEDURE EVALUATION
PRE-OP EVALUATION    Patient Name: Carlota Martinez    Admit Diagnosis: Primary osteoarthritis of left knee [M17.12]    Pre-op Diagnosis: Primary osteoarthritis of left knee [M17.12]    Left total knee arthroplasty    Anesthesia Procedure: Left total knee arthroplasty (Left)    Surgeons and Role:     * Garrison Villanueva MD - Primary    Pre-op vitals reviewed.  Temp: 97.3 °F (36.3 °C)  Pulse: 51  Resp: 16  BP: 131/71  SpO2: 100 %  Body mass index is 25.99 kg/m².    Current medications reviewed.  Hospital Medications:   acetaminophen (Tylenol Extra Strength) tab 1,000 mg  1,000 mg Oral Once    lactated ringers infusion   Intravenous Continuous    tranexamic acid in sodium chloride 0.7% (Cyklokapron) 1000 mg/100mL infusion premix 1,000 mg  1,000 mg Intravenous Once    ceFAZolin (Ancef) 2g in 10mL IV syringe premix  2 g Intravenous Once    clonidine/epinephrine/ropivacaine/ketorolac in 0.9% NaCl 60 mL pain cocktail syringe for knee arthroplasty   Infiltration Once (Intra-Op)    povidone-iodine (Betadine) 10 % 17.5 mL in sodium chloride 0.9 % 500 mL irrigation   Irrigation Once (Intra-Op)       Outpatient Medications:   Prescriptions Prior to Admission[1]    Allergies: Other      Anesthesia Evaluation    Patient summary reviewed.    Anesthetic Complications  (-) history of anesthetic complications         GI/Hepatic/Renal                                 Cardiovascular  Comment: Patient denies hx of chest pain/tightness, MI, or cardiac disease.      Hx of PE/DVT    EKG  Sinus bradycardia  Right bundle branch block  Abnormal ECG  When compared with ECG of 12-MAR-2016 07:50,  Vent. rate has decreased BY  53 BPM  Right bundle branch block has replaced Incomplete right bundle branch block  Confirmed by Elodia Brock ([id number]) on 10/28/2024 1:00:29 PM        ECG reviewed.  Exercise tolerance: good     MET: >4      (+) hypertension                       (-) angina     (-) MCCURDY         Endo/Other                                   Pulmonary      (-) asthma         (-) shortness of breath     (-) sleep apnea       Neuro/Psych                              Last Eliquis at 17:00 on 11-11-24 so off >72 hrs        Past Surgical History:   Procedure Laterality Date    Colonoscopy      Colonoscopy N/A 11/11/2021    Procedure: COLONOSCOPY with biopsy and forcep polypectomy;  Surgeon: Benjamín Diehl MD;  Location:  ENDOSCOPY    Other surgical history  3/12/16    catheters inserted into lungs     Social History     Socioeconomic History    Marital status:    Tobacco Use    Smoking status: Never    Smokeless tobacco: Never    Tobacco comments:     Updated 5/22/24   Vaping Use    Vaping status: Never Used   Substance and Sexual Activity    Alcohol use: Not Currently     Alcohol/week: 0.0 standard drinks of alcohol    Drug use: No    Sexual activity: Yes     Partners: Male   Other Topics Concern    Caffeine Concern Yes     Comment: coffee    Exercise No     History   Drug Use No     Available pre-op labs reviewed.  Lab Results   Component Value Date    WBC 6.6 10/28/2024    RBC 3.81 10/28/2024    HGB 12.5 10/28/2024    HCT 36.6 10/28/2024    MCV 96.1 10/28/2024    MCH 32.8 10/28/2024    MCHC 34.2 10/28/2024    RDW 12.5 10/28/2024    .0 10/28/2024     Lab Results   Component Value Date     (L) 10/28/2024    K 3.9 10/28/2024    CL 98 10/28/2024    CO2 28.0 10/28/2024    BUN 12 10/28/2024    CREATSERUM 0.66 10/28/2024    GLU 74 10/28/2024    CA 9.7 10/28/2024     Lab Results   Component Value Date    INR 1.08 10/28/2024         Airway      Mallampati: III  Mouth opening: >3 FB  TM distance: 4 - 6 cm  Neck ROM: full Cardiovascular      Rhythm: regular  Rate: normal     Dental    Dentition appears grossly intact         Pulmonary    Pulmonary exam normal.  Breath sounds clear to auscultation bilaterally.               Other findings              ASA: 2   Plan: spinal  NPO status verified and patient meets  guidelines.    Post-procedure pain management plan discussed with surgeon and patient.  Surgeon requests: regional block  Comment: Discussed left adductor canal block    Plan/risks discussed with: patient                Present on Admission:  **None**             [1]   Medications Prior to Admission   Medication Sig Dispense Refill Last Dose/Taking    Multiple Vitamins-Minerals (HAIR SKIN & NAILS OR) Take 1 tablet by mouth in the morning and 1 tablet before bedtime.   11/1/2024    gabapentin 100 MG Oral Cap Take 1 capsule (100 mg total) by mouth 2 (two) times daily. 20 capsule 0 11/14/2024 at  7:00 PM    Acetaminophen ER (TYLENOL 8 HOUR ARTHRITIS PAIN) 650 MG Oral Tab CR Take 1 tablet (650 mg total) by mouth every 8 (eight) hours as needed for Pain.   Past Week    Turmeric (QC TUMERIC COMPLEX) 500 MG Oral Cap Take 250 mg by mouth in the morning and 250 mg before bedtime.   11/1/2024    Alpha-Lipoic Acid 200 MG Oral Tab Take 1 tablet by mouth in the morning and 1 tablet before bedtime.   11/1/2024    cyanocobalamin 500 MCG Oral Tab Take 1 tablet (500 mcg total) by mouth every morning.   11/1/2024    AMLODIPINE 5 MG Oral Tab TAKE 1 TABLET BY MOUTH DAILY 90 tablet 3 11/14/2024    METOPROLOL TARTRATE 50 MG Oral Tab TAKE ONE-HALF TABLET BY MOUTH  TWICE DAILY 90 tablet 3 11/15/2024 at  3:00 AM    MONTELUKAST 10 MG Oral Tab TAKE 1 TABLET BY MOUTH DAILY AS  NEEDED 90 tablet 3 11/14/2024 at  7:00 PM    apixaban (ELIQUIS) 2.5 MG Oral Tab Take 1 tablet (2.5 mg total) by mouth 2 (two) times daily. 180 tablet 3 11/12/2024    Glucosamine-Chondroitin (GLUCOSAMINE CHONDR COMPLEX OR) Take 1 tablet by mouth in the morning and 1 tablet before bedtime.   11/1/2024    Vitamin D3, Cholecalciferol, 1000 UNITS Oral Cap Take 2 capsules (2,000 Units total) by mouth daily.   11/1/2024    Multiple Vitamins-Minerals (SENTRY SENIOR) Oral Tab Take 1 tablet by mouth in the morning and 1 tablet before bedtime.   11/1/2024    OCUVITE Oral Tab Take 1  tablet by mouth in the morning and 1 tablet before bedtime.   11/1/2024    Neomycin-Polymyxin-HC 1 % Otic Solution 4 drops to affected ear TID for 10 days, may repeat course as directed 30 mL 1 More than a month

## 2024-11-15 ENCOUNTER — APPOINTMENT (OUTPATIENT)
Dept: GENERAL RADIOLOGY | Facility: HOSPITAL | Age: 79
End: 2024-11-15
Attending: PHYSICIAN ASSISTANT
Payer: MEDICARE

## 2024-11-15 ENCOUNTER — HOSPITAL ENCOUNTER (INPATIENT)
Facility: HOSPITAL | Age: 79
LOS: 1 days | Discharge: HOME HEALTH | End: 2024-11-16
Attending: ORTHOPAEDIC SURGERY | Admitting: ORTHOPAEDIC SURGERY
Payer: MEDICARE

## 2024-11-15 ENCOUNTER — ANESTHESIA (OUTPATIENT)
Dept: SURGERY | Facility: HOSPITAL | Age: 79
End: 2024-11-15
Payer: MEDICARE

## 2024-11-15 DIAGNOSIS — M17.12 OSTEOARTHRITIS OF LEFT KNEE: Primary | ICD-10-CM

## 2024-11-15 DIAGNOSIS — M17.12 PRIMARY OSTEOARTHRITIS OF LEFT KNEE: ICD-10-CM

## 2024-11-15 DIAGNOSIS — I26.09 OTHER PULMONARY EMBOLISM WITH ACUTE COR PULMONALE, UNSPECIFIED CHRONICITY (HCC): ICD-10-CM

## 2024-11-15 PROCEDURE — 0SRD0J9 REPLACEMENT OF LEFT KNEE JOINT WITH SYNTHETIC SUBSTITUTE, CEMENTED, OPEN APPROACH: ICD-10-PCS | Performed by: ORTHOPAEDIC SURGERY

## 2024-11-15 PROCEDURE — 73560 X-RAY EXAM OF KNEE 1 OR 2: CPT | Performed by: PHYSICIAN ASSISTANT

## 2024-11-15 PROCEDURE — 3E0T3BZ INTRODUCTION OF ANESTHETIC AGENT INTO PERIPHERAL NERVES AND PLEXI, PERCUTANEOUS APPROACH: ICD-10-PCS | Performed by: ANESTHESIOLOGY

## 2024-11-15 PROCEDURE — 27447 TOTAL KNEE ARTHROPLASTY: CPT | Performed by: ORTHOPAEDIC SURGERY

## 2024-11-15 PROCEDURE — 76942 ECHO GUIDE FOR BIOPSY: CPT | Performed by: ANESTHESIOLOGY

## 2024-11-15 PROCEDURE — 99223 1ST HOSP IP/OBS HIGH 75: CPT | Performed by: INTERNAL MEDICINE

## 2024-11-15 PROCEDURE — 27447 TOTAL KNEE ARTHROPLASTY: CPT | Performed by: PHYSICIAN ASSISTANT

## 2024-11-15 DEVICE — TIBIAL INSERT FIXED SPHERE FLEX   #3/11 MM L E-CROSS
Type: IMPLANTABLE DEVICE | Site: KNEE | Status: FUNCTIONAL
Brand: GMK SPHERE TOTAL KNEE SYSTEM

## 2024-11-15 DEVICE — FEMUR SPHERE CEMENTED LEFT, SIZE 3+
Type: IMPLANTABLE DEVICE | Site: KNEE | Status: FUNCTIONAL
Brand: GMK SPHERE TOTAL KNEE SYSTEM

## 2024-11-15 DEVICE — GMK SPHERE KNEE: Type: IMPLANTABLE DEVICE

## 2024-11-15 DEVICE — FIXED TIBIAL TRAY CEMENTED LEFT, SIZE 3
Type: IMPLANTABLE DEVICE | Site: KNEE | Status: FUNCTIONAL
Brand: GMK PRIMARY TOTAL KNEE SYSTEM

## 2024-11-15 DEVICE — IMPLANTABLE DEVICE
Type: IMPLANTABLE DEVICE | Site: KNEE | Status: FUNCTIONAL
Brand: REFOBACIN® BONE CEMENT R

## 2024-11-15 RX ORDER — NALOXONE HYDROCHLORIDE 0.4 MG/ML
0.08 INJECTION, SOLUTION INTRAMUSCULAR; INTRAVENOUS; SUBCUTANEOUS AS NEEDED
Status: DISCONTINUED | OUTPATIENT
Start: 2024-11-15 | End: 2024-11-15 | Stop reason: HOSPADM

## 2024-11-15 RX ORDER — HYDROMORPHONE HYDROCHLORIDE 1 MG/ML
0.4 INJECTION, SOLUTION INTRAMUSCULAR; INTRAVENOUS; SUBCUTANEOUS EVERY 2 HOUR PRN
Status: DISCONTINUED | OUTPATIENT
Start: 2024-11-15 | End: 2024-11-16

## 2024-11-15 RX ORDER — CEPHALEXIN 500 MG/1
500 CAPSULE ORAL EVERY 8 HOURS SCHEDULED
Status: DISCONTINUED | OUTPATIENT
Start: 2024-11-16 | End: 2024-11-16

## 2024-11-15 RX ORDER — ONDANSETRON 2 MG/ML
4 INJECTION INTRAMUSCULAR; INTRAVENOUS EVERY 6 HOURS PRN
Status: DISCONTINUED | OUTPATIENT
Start: 2024-11-15 | End: 2024-11-15 | Stop reason: HOSPADM

## 2024-11-15 RX ORDER — ONDANSETRON 2 MG/ML
INJECTION INTRAMUSCULAR; INTRAVENOUS AS NEEDED
Status: DISCONTINUED | OUTPATIENT
Start: 2024-11-15 | End: 2024-11-15 | Stop reason: SURG

## 2024-11-15 RX ORDER — FERROUS SULFATE 325(65) MG
325 TABLET, DELAYED RELEASE (ENTERIC COATED) ORAL
Status: DISCONTINUED | OUTPATIENT
Start: 2024-11-15 | End: 2024-11-16

## 2024-11-15 RX ORDER — ACETAMINOPHEN 500 MG
1000 TABLET ORAL ONCE
Status: DISCONTINUED | OUTPATIENT
Start: 2024-11-15 | End: 2024-11-15 | Stop reason: HOSPADM

## 2024-11-15 RX ORDER — ACETAMINOPHEN 325 MG/1
650 TABLET ORAL ONCE
Status: COMPLETED | OUTPATIENT
Start: 2024-11-15 | End: 2024-11-15

## 2024-11-15 RX ORDER — MONTELUKAST SODIUM 10 MG/1
10 TABLET ORAL DAILY PRN
Status: DISCONTINUED | OUTPATIENT
Start: 2024-11-15 | End: 2024-11-16

## 2024-11-15 RX ORDER — DIPHENHYDRAMINE HYDROCHLORIDE 50 MG/ML
12.5 INJECTION INTRAMUSCULAR; INTRAVENOUS EVERY 4 HOURS PRN
Status: DISCONTINUED | OUTPATIENT
Start: 2024-11-15 | End: 2024-11-16

## 2024-11-15 RX ORDER — HYDROMORPHONE HYDROCHLORIDE 1 MG/ML
0.2 INJECTION, SOLUTION INTRAMUSCULAR; INTRAVENOUS; SUBCUTANEOUS EVERY 5 MIN PRN
Status: DISCONTINUED | OUTPATIENT
Start: 2024-11-15 | End: 2024-11-15 | Stop reason: HOSPADM

## 2024-11-15 RX ORDER — POLYETHYLENE GLYCOL 3350 17 G/17G
17 POWDER, FOR SOLUTION ORAL DAILY PRN
Status: DISCONTINUED | OUTPATIENT
Start: 2024-11-15 | End: 2024-11-16

## 2024-11-15 RX ORDER — HYDROMORPHONE HYDROCHLORIDE 1 MG/ML
0.4 INJECTION, SOLUTION INTRAMUSCULAR; INTRAVENOUS; SUBCUTANEOUS EVERY 5 MIN PRN
Status: DISCONTINUED | OUTPATIENT
Start: 2024-11-15 | End: 2024-11-15 | Stop reason: HOSPADM

## 2024-11-15 RX ORDER — OXYCODONE HYDROCHLORIDE 5 MG/1
5 TABLET ORAL EVERY 4 HOURS PRN
Status: DISCONTINUED | OUTPATIENT
Start: 2024-11-15 | End: 2024-11-16

## 2024-11-15 RX ORDER — ROPIVACAINE HYDROCHLORIDE 5 MG/ML
INJECTION, SOLUTION EPIDURAL; INFILTRATION; PERINEURAL AS NEEDED
Status: DISCONTINUED | OUTPATIENT
Start: 2024-11-15 | End: 2024-11-15 | Stop reason: SURG

## 2024-11-15 RX ORDER — KETAMINE HYDROCHLORIDE 50 MG/ML
INJECTION, SOLUTION INTRAMUSCULAR; INTRAVENOUS AS NEEDED
Status: DISCONTINUED | OUTPATIENT
Start: 2024-11-15 | End: 2024-11-15 | Stop reason: SURG

## 2024-11-15 RX ORDER — DIPHENHYDRAMINE HCL 25 MG
25 CAPSULE ORAL EVERY 4 HOURS PRN
Status: DISCONTINUED | OUTPATIENT
Start: 2024-11-15 | End: 2024-11-16

## 2024-11-15 RX ORDER — HYDROMORPHONE HYDROCHLORIDE 1 MG/ML
0.6 INJECTION, SOLUTION INTRAMUSCULAR; INTRAVENOUS; SUBCUTANEOUS EVERY 5 MIN PRN
Status: DISCONTINUED | OUTPATIENT
Start: 2024-11-15 | End: 2024-11-15 | Stop reason: HOSPADM

## 2024-11-15 RX ORDER — METOCLOPRAMIDE HYDROCHLORIDE 5 MG/ML
10 INJECTION INTRAMUSCULAR; INTRAVENOUS EVERY 8 HOURS PRN
Status: DISCONTINUED | OUTPATIENT
Start: 2024-11-15 | End: 2024-11-16

## 2024-11-15 RX ORDER — PHENYLEPHRINE HCL 10 MG/ML
VIAL (ML) INJECTION AS NEEDED
Status: DISCONTINUED | OUTPATIENT
Start: 2024-11-15 | End: 2024-11-15 | Stop reason: SURG

## 2024-11-15 RX ORDER — SODIUM PHOSPHATE, DIBASIC AND SODIUM PHOSPHATE, MONOBASIC 7; 19 G/230ML; G/230ML
1 ENEMA RECTAL ONCE AS NEEDED
Status: DISCONTINUED | OUTPATIENT
Start: 2024-11-15 | End: 2024-11-16

## 2024-11-15 RX ORDER — DEXAMETHASONE SODIUM PHOSPHATE 10 MG/ML
8 INJECTION, SOLUTION INTRAMUSCULAR; INTRAVENOUS ONCE
Status: COMPLETED | OUTPATIENT
Start: 2024-11-16 | End: 2024-11-16

## 2024-11-15 RX ORDER — SENNOSIDES 8.6 MG
17.2 TABLET ORAL NIGHTLY
Status: DISCONTINUED | OUTPATIENT
Start: 2024-11-15 | End: 2024-11-16

## 2024-11-15 RX ORDER — SODIUM CHLORIDE, SODIUM LACTATE, POTASSIUM CHLORIDE, CALCIUM CHLORIDE 600; 310; 30; 20 MG/100ML; MG/100ML; MG/100ML; MG/100ML
INJECTION, SOLUTION INTRAVENOUS CONTINUOUS
Status: DISCONTINUED | OUTPATIENT
Start: 2024-11-15 | End: 2024-11-15 | Stop reason: HOSPADM

## 2024-11-15 RX ORDER — ENOXAPARIN SODIUM 100 MG/ML
40 INJECTION SUBCUTANEOUS DAILY
Status: DISCONTINUED | OUTPATIENT
Start: 2024-11-15 | End: 2024-11-16

## 2024-11-15 RX ORDER — SODIUM CHLORIDE, SODIUM LACTATE, POTASSIUM CHLORIDE, CALCIUM CHLORIDE 600; 310; 30; 20 MG/100ML; MG/100ML; MG/100ML; MG/100ML
INJECTION, SOLUTION INTRAVENOUS CONTINUOUS
Status: DISCONTINUED | OUTPATIENT
Start: 2024-11-15 | End: 2024-11-16

## 2024-11-15 RX ORDER — TRANEXAMIC ACID 10 MG/ML
1000 INJECTION, SOLUTION INTRAVENOUS ONCE
Status: COMPLETED | OUTPATIENT
Start: 2024-11-15 | End: 2024-11-15

## 2024-11-15 RX ORDER — DOCUSATE SODIUM 100 MG/1
100 CAPSULE, LIQUID FILLED ORAL 2 TIMES DAILY
Status: DISCONTINUED | OUTPATIENT
Start: 2024-11-15 | End: 2024-11-16

## 2024-11-15 RX ORDER — MIDAZOLAM HYDROCHLORIDE 1 MG/ML
INJECTION INTRAMUSCULAR; INTRAVENOUS AS NEEDED
Status: DISCONTINUED | OUTPATIENT
Start: 2024-11-15 | End: 2024-11-15 | Stop reason: SURG

## 2024-11-15 RX ORDER — BISACODYL 10 MG
10 SUPPOSITORY, RECTAL RECTAL
Status: DISCONTINUED | OUTPATIENT
Start: 2024-11-15 | End: 2024-11-16

## 2024-11-15 RX ORDER — DIPHENHYDRAMINE HYDROCHLORIDE 50 MG/ML
25 INJECTION INTRAMUSCULAR; INTRAVENOUS ONCE AS NEEDED
Status: ACTIVE | OUTPATIENT
Start: 2024-11-15 | End: 2024-11-15

## 2024-11-15 RX ORDER — GABAPENTIN 100 MG/1
100 CAPSULE ORAL 2 TIMES DAILY
Status: DISCONTINUED | OUTPATIENT
Start: 2024-11-15 | End: 2024-11-16

## 2024-11-15 RX ORDER — DEXAMETHASONE SODIUM PHOSPHATE 10 MG/ML
INJECTION, SOLUTION INTRAMUSCULAR; INTRAVENOUS AS NEEDED
Status: DISCONTINUED | OUTPATIENT
Start: 2024-11-15 | End: 2024-11-15 | Stop reason: SURG

## 2024-11-15 RX ORDER — EPHEDRINE SULFATE 50 MG/ML
INJECTION INTRAVENOUS AS NEEDED
Status: DISCONTINUED | OUTPATIENT
Start: 2024-11-15 | End: 2024-11-15 | Stop reason: SURG

## 2024-11-15 RX ORDER — METOPROLOL TARTRATE 25 MG/1
25 TABLET, FILM COATED ORAL
Status: DISCONTINUED | OUTPATIENT
Start: 2024-11-15 | End: 2024-11-16

## 2024-11-15 RX ORDER — ACETAMINOPHEN 325 MG/1
TABLET ORAL
Status: COMPLETED
Start: 2024-11-15 | End: 2024-11-15

## 2024-11-15 RX ORDER — HYDROMORPHONE HYDROCHLORIDE 1 MG/ML
0.2 INJECTION, SOLUTION INTRAMUSCULAR; INTRAVENOUS; SUBCUTANEOUS EVERY 2 HOUR PRN
Status: DISCONTINUED | OUTPATIENT
Start: 2024-11-15 | End: 2024-11-16

## 2024-11-15 RX ORDER — AMLODIPINE BESYLATE 5 MG/1
5 TABLET ORAL DAILY
Status: DISCONTINUED | OUTPATIENT
Start: 2024-11-15 | End: 2024-11-16

## 2024-11-15 RX ORDER — ONDANSETRON 2 MG/ML
4 INJECTION INTRAMUSCULAR; INTRAVENOUS EVERY 6 HOURS PRN
Status: DISCONTINUED | OUTPATIENT
Start: 2024-11-15 | End: 2024-11-16

## 2024-11-15 RX ORDER — DEXAMETHASONE SODIUM PHOSPHATE 4 MG/ML
VIAL (ML) INJECTION AS NEEDED
Status: DISCONTINUED | OUTPATIENT
Start: 2024-11-15 | End: 2024-11-15 | Stop reason: SURG

## 2024-11-15 RX ADMIN — EPHEDRINE SULFATE 5 MG: 50 INJECTION INTRAVENOUS at 07:47:00

## 2024-11-15 RX ADMIN — MIDAZOLAM HYDROCHLORIDE 0.5 MG: 1 INJECTION INTRAMUSCULAR; INTRAVENOUS at 07:58:00

## 2024-11-15 RX ADMIN — EPHEDRINE SULFATE 5 MG: 50 INJECTION INTRAVENOUS at 08:32:00

## 2024-11-15 RX ADMIN — PHENYLEPHRINE HCL 50 MCG: 10 MG/ML VIAL (ML) INJECTION at 07:39:00

## 2024-11-15 RX ADMIN — EPHEDRINE SULFATE 5 MG: 50 INJECTION INTRAVENOUS at 08:18:00

## 2024-11-15 RX ADMIN — KETAMINE HYDROCHLORIDE 20 MG: 50 INJECTION, SOLUTION INTRAMUSCULAR; INTRAVENOUS at 09:03:00

## 2024-11-15 RX ADMIN — TRANEXAMIC ACID 1000 MG: 10 INJECTION, SOLUTION INTRAVENOUS at 07:38:00

## 2024-11-15 RX ADMIN — KETAMINE HYDROCHLORIDE 15 MG: 50 INJECTION, SOLUTION INTRAMUSCULAR; INTRAVENOUS at 07:48:00

## 2024-11-15 RX ADMIN — MIDAZOLAM HYDROCHLORIDE 1 MG: 1 INJECTION INTRAMUSCULAR; INTRAVENOUS at 07:04:00

## 2024-11-15 RX ADMIN — KETAMINE HYDROCHLORIDE 15 MG: 50 INJECTION, SOLUTION INTRAMUSCULAR; INTRAVENOUS at 08:35:00

## 2024-11-15 RX ADMIN — DEXAMETHASONE SODIUM PHOSPHATE 8 MG: 4 MG/ML VIAL (ML) INJECTION at 08:08:00

## 2024-11-15 RX ADMIN — ROPIVACAINE HYDROCHLORIDE 20 ML: 5 INJECTION, SOLUTION EPIDURAL; INFILTRATION; PERINEURAL at 07:29:00

## 2024-11-15 RX ADMIN — ONDANSETRON 4 MG: 2 INJECTION INTRAMUSCULAR; INTRAVENOUS at 08:09:00

## 2024-11-15 RX ADMIN — DEXAMETHASONE SODIUM PHOSPHATE 2 MG: 10 INJECTION, SOLUTION INTRAMUSCULAR; INTRAVENOUS at 07:29:00

## 2024-11-15 NOTE — INTERVAL H&P NOTE
Pre-op Diagnosis: Primary osteoarthritis of left knee [M17.12]    The above referenced H&P was reviewed by Garrison Villanueva MD on 11/15/2024, the patient was examined and no significant changes have occurred in the patient's condition since the H&P was performed.  I discussed with the patient and/or legal representative the potential benefits, risks and side effects of this procedure; the likelihood of the patient achieving goals; and potential problems that might occur during recuperation.  I discussed reasonable alternatives to the procedure, including risks, benefits and side effects related to the alternatives and risks related to not receiving this procedure.  We will proceed with procedure as planned.

## 2024-11-15 NOTE — OPERATIVE REPORT
Operative Note    Patient Name/: Carlota Martinez 1945  Date: 11/15/2024  Location: [facility]  Preoperative Diagnosis: Left knee osteoarthritis  Postoperative Diagnosis: Same  Operation: Left total knee arthroplasty mechanical alignment  Primary Surgeon: Garrison Villanueva  Assistant:  JAYLIN Rice first assist.  Paul MCDANIEL also assisted in this case. Please note a skilled surgical assistant was necessary for this case in order to assist with patient positioning, prepping, draping, incision, exposure, implant placement, wound closure.  Indications: 79-year-old female with end-stage left knee varus osteoarthritis  Surgical Findings: Varus OA  Operative Report:  After informed consent, the left lower extremity was marked.  Patient was brought to the operating room where spinal anesthesia was induced.  Preoperative exam demonstrated range of motion from full extension, gravity flexion 125-1 30. The left lower extremity was prepped and draped in sterile fashion.  Timeout was performed to verify correct surgical site and procedure.  2 g of Ancef was given within 1 hour of incision.  Additionally, 1 g tranexamic acid was given intravenously.  Next the limb was gravity exsanguinated and tourniquet inflated.  Incision was made anteriorly from 2 fingerbreadths proximal to the patella down along the medial aspect of the tibial tubercle.  This was carried down to the extensor mechanism.  Medial and lateral flaps were developed.  The VMO muscle belly was identified.  Full-thickness medial parapatellar arthrotomy was made from 2 fingerbreadths proximal to the patella along the VMO muscle belly, around the medial patella and patellar tendon.  The fat pad was dissected free from the patellar tendon and reflected medially.  Subperiosteal dissection was carried out posteriorly about the proximal medial aspect of the tibia.  Osteophytes were removed from the proximal medial tibia.  The lateral patellofemoral plica was  incised, and the patella was everted and denervated.  The synovium over the anterior distal femur was teed open.  Next the knee was flexed up, and remnant of the ACL and lateral meniscus were excised.  I used an intramedullary reamer to open up the femur for our intramedullary alignment guide.  The distal femoral cutting block was placed on the distal femur to take off 9.5 mm of bone.  It sat down on the distal medial femur, measuring to take off more medial bone than lateral.  This was pinned in place.  A lilli's wing was used to check the cut, and the cut was performed.  The distal medial femur fragment measured 9 mm.  Next the knee was flexed and the tibial crest was marked.  An extra medullary tibial cutting guide was placed above the ankle, measuring to take off a few millimeters off the medial side, pinned in line with the tibial crest in the coronal plane and with a few degrees of posterior slope in the sagittal plane.  The proximal tibia was cut, using a Z retractor to protect MCL, patellar tendon and lateral soft tissues.  The cut measured 1 medial, 9 lateral.  After this portion of bone was removed, the knee was brought into extension and a lamina  was used to open up the extension space.  What remained of the medial and lateral menisci were removed.  Spacer block was placed with a T-handle drop-down marino to verify appropriate cut angle.  Next the knee was flexed up, and the femoral sizing block with stylus was used.  This sized for a size 3+ femur.  Pins were placed, and the sizing block removed.  Pins were noted to be parallel to the epicondylar axis.  The 4-in-1 cutting block was placed and screwed down.  Anterior distal femur was cut, demonstrating a positive grand piano sign.  Posterior condyles were cut, measuring 7.5 on the posterior medial cut.  Chamfers were then cut.  The cutting block was removed, and the knee was flexed with a lamina  to open up the posterior knee joint.  Notch  contents and PCL were removed.  Posterior condylar recesses were opened, and posterior osteophytes chiseled out.  The knee was a touch tight on the medial side, therefore limited medial release was performed of the semimembranosus insertion into the most proximal aspect medially and posteriorly, as well as removal of small osteophytes.  The knee was brought into extension, and sized for a tibial baseplate.  Trials were then placed.  With the 3+ femur, the 3 tibia, and a 11 polyethylene liner, the knee came to full extension with excellent valgus stability, 1 mm varus laxity; in mid flexion, there was 3-4 varus laxity, less than half a millimeter valgus laxity, Lachman's stable; 90 degrees anterior drawer less than 5 mm rotating around the medial femoral condyle; Gravity flexion 130 with the patella tracking centrally.  The tibial component rotation was marked, and the tibial trial removed.  The femoral lugs were drilled, and the anterior chamfer finishing guide was placed to cut the finishing anterior chamfer cut.  The knee was then flexed, and the tibia brought forward to place the tibial baseplate trial.  This was pinned in place, and the drill and keel punch were used to prepare the proximal tibia.  The knee was then brought into extension, the patella everted, and it measured only 19 mm therefore was not resurfaced but rather circumferentially denervated and osteophytes were excised.  Next final components were opened, bony surfaces were washed and dried, and periarticular pain cocktail was injected with 30 mL in the posterior capsule.  Cement was then mixed.  The tibial component was cemented along with the proximal tibial bone surface, the component was impacted into place followed by removal of excess cement.  The femur was reduced onto the tibia, and cement was placed on the cut surface of the femur.  The cemented femoral component was then impacted down, with removal of excess cement.  The femur was left up  1 to 2 mm on the lateral side the knee was brought into full extension and an axial load was held across the joint.  The patella was cemented along with the button, and this was compressed and held in place.  Once cement had hardened, the knee was trialed again.  Optimal stability was achieved with a 11 mm polyethylene liner, with exam demonstrating excellent varus valgus stability in full extension; mid flexion 3 mm varus, less than half a millimeter valgus; 90 degrees anterior drawer less than 5 mm rotating around the medial femoral condyle; Gravity flexion 1 25-1 30 with the patella tracking centrally.  The trial poly was removed, the knee was washed out, any loose bodies removed, and final poly was placed.  Betadine lavage was performed.  Remaining pain cocktail was injected with 10 mL into remaining fat pad, 10 mL into medial femoral and tibial periosteum, and 10 mL into extensor mechanism.  Closure was performed with 5 Ethibond for the arthrotomy, 2-0 Vicryl for skin, followed by staples.  A sterile dressing was applied, and the patient was brought to PACU in good condition.  Anesthesia: Spinal plus adductor canal block  Complications: None  Specimens: None  Blood loss: 25 mL  Fluids: See anesthesia report  Implants: Medacta GM K sphere 3+ femur, 3 tibia, 11 poly-  Drains: None  Condition: Good  Disposition: Floor    -Weightbearing as tolerated, PT OT  -DVT prophylaxis mechanical plus Lovenox followed by transition to home Eliquis 1 week after surgery  -Pain control with oral meds  -Perioperative Ancef, 1 week Keflex postop    Garrison Villanueva MD, FAAOS  [facility]  Phone [phone]  Fax [phone]

## 2024-11-15 NOTE — PLAN OF CARE
Alert and oriented x 4. Vitals stable on room air. Pain managed on PO medications. Dressing to left knee clean, dry, intact. Voiding without difficulty. Last BM 11/14. Tolerating regular diet. Skin check with NAHED Shields, see flowsheets. SCD's on bilaterally. Safety precautions in place.

## 2024-11-15 NOTE — PHYSICAL THERAPY NOTE
PHYSICAL THERAPY JOINT EVALUATION - INPATIENT     Room Number: 376/376-A  Evaluation Date: 11/15/2024  Type of Evaluation: Initial  Physician Order: PT Eval and Treat    Presenting Problem: s/p L TKA  Co-Morbidities : DVT, PE  HTN, L knee OA  Reason for Therapy: Mobility Dysfunction and Discharge Planning    Operation 11/15/24 Dr Villanueva: Left total knee arthroplasty mechanical alignment     PHYSICAL THERAPY ASSESSMENT   Patient is a 79 year old female admitted 11/15/2024 for L TKA.  Prior to admission, patient's baseline is indep.  Patient is currently functioning near baseline with bed mobility, transfers, gait, stair negotiation, maintaining seated position, and standing prolonged periods.  Patient is requiring stand-by assist and contact guard assist as a result of the following impairments: decreased functional strength, decreased endurance/aerobic capacity, pain, impaired static/dynamic standing balance, impaired coordination, impaired motor planning, decreased muscular endurance, and limited LLE ROM.  Physical Therapy will continue to follow for duration of hospitalization.    Patient will benefit from continued skilled PT Services at discharge to promote prior level of function and safety with additional support and return home with home health PT.    PLAN DURING HOSPITALIZATION  Nursing Mobility Recommendation : 1 Assist  PT Device Recommendation: Rolling walker  PT Treatment Plan: Bed mobility;Body mechanics;Coordination;Endurance;Energy conservation;Patient education;Family education;Gait training;Neuromuscular re-educate;Range of motion;Stair training;Stoop training;Strengthening;Balance training;Transfer training  Rehab Potential : Good  Frequency (Obs): Daily     CURRENT GOALS  Goal #1  Patient is able to demonstrate supine - sit EOB @ level: Pretty     Goal #2  Patient is able to demonstrate transfers Sit to/from Stand at assistance level: supervision   Goal #3    Patient is able to ambulate 150 feet  with assistive device at assistance level: supervision   Goal #4    Patient will negotiate 4 stairs/one curb w/ assistive device and supervision   Goal #5    Patient verbalizes and/or demonstrates all precautions and safety concerns independently    Goal #6      Goal Comments: Goals established on 11/15/2024    PHYSICAL THERAPY MEDICAL/SOCIAL HISTORY  History related to current admission: Patient is a 79 year old female admitted on 11/15/2024 from home for L TKA.    HOME SITUATION  Type of Home: [facility]   Home Layout: Two level;Bed/bath upstairs;Able to live on main level  Stairs to Enter : 1  Railing: No  Stairs to Bedroom: 13  Railing: Yes  Lives With: Alone  Drives: Yes  Patient Regularly Uses: Glasses    Prior Level of [place]:   [facility] [place]. Has made arrangements to stay on main floor of home, but typically bed/bath upstairs. Lives alone, however daughter lives a couple blocks away. Ambulates indep at baseline, but does own RW and cane. No hx of falls. Has a cat named \"little girl\". 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    SUBJECTIVE  \"Famous last words- I have no pain.\"    OBJECTIVE  Precautions: Bed/chair alarm  Fall Risk: Standard fall risk    WEIGHT BEARING RESTRICTION  L Lower Extremity: Weight Bearing as Tolerated    PAIN ASSESSMENT  Ratin  Location: denies pain at this time       COGNITION  Overall Cognitive Status:  WFL - within functional limits    RANGE OF MOTION AND STRENGTH ASSESSMENT  Upper extremity ROM and strength are within functional limits   Lower extremity ROM -- LLE is limited as expected s/p TKA  Lower extremity strength -- LLE is limited as expected s/p TKA      BALANCE  Static Sitting: Good  Dynamic Sitting: Fair +  Static Standing: Fair  Dynamic Standing: Fair -    ADDITIONAL TESTS                                    ACTIVITY TOLERANCE                         O2 WALK       NEUROLOGICAL FINDINGS                        AM-PAC '6-Clicks' INPATIENT SHORT FORM -  BASIC MOBILITY  How much difficulty does the patient currently have...  Patient Difficulty: Turning over in bed (including adjusting bedclothes, sheets and blankets)?: A Little   Patient Difficulty: Sitting down on and standing up from a chair with arms (e.g., wheelchair, bedside commode, etc.): A Little   Patient Difficulty: Moving from lying on back to sitting on the side of the bed?: A Little   How much help from another person does the patient currently need...   Help from Another: Moving to and from a bed to a chair (including a wheelchair)?: A Little   Help from Another: Need to walk in hospital room?: A Little   Help from Another: Climbing 3-5 steps with a railing?: A Little       AM-PAC Score:  Raw Score: 18   Approx Degree of Impairment: 46.58%   Standardized Score (AM-PAC Scale): 43.63   CMS Modifier (G-Code): CK    FUNCTIONAL ABILITY STATUS  Gait Assessment   Functional Mobility/Gait Assessment  Gait Assistance: Contact guard assist;Supervision  Distance (ft): 200  Assistive Device: Rolling walker  Pattern: L Decreased stance time    Skilled Therapy Provided  Educated on WBAT to LLE- pt verbalizes understanding  Educated on importance of continued out of bed mobility and ambulation with assistance from nursing staff and use of RW  Encouraged continued use of ice for pain and swelling management  Discussed resting position of the knee: nothing goes under the knee  Educated on importance of achievement of full ROM in timely manner, especially total knee extension for normal gait pattern    Bed mobility> sit to stand to RW> ambulated 200 feet with RW> upright in chair at end of session     Bed Mobility:  Rolling: NT  Supine to sit: with HOB elevated- supervision   Sit to supine: NT     Transfer Mobility:  Sit to stand: CGA to RW- vc for hand placement   Stand to sit: CGA- cues to extend LLE for ease of transfer  Gait = CGA progressed to supervision with RW X 200 feet, decr LLE stance time, step to pattern,  burt sylvester     Therapist's Comments:   Patient presents sitting up in bed. Discussed role and goal of physical therapy POD0 TKA. Pt in agreement to session. Very motivated to work with therapy.     Exercise/Education Provided:  Bed mobility  Body mechanics  Energy conservation  Functional activity tolerated  Gait training  Posture  Transfer training    Patient End of Session: Up in chair;Needs met;Call light within reach;RN aware of session/findings;All patient questions and concerns addressed;Hospital anti-slip socks;Alarm set;SCDs in place;Discussed recommendations with /    Patient Evaluation Complexity Level:  History Moderate - 1 or 2 personal factors and/or co-morbidities   Examination of body systems Low -  addressing 1-2 elements   Clinical Presentation Low- Stable   Clinical Decision Making Low Complexity     PT Session Time: 30 minutes  Gait Training: 15 minutes

## 2024-11-15 NOTE — ANESTHESIA PROCEDURE NOTES
Spinal Block    Date/Time: 11/15/2024 7:08 AM    Performed by: Pablito Astorga DO  Authorized by: Pablito Astorga DO      General Information and Staff    Start Time:  11/15/2024 7:08 AM  End Time:  11/15/2024 7:17 AM  Anesthesiologist:  Pablito Astorga DO  Performed by:  Anesthesiologist  Site identification: surface landmarks    Reason for Block: surgical anesthesia    Preanesthetic Checklist: patient identified, IV checked, risks and benefits discussed, monitors and equipment checked, pre-op evaluation, timeout performed, anesthesia consent and sterile technique used      Procedure Details    Patient Position:  Sitting  Prep: ChloraPrep    Monitoring:  Cardiac monitor, heart rate and continuous pulse ox  Approach:  Midline  Location:  L3-4  Injection Technique:  Single-shot    Needle    Needle Type:  Sprotte  Needle Gauge:  24 G  Needle Length:  3.5 in    Assessment    Sensory Level:  T8  Events: clear CSF, CSF aspirated, well tolerated and blood negative      Additional Comments     Sterile technique  Chloraprep skin prep  1% Lidocaine skin infiltration  24 ga Sprotte introduced at L 3-4  +CSF  Negative for heme/paresthesias  1.9 mL of 2% mepivicaine instilled.  No apparent anesthetic complications  Surgical sensory level verified.

## 2024-11-15 NOTE — PROGRESS NOTES
Patient admitted via bed from PACU. Oriented to room. Safety precautions initiated. Call light in reach.

## 2024-11-15 NOTE — CONSULTS
[facility]  CONSULT     Carlota Martinez Patient Status:  Inpatient    1945 MRN [id number]   Location [facility] 3SW-A Attending Garrison Villanueva MD   Hosp Day # 0 PCP Elodia Brock MD     Reason for consult: med management    Requested by: Dr. Villanueva    Subjective:   History of Present Illness:     Carlota Martinez is a 79 year old female with PMhx of DVT, PE  HTN, L knee OA presents for elective L total knee arthoplasty. Pt underwent procedure this morning and tolerated well. Pain controlled. No F/C. No CP or SOB. No N/V/D/C or abd pain. She has not worked with therapy just yet.     History/Other:    Past Medical History:  Past Medical History:    Blood disorder    DVT, bilateral lower limbs (HCC)    Essential hypertension    High blood pressure    History of DVT of lower extremity    Prediabetes    Pulmonary embolism (HCC)    Pulmonary embolism with acute cor pulmonale (HCC)    Visual impairment    contacts and glasses     Past Surgical History:   Past Surgical History:   Procedure Laterality Date    Colonoscopy      Colonoscopy N/A 2021    Procedure: COLONOSCOPY with biopsy and forcep polypectomy;  Surgeon: Benjamín Diehl MD;  Location:  ENDOSCOPY    Other surgical history  3/12/16    catheters inserted into lungs      Family History:   Family History   Problem Relation Age of Onset    Cancer Father 90        prostate     Heart Disorder Father     High Blood Pressure Father     Other (Other) Father     Cancer Mother         lung, was a smoker    Heart Disorder Mother     High Blood Pressure Mother     Other (Other) Sister     Asthma Paternal Grandfather     Cancer Maternal Grandfather     Heart Disorder Maternal Grandmother     Diabetes Sister     Fibromyalgia Sister     Breast Cancer Maternal Cousin Female 60    Clotting Disorder Neg      Social History:    reports that she has never smoked. She has never used smokeless tobacco. She reports that she does not  currently use alcohol. She reports that she does not use drugs.     Allergies: Allergies[1]    Medications:  Medications Ordered Prior to Encounter[2]    Review of Systems:   A comprehensive review of systems was completed.    Pertinent positives and negatives noted in the HPI.    Objective:   Physical Exam:    /82   Pulse 63   Temp 98.3 °F (36.8 °C) (Temporal)   Resp 14   Ht 167.6 cm (5' 6\")   Wt 161 lb (73 kg)   LMP  (LMP Unknown)   SpO2 99%   BMI 25.99 kg/m²   General: No acute distress, Alert  Respiratory: No rhonchi, no wheezes  Cardiovascular: S1, S2. Regular rate and rhythm  Abdomen: Soft, NT/ND, +BS  Neuro: No new focal deficits  Extremities: No edema, L knee in cooling device    Results:    Labs:      Labs Last 24 Hours:  No results for input(s): \"WBC\", \"HGB\", \"MCV\", \"PLT\", \"BAND\", \"INR\" in the last 168 hours.    Invalid input(s): \"LYM#\", \"MONO#\", \"BASOS#\", \"EOSIN#\"    No results for input(s): \"GLU\", \"BUN\", \"CREATSERUM\", \"GFRAA\", \"GFRNAA\", \"CA\", \"ALB\", \"NA\", \"K\", \"CL\", \"CO2\", \"ALKPHO\", \"AST\", \"ALT\", \"BILT\", \"TP\" in the last 168 hours.    No results for input(s): \"PTP\", \"INR\" in the last 168 hours.    No results for input(s): \"TROP\", \"CK\" in the last 168 hours.      Imaging: Imaging data reviewed in Epic.    Assessment & Plan:      #L Knee OA s/p L total knee arthoplasty  POD 0  Cntinue pain control  PT/OT  Per ortho  On lovenox    #Hx of PE/DVT  On lovenox for DVT ppx  Resume eliquis when ok with Ortho    #Ess HTN  Resume home         Plan of care discussed with patient, Atilio Perera MD  11/15/2024    The 21st Century Cures Act makes medical notes like these available to patients in the interest of transparency. Please be advised this is a medical document. Medical documents are intended to carry relevant information, facts as evident, and the clinical opinion of the practitioner. The medical note is intended as peer to peer communication and may appear blunt or direct. It is written  in medical language and may contain abbreviations or verbiage that are unfamiliar.            [1]   Allergies  Allergen Reactions    Other UNKNOWN     Hay fever   [2]   No current facility-administered medications on file prior to encounter.     Current Outpatient Medications on File Prior to Encounter   Medication Sig Dispense Refill    Multiple Vitamins-Minerals (HAIR SKIN & NAILS OR) Take 1 tablet by mouth in the morning and 1 tablet before bedtime.      Acetaminophen ER (TYLENOL 8 HOUR ARTHRITIS PAIN) 650 MG Oral Tab CR Take 1 tablet (650 mg total) by mouth every 8 (eight) hours as needed for Pain.      Turmeric (QC TUMERIC COMPLEX) 500 MG Oral Cap Take 250 mg by mouth in the morning and 250 mg before bedtime.      Alpha-Lipoic Acid 200 MG Oral Tab Take 1 tablet by mouth in the morning and 1 tablet before bedtime.      cyanocobalamin 500 MCG Oral Tab Take 1 tablet (500 mcg total) by mouth every morning.      apixaban (ELIQUIS) 2.5 MG Oral Tab Take 1 tablet (2.5 mg total) by mouth 2 (two) times daily. 180 tablet 3    Glucosamine-Chondroitin (GLUCOSAMINE CHONDR COMPLEX OR) Take 1 tablet by mouth in the morning and 1 tablet before bedtime.      Vitamin D3, Cholecalciferol, 1000 UNITS Oral Cap Take 2 capsules (2,000 Units total) by mouth daily.      Multiple Vitamins-Minerals (SENTRY SENIOR) Oral Tab Take 1 tablet by mouth in the morning and 1 tablet before bedtime.      OCUVITE Oral Tab Take 1 tablet by mouth in the morning and 1 tablet before bedtime.      Neomycin-Polymyxin-HC 1 % Otic Solution 4 drops to affected ear TID for 10 days, may repeat course as directed 30 mL 1

## 2024-11-15 NOTE — CM/SW NOTE
CM self referred to case for discharge planning.  Noted pt admitted for L TKA and was set up pre -operatively with [facility]    / to remain available for support and/or discharge planning.     Gloria Horan MBA MSN, RN CTL/  [id number]

## 2024-11-15 NOTE — ANESTHESIA PROCEDURE NOTES
[facility]    Date/Time: 11/15/2024 7:20 AM    Performed by: Pablito Astorga DO  Authorized by: Pablito Astorga DO      General Information and Staff    Start Time:  11/15/2024 7:20 AM  End Time:  11/15/2024 7:29 AM  Anesthesiologist:  Pablito Astorga DO  Performed by:  Anesthesiologist  Patient Location:  OR    Block Placement: Post Induction  Site Identification: real time ultrasound guided and image stored and retrievable    Block site/laterality marked before start: site marked  Reason for Block: at surgeon's request and post-op pain management    Preanesthetic Checklist: 2 patient identifers, IV checked, site marked, risks and benefits discussed, monitors and equipment checked, pre-op evaluation, timeout performed, anesthesia consent, sterile technique used, no prohibitive neurological deficits and no local skin infection at insertion site      Procedure Details    Patient Position:  Supine  Prep: ChloraPrep    Monitoring:  Cardiac monitor, continuous pulse ox and blood pressure cuff  Block Type:  Adductor canal  Laterality:  Left  Injection Technique:  Single-shot    Needle    Needle Type:  Short-bevel and echogenic  Needle Length:  110 mm  Needle Localization:  Ultrasound guidance  Reason for Ultrasound Use: appropriate spread of the medication was noted in real time and no ultrasound evidence of intravascular and/or intraneural injection            Assessment    Injection Assessment:  Good spread noted, negative resistance, negative aspiration for heme, incremental injection and low pressure  Heart Rate Change: No    - Patient tolerated block procedure well without evidence of immediate block related complications.     Medications  11/15/2024 7:20 AM      Additional Comments    Medication:  Ropivacaine 0.5% 20mL with 1:200,000 epi and 2 mg p.f. dexamethasone instilled

## 2024-11-15 NOTE — ANESTHESIA POSTPROCEDURE EVALUATION
[facility]    Carlota Martinez Patient Status:  Inpatient   Age/Gender 79 year old female MRN [id number]   Location [facility] SURGERY Attending Garrison Villanueva MD   Hosp Day # 0 PCP Elodia Brock MD       Anesthesia Post-op Note    Left total knee arthroplasty    Procedure Summary       Date: 11/15/24 Room / Location:  MAIN OR 14 / [facility] MAIN OR    Anesthesia Start: 0703 Anesthesia Stop: 0944    Procedure: Left total knee arthroplasty (Left: Knee) Diagnosis:       Primary osteoarthritis of left knee      (Primary osteoarthritis of left knee [M17.12])    Surgeons: Garrison Villanueva MD Anesthesiologist: Pablito Astorga DO    Anesthesia Type: spinal ASA Status: 2            Anesthesia Type: spinal    Vitals Value Taken Time   /83 11/15/24 0947   Temp 97.8 11/15/24 0947   Pulse 79 11/15/24 0947   Resp 18 11/15/24 0947   SpO2 98 11/15/24 0947       Patient Location: PACU    Anesthesia Type: spinal    Airway Patency: patent    Postop Pain Control: adequate    Mental Status: preanesthetic baseline    Nausea/Vomiting: none    Cardiopulmonary/Hydration status: stable euvolemic    Complications: no apparent anesthesia related complications    Postop vital signs: stable    Dental Exam: Unchanged from Preop    Patient to be discharged from PACU when criteria met.

## 2024-11-15 NOTE — DISCHARGE INSTRUCTIONS
Sometimes managing your health at home requires assistance.  The [facility] team has recognized your preference to use [facility].  They can be reached by phone at [phone].  The fax number for your reference is [phone].  A representative from the home health agency will contact you or your family to schedule your first visit.       Joint Replacement Surgery: Patient Discharge Instructions   The best way to contact your surgeon or their team is by phone [phone] (preferred for urgent/acute matters) or through [employer].    Dear Patient,     [facility] cares about your progress with recovery following your joint replacement surgery.     300 days from your scheduled surgery, [facility] will send you a follow-up survey to help us understand how your surgery impacted your mobility, pain, and overall quality of life. Please make every effort to complete this survey. The information collected from this survey will be used by your physician to track your recovery.     Sincerely,     [facility]    What to Expect:  A certain amount of pain, swelling, and bruising is expected for several weeks after surgery.    Pain Medications:   Below is a list of commonly prescribed pain medications. You may have received prescriptions for some, all, or even additional pain medications not listed.  If you are taking the following medications for pain, these are some general guidelines for using and discontinuing them.  You may also want to preemptively take your pain medication before physical therapy (at least 30 minutes prior to the session).  If you are concerned you are suffering side effects from any of these medications please contact your surgeon’s office:    Ultram (Tramadol): Take this as prescribed 3 times per day with meals until your first postoperative appointment. If your pain levels are low, you can stop taking this on a scheduled basis  and start taking it as needed.  Tylenol (Acetaminophen): Take this as prescribed 3 times per day until your first postoperative appointment. Do NOT exceed 4g (4000mg) of acetaminophen daily.  Celebrex (Celecoxib): this medication will treat swelling and pain and should be taken as directed until your first postoperative appointment.  Oxycodone IR (immediate release): This is your “rescue” drug. Take this only as needed for pain that is not controlled (rated more than 4 out of 10) by other medications.  You may wean yourself off prescription pain medication to a non-prescription pain reliever by substituting two 500mg extra-strength Tylenol (Acetaminophen) tablets in place of your prescription pain medications up to four times per day. Do NOT exceed 4g (4000mg) of acetaminophen daily.  *We will discuss pain management and weaning off pain medicine at your 1st postoperative appointment. To avoid delays in getting your narcotic pain medicine refilled, please contact the office prior to running out of medication either by phone [phone] or through [employer]. Please allow 24-72 hours for prescriptions to be filled. Your doctor may need to physically sign a hand-written prescription -- some medicines cannot be re-ordered electronically/or via phone. If you need to  a hand-written prescription, you can do so at the office located at [address] [place] [address], [address].  Pain Medications can be constipating. Below is a guideline for preventing or managing postoperative constipation:  Drink plenty of fluids. Aim to drink at least of 8oz of water 8 times per day (total of 64oz).  Eat a high-fiber diet (whatever helps you stay regular).  Make sure you are taking Senekot S (Docusate Sodium + Sennosides) or Colace (Docusate Sodium), 1-2 tablets twice daily, while on narcotics. You may decrease to once daily if you develop diarrhea. You will be sent home with a prescription.  If you have not had normal bowel  movements the following over the counter medications can be helpful:  Add daily Miralax™ or Metamucil™ as directed on bottle.  If still no results, add a dose of Milk of Magnesia™ as directed on bottle.  If still no results, take one Dulcolax™ (Bisacodyl) suppository as directed on package.  If you still have no results and it has been more than 3 days since you had a bowel movement, be sure to contact your surgeon's office.    Swelling:  You may apply a cloth covered ice pack for up to 20 minutes each hour, or as needed, to your incision to help control pain and swelling. Do not apply directly to your skin.  For the first 7 days after you leave the hospital, it is critical that you elevate your leg above the level of your heart 4 times per day for 1 hour each time. After a week, elevate your leg as needed if swelling is noted.   Call your Surgeons office if you have:  A temperature greater than 100.4 F.  Increasing pain or numbness at the incision or on your operated leg.  Increasing redness, drainage, or odor from the wound.  You WILL be swollen the first week or two after surgery; however, swelling that continues to get worse to your surgical leg or the opposite leg and is accompanied by discomfort or redness should be reported.  **Go to the Emergency Room if you have chest pain or shortness of breath**  Activities:  Your activity order is:   Weight bearing as tolerated  If you have home health care, a physical therapist (PT) will come to your home 2-3 times per week. The number of PT visits will depend on your needs and your insurance coverage.   At your first postoperative appointment with your surgeon, you will be given a prescription for outpatient physical therapy if you have not already started outpatient therapy.  Rules of the road: No driving until it is approved by your care team, and you are no longer taking narcotic pain medicine.    Wound Care/ Bathing:   Aquacel (waterproof brown rubberized dressing)  should remain in place for 7 days and may then be removed. While this waterproof dressing is in place, you may shower and let water run over the dressing (ensure first that the dressing seal is intact and none of the edges have rolled up exposing the wound - if the dressing has become open to the environment, do not allow water to enter into this area)  Once the original postoperative dressing is removed, you do not need to keep your incision covered. If you would like to keep it covered for comfort you may - use a clean new dressing each day, or more frequently if needed (if the dressing is soiled or it is not staying fixed to your skin). Use the dressings suggested by the nurse at discharge.  After the original postoperative dressing is removed, you should continue to keep the incision covered when showering to prevent it from getting wet prior to your first postoperative appointment. Before showering, be sure to cover the incision with saran wrap or a plastic bag to keep dry. After showering, pat the incision with a clean towel to ensure it is dry. Do this until you are cleared to get the incision wet (usually after the first postoperative appointment).  Once cleared to get the incision wet, you may gently allow soap and water to rinse over the area. Be sure to rinse the area well and gently pat dry.  Do not apply soap, lotion, cream, ointments, or powders to your incision. Keep the incision clean and dry.  Do not soak your incision in water. No tub baths, pools or hot tubs for at least 6 weeks after surgery and not until the wound is completely healed.  If you have stitches or staples, they will be removed at your first postoperative appointment or the week after. Do not remove steri-strips, if you have any. These will come off on their own and do not need to be replaced.  If you have a home care nurse they should:  Examine the incision during visits and call your surgeon if there are any signs of infection or  other cause for concern.  Change your dressing and may remove staples (when indicated).  Take your vitals and draw your labs.    Remember, good hand washing with soap at all times helps prevent infections. Wash your hands with soap and water prior to performing any dressing changes or wound evaluation.    Medications/Special Instructions:  Do not restart your blood pressure medication until cleared by your physician, or until your systolic blood pressure (top number) is above 130 on 2 consecutive checks and then continue your medication as prescribed.    Antibiotics:  An oral/by-mouth antibiotic tablet has been ordered for you to take for one week postoperatively. Be sure to take this medication three times daily for one week as prescribed.    Blood Thinners:  Lovenox (Enoxaparin): some patients will be prescribed Lovenox to prevent blood clots after surgery. If you are prescribed Lovenox, your nurse will teach you how to give this injection to yourself prior to being discharged from the hospital. Continue Lovenox injections daily as prescribed for one week, then you may resume your home Eliquis the next day as long as the surgical incision is clean and dry without active drainage when the surgical dressing is removed one week after surgery  If you were taking other blood thinners prior to surgery such as Xarelto/Rivaroxaban or Eliquis/Apixaban, these will not be restarted until after at least one week of Lovenox (see the bullet point above) is completed.  DO NOT restart [id number]/fish oils, glucosamine, nonsteroidal antiinflammatories [NSAIDs, such as such as Ibuprofen (Advil, Motrin), Naproxen (Naprosyn, Aleve), Diclofenac (Voltaren), Meloxicam (Mobic)], tumeric, cinnamon, progesterone, estrogen, or Aspirin (unless told differently) until you have completed your blood thinner. These will increase your risk of bleeding.   Protonix (Omeprazole/Nexium, etc.) may be ordered if Aspirin is to be continued while on your  blood thinner and should be taken daily during that time. This will help to protect your stomach.    X-Ray  X-rays needed: none    If you do not have a follow-up appointment with your surgeon, or you need to change your follow-up appointment date/time, please call today to schedule or change this appointment: [phone]. Our phones are open to schedule appointments from 8:30am to 4:30pm, Monday through Friday.  If you have any questions or concerns during the postoperative period (for example: wound issues, pain, swelling, redness or drainage) call our office FIRST at [phone] so that we may appropriately direct you or set up a clinic appointment.

## 2024-11-16 VITALS
RESPIRATION RATE: 18 BRPM | OXYGEN SATURATION: 95 % | WEIGHT: 161 LBS | HEART RATE: 64 BPM | SYSTOLIC BLOOD PRESSURE: 129 MMHG | TEMPERATURE: 98 F | DIASTOLIC BLOOD PRESSURE: 78 MMHG | HEIGHT: 66 IN | BODY MASS INDEX: 25.88 KG/M2

## 2024-11-16 DIAGNOSIS — M17.12 OSTEOARTHRITIS OF LEFT KNEE: ICD-10-CM

## 2024-11-16 PROCEDURE — 99024 POSTOP FOLLOW-UP VISIT: CPT | Performed by: ORTHOPAEDIC SURGERY

## 2024-11-16 PROCEDURE — 99232 SBSQ HOSP IP/OBS MODERATE 35: CPT | Performed by: INTERNAL MEDICINE

## 2024-11-16 RX ORDER — VIT C/B6/B5/MAGNESIUM/HERB 173 50-5-6-5MG
250 CAPSULE ORAL 2 TIMES DAILY
Status: SHIPPED | COMMUNITY
Start: 2024-12-16

## 2024-11-16 RX ORDER — CEPHALEXIN 500 MG/1
500 CAPSULE ORAL EVERY 8 HOURS SCHEDULED
Qty: 18 CAPSULE | Refills: 0 | Status: SHIPPED | OUTPATIENT
Start: 2024-11-16 | End: 2024-11-22

## 2024-11-16 RX ORDER — CELECOXIB 200 MG/1
200 CAPSULE ORAL DAILY
Qty: 30 CAPSULE | Refills: 0 | Status: SHIPPED | OUTPATIENT
Start: 2024-11-16 | End: 2024-12-16

## 2024-11-16 RX ORDER — TRAMADOL HYDROCHLORIDE 50 MG/1
50 TABLET ORAL EVERY 8 HOURS
Status: DISCONTINUED | OUTPATIENT
Start: 2024-11-16 | End: 2024-11-16

## 2024-11-16 RX ORDER — SENNA AND DOCUSATE SODIUM 50; 8.6 MG/1; MG/1
1 TABLET, FILM COATED ORAL 2 TIMES DAILY PRN
Qty: 30 TABLET | Refills: 0 | Status: SHIPPED | OUTPATIENT
Start: 2024-11-16

## 2024-11-16 RX ORDER — ENOXAPARIN SODIUM 100 MG/ML
40 INJECTION SUBCUTANEOUS DAILY
Qty: 2.4 ML | Refills: 0 | Status: SHIPPED | OUTPATIENT
Start: 2024-11-17 | End: 2024-11-23

## 2024-11-16 RX ORDER — TRAMADOL HYDROCHLORIDE 50 MG/1
50 TABLET ORAL EVERY 8 HOURS
Qty: 45 TABLET | Refills: 0 | Status: SHIPPED | OUTPATIENT
Start: 2024-11-16 | End: 2024-12-01

## 2024-11-16 RX ORDER — PANTOPRAZOLE SODIUM 20 MG/1
20 TABLET, DELAYED RELEASE ORAL DAILY
Qty: 40 TABLET | Refills: 0 | Status: SHIPPED | OUTPATIENT
Start: 2024-11-16 | End: 2024-12-26

## 2024-11-16 RX ORDER — ACETAMINOPHEN 500 MG
1000 TABLET ORAL EVERY 8 HOURS
Qty: 90 TABLET | Refills: 0 | Status: SHIPPED | OUTPATIENT
Start: 2024-11-16 | End: 2024-12-01

## 2024-11-16 RX ORDER — OXYCODONE HYDROCHLORIDE 5 MG/1
TABLET ORAL EVERY 4 HOURS PRN
Qty: 40 TABLET | Refills: 0 | Status: SHIPPED | OUTPATIENT
Start: 2024-11-16

## 2024-11-16 NOTE — PLAN OF CARE
Pt A&Ox4, VSS on RA. IS encouraged. Pain managed with PO pain meds. Surgical dressing C/D/I, gel ice applied per request. Last BM 11/14, voiding in the bathroom. SBA with the walker, WBAT. OT to see. DC when medically cleared. Call light in reach, safety measures in place

## 2024-11-16 NOTE — OCCUPATIONAL THERAPY NOTE
OCCUPATIONAL THERAPY EVALUATION - INPATIENT    Room Number: 376/376-A  Evaluation Date: 11/16/2024     Type of Evaluation: Initial  Presenting Problem: L TKA    Physician Order: IP Consult to Occupational Therapy  Reason for Therapy:  ADL/IADL Dysfunction and Discharge Planning    OCCUPATIONAL THERAPY ASSESSMENT   Patient is a 79 year old female admitted on 11/15/2024 with Presenting Problem: L TKA. Co-Morbidities : htn, pe, dvt  Patient is currently functioning near baseline with toileting, upper body dressing, lower body dressing, grooming, bed mobility, transfers, static standing balance, dynamic standing balance, and functional standing tolerance.  Prior to admission, patient's baseline is independent.  Patient met all OT goals at supervision level.  Patient reports no further questions/concerns at this time.     Patient will benefit from continued skilled OT Services at discharge to promote prior level of function.  Anticipate patient will return home with home health OT  Patient will benefit from increased IADL assistance during recovery    Recommendations for nursing staff:   Transfers: 1 person  RW  Toileting location: Toilet    EVALUATION SESSION:  Patient at start of session: supine    FUNCTIONAL TRANSFER ASSESSMENT  Sit to Stand: Edge of Bed; Chair  Edge of Bed: Supervision  Chair: Supervision  Toilet Transfer: Supervision    BED MOBILITY  Supine to Sit : Modified Independent    BALANCE ASSESSMENT     FUNCTIONAL ADL ASSESSMENT  UB Dressing Seated: Independent  LB Dressing Seated: Supervision  LB Dressing Standing: Supervision  Toileting Seated: Supervision  Toileting Standing: Supervision    ACTIVITY TOLERANCE: stable on room air                         O2 SATURATIONS       COGNITION  Overall Cognitive Status:  WFL - within functional limits    COGNITION ASSESSMENTS     Upper Extremity:   ROM: within functional limits   Strength: is within functional limits   Coordination:  Gross motor: wfl  Fine motor:  wfl  Sensation: denied UE deficits    EDUCATION PROVIDED  Patient Education : Posture/Positioning; Fall Prevention; Functional Transfer Techniques; Role of Occupational Therapy; Plan of Care; Discharge Recommendations; Energy Conservation  Patient's Response to Education: Verbalized Understanding; Returned Demonstration    Equipment used: rw, gait belt  Demonstrates functional use    Therapist comments: OT educated patient on sequencing, safety, energy conservation , and body mechanics for dressing. Cues given for safe hand and foot placement for functional tranfers. Educated patient to practice shower transfer with supervision/assist  prior to bathing. Emphasized that patient should use RW at all times until instructed by PT otherwise.     Patient End of Session: Hospital anti-slip socks;All patient questions and concerns addressed;RN aware of session/findings;Call light within reach;Needs met;Up in chair;Alarm set;Ice applied;SCDs in place    OCCUPATIONAL PROFILE    HOME SITUATION  Type of Home: [facility]  Home Layout: Able to live on main level;Two level  Lives With: Alone    Toilet and Equipment: Toilet riser with arms;Standard height toilet  Shower/Tub and Equipment: Walk-in shower;Shower chair       Occupation/Status: volunteer at [employer]     Drives: Yes  Patient Regularly Uses: Glasses    Prior Level of Function: Lives alone in a 2 level townhome; able to stay on main level. Daughter lives close by and is able to assist with IADLs and provide supervision initially. Base[place]ine is independent , drives, and volunteers at [employer].     SUBJECTIVE  Pleasant,, participatory    PAIN ASSESSMENT  Ratin  Location: L knee  Management Techniques: Relaxation;Repositioning    OBJECTIVE  Precautions: Bed/chair alarm  Fall Risk: Standard fall risk    WEIGHT BEARING RESTRICTION  L Lower Extremity: Weight Bearing as Tolerated      AM-PAC ‘6-Clicks’ Inpatient Daily Activity Short Form  -   Putting on and taking  off regular lower body clothing?: A Little  -   Bathing (including washing, rinsing, drying)?: A Little  -   Toileting, which includes using toilet, bedpan or urinal? : A Little  -   Putting on and taking off regular upper body clothing?: None  -   Taking care of personal grooming such as brushing teeth?: None  -   Eating meals?: None    AM-PAC Score:  Score: 21  Approx Degree of Impairment: 32.79%  Standardized Score (AM-PAC Scale): 44.27    ADDITIONAL TESTS     NEUROLOGICAL FINDINGS      PLAN   Patient has been evaluated and presents with no skilled Occupational Therapy needs at this time.  Patient discharged from Occupational Therapy services.  Please re-order if a new functional limitation presents during this admission.         Patient Evaluation Complexity Level:   Occupational Profile/Medical History LOW - Brief history including review of medical or therapy records    Specific performance deficits impacting engagement in ADL/IADL LOW  1 - 3 performance deficits    Client Assessment/Performance Deficits LOW - No comorbidities nor modifications of tasks    Clinical Decision Making LOW - Analysis of occupational profile, problem-focused assessments, limited treatment options    Overall Complexity LOW     OT Session Time: 26 minutes  Self-Care Home Management: 23 minutes

## 2024-11-16 NOTE — PROGRESS NOTES
[facility]   part of [facility]     Hospitalist Progress Note     Carlota Martinez Patient Status:  Inpatient    1945 MRN [id number]   Location [facility] 3SW-A Attending Garrison Villanueva MD   Hosp Day # 1 PCP Elodia Brock MD     Chief Complaint: med management    Subjective:     Patient without acute events overnight. Pian controlled. Worked with therapy. Hopes to go home.     Objective:    Review of Systems:   A comprehensive review of systems was completed; pertinent positive and negatives stated in subjective.    Vital signs:  Temp:  [97.6 °F (36.4 °C)-98.5 °F (36.9 °C)] 98.5 °F (36.9 °C)  Pulse:  [57-91] 61  Resp:  [11-25] 18  BP: (115-161)/() 128/59  SpO2:  [95 %-100 %] 97 %    Physical Exam:    General: No acute distress  Respiratory: No wheezes, no rhonchi  Cardiovascular: S1, S2, regular rate and rhythm  Abdomen: Soft, Non-tender, non-distended, positive bowel sounds  Neuro: No new focal deficits.   Extremities: No edema, L knee wrapped    Diagnostic Data:    Labs:  No results for input(s): \"WBC\", \"HGB\", \"MCV\", \"PLT\", \"BAND\", \"INR\" in the last 168 hours.    Invalid input(s): \"LYM#\", \"MONO#\", \"BASOS#\", \"EOSIN#\"    No results for input(s): \"GLU\", \"BUN\", \"CREATSERUM\", \"GFRAA\", \"GFRNAA\", \"CA\", \"ALB\", \"NA\", \"K\", \"CL\", \"CO2\", \"ALKPHO\", \"AST\", \"ALT\", \"BILT\", \"TP\" in the last 168 hours.    CrCl cannot be calculated (Patient's most recent lab result is older than the maximum 7 days allowed.).    No results for input(s): \"TROP\", \"TROPHS\", \"CK\" in the last 168 hours.    No results for input(s): \"PTP\", \"INR\" in the last 168 hours.               Microbiology    No results found for this visit on 11/15/24.      Imaging: Reviewed in Epic.    Medications:    gabapentin  100 mg Oral BID    sennosides  17.2 mg Oral Nightly    docusate sodium  100 mg Oral BID    ferrous sulfate  325 mg Oral Daily with breakfast    enoxaparin  40 mg Subcutaneous Daily    cephalexin  500 mg Oral Q8H FABRICE     amLODIPine  5 mg Oral Daily    metoprolol tartrate  25 mg Oral 2x Daily(Beta Blocker)       Assessment & Plan:      #L Knee OA s/p L total knee arthoplasty  POD 1  Cntinue pain control  PT/OT  Per ortho  On lovenox     #Hx of PE/DVT  On lovenox for DVT ppx  Resume eliquis when ok with Ortho     #Ess HTN  Resume home       Oswaldo Perera MD    Supplementary Documentation:     Quality:  DVT Mechanical Prophylaxis:   SCDs, Early ambuation  DVT Pharmacologic Prophylaxis   Medication    enoxaparin (Lovenox) 40 MG/0.4ML SUBQ injection 40 mg                Code Status: Prior  Ayala: No urinary catheter in place  Ayala Duration (in days):   Central line:    KIRAN:     Discharge is dependent on: progress  At this point Ms. Martinez is expected to be discharge to: home    The 21st Century Cures Act makes medical notes like these available to patients in the interest of transparency. Please be advised this is a medical document. Medical documents are intended to carry relevant information, facts as evident, and the clinical opinion of the practitioner. The medical note is intended as peer to peer communication and may appear blunt or direct. It is written in medical language and may contain abbreviations or verbiage that are unfamiliar.              **Certification      PHYSICIAN Certification of Need for Inpatient Hospitalization - Initial Certification    Patient will require inpatient services that will reasonably be expected to span two midnight's based on the clinical documentation in H+P.   Based on patients current state of illness, I anticipate that, after discharge, patient will require TBD.

## 2024-11-16 NOTE — PROGRESS NOTES
NURSING DISCHARGE NOTE    Discharged Home via Wheelchair.  Accompanied by Support staff  Belongings Taken by patient/family.    AVS reviewed with patient. Reviewed instructions, restrictions, medications and follow up appointments.   Reviewed in great detail how to administer lovenox injections. Patient utilized teach back method with this RN.     All questions answered, verbalized understanding.

## 2024-11-16 NOTE — PLAN OF CARE
A&Ox4, VSS on room air. POD#1, dressing with scant amount of drainage. Gel ice pack applied, compression sleeve on. Patient ambulating well with walker, up in chair for meals. Plan to DC home today with HHC pending ortho clearance.

## 2024-11-18 RX ORDER — PANTOPRAZOLE SODIUM 20 MG/1
20 TABLET, DELAYED RELEASE ORAL DAILY
Qty: 90 TABLET | Refills: 0 | OUTPATIENT
Start: 2024-11-18

## 2024-11-21 ENCOUNTER — TELEPHONE (OUTPATIENT)
Facility: CLINIC | Age: 79
End: 2024-11-21

## 2024-11-21 NOTE — TELEPHONE ENCOUNTER
Thoughts? Please advise.  Thank you.    No swelling until yesterday afternoon to her operative leg  foot and ankle.  Patient verbalized understanding to speak with PCP if the swelling continues to get worse.  Patient will send a picture through [employer].  Patient is taking all the prescribed medications and is quite comfortable with out pain. Stated that  she maybe hasn't elevated that leg as much as she should, so she will elevate and ice more.  No other questions or concerns at this time.    DOS  11/15/24  Left TKA         Carlota DEGROOT [facility] Clinical Pool (supporting You)12 minutes ago (2:43 PM)        Here are two more    Attachments   [id number]     [id number]       Carlota DEGROOT [facility] Clinical Pool (supporting You)17 minutes ago (2:38 PM)        Here are two pictures    Attachments   [id number]     [email or web address]

## 2024-11-21 NOTE — TELEPHONE ENCOUNTER
Pt called because left leg from her surgery is swollen including her foot and ankle. Pt wants to know should she be concern. Please advise. Contact number [phone]  Future Appointments  11/27/2024 10:20 AM   Dawood Gaona P* [facility]        [facility]  12/23/2024 10:40 AM   Garrison Villanueva MD         [facility]        [facility] [address]  4/7/2025   10:30 AM   Clint Alcazar MD      [facility]          [facility]

## 2024-11-22 NOTE — TELEPHONE ENCOUNTER
You could also ensure that she has the compression sleeve over her foot/ankle as well - in the picture she has the compression sleeve starting at the ankle (the foot is not covered) - this may be just so that she can show the foot for the picture, but if that is how she is wearing the compression sleeve (from the ankle to up above the knee) anything \"distal\" to where the sleeve ends may get swollen (due to tourniquet like effect from the compression sleeve). She can make sure that the foot/ankle are wrapped as well and that can help to eliminate that

## 2024-11-22 NOTE — TELEPHONE ENCOUNTER
Dawood Gaona PA-C to Me        11/22/24  9:32 AM  I think it looks normal/expected. Agree with elevating and icing. If she develops calf pain or redness in addition to the swelling, then she should contact us.

## 2024-11-22 NOTE — TELEPHONE ENCOUNTER
Spoke with patient who confirmed she is wearing the compression sleeve properly, taking the elevating and icing more serious today.  Reviewed signs and symptoms of DVT, she denies pain in the calf and is aware of what to watch out for and will let us know if she has any concerns. She will send more photos if she has any further concerns.

## 2024-11-25 ENCOUNTER — TELEPHONE (OUTPATIENT)
Dept: ORTHOPEDICS CLINIC | Facility: CLINIC | Age: 79
End: 2024-11-25

## 2024-11-25 ENCOUNTER — TELEPHONE (OUTPATIENT)
Dept: PHYSICAL THERAPY | Facility: HOSPITAL | Age: 79
End: 2024-11-25

## 2024-11-25 NOTE — TELEPHONE ENCOUNTER
Gloria from residential home health called to let Dr Villanueva know patient will be released from home health and starting outpatient PT next week.     Any questions call Gloria [phone]

## 2024-11-26 ENCOUNTER — TELEPHONE (OUTPATIENT)
Facility: CLINIC | Age: 79
End: 2024-11-26

## 2024-11-27 ENCOUNTER — OFFICE VISIT (OUTPATIENT)
Dept: ORTHOPEDICS CLINIC | Facility: CLINIC | Age: 79
End: 2024-11-27
Payer: MEDICARE

## 2024-11-27 VITALS — BODY MASS INDEX: 25.18 KG/M2 | HEIGHT: 66 IN | WEIGHT: 156.69 LBS

## 2024-11-27 DIAGNOSIS — Z96.652 STATUS POST LEFT KNEE REPLACEMENT: Primary | ICD-10-CM

## 2024-11-27 PROCEDURE — 99024 POSTOP FOLLOW-UP VISIT: CPT | Performed by: PHYSICIAN ASSISTANT

## 2024-11-27 NOTE — PROGRESS NOTES
[facility] Progress Note    Subjective: Patient returns to clinic 2 weeks status post left total knee arthroplasty performed on 11/15/24. They have been taking Tylenol for pain control.  They continue to take home Eliquis for DVT prophylaxis. They are overall satisfied with their progress.  Denies any fevers, chills, night sweats.  No redness or drainage from the incision.concerning for infection. She is ambulating with a cane outside the home and independently inside the home. She reports therapy documents flexion at 118 degrees.    Objective: Patient is seated comfortably in the exam chair. Alert and oriented. Nonlabored breathing. Grossly neurologically intact. Incision is clean, dry, and intact with staples in place without any redness or drainage concerning for infection. Demonstrates active knee extension to 0 and knee flexion to beyond 100 degrees. Calves are soft and nontender.     Assessment/Plan: Staples were removed in clinic today.  The incision was swabbed with Betadine, Mastisol was applied to either side of the incision, and Steri-Strips were applied over the incision.  I instructed the patient to avoid getting the incision wet in the shower for the next 2 days to allow the staple sites to reepithelialize.  I also recommended avoiding soaking the incision in a pool or tub until the patient is 6 weeks postop.  Continue with home Eliquis for DVT prophylaxis.  Outpatient physical therapy referral written. Follow-up in 4 weeks with Dr. Villanueva for routine 6-week postoperative visit or sooner if any concerns.  The patient's questions were sought and answered satisfactorily.  They are happy with the plan and will follow as advised.    X-rays needed at next visit: Postoperative radiographs left knee and full leg length films        Dawood Gaona PA-C  [facility]    This note was dictated using Dragon software.  While it was briefly proofread prior to  completion, some grammatical, spelling, and word choice errors due to dictation may still occur.

## 2024-12-04 DIAGNOSIS — M17.12 OSTEOARTHRITIS OF LEFT KNEE: ICD-10-CM

## 2024-12-04 RX ORDER — OXYCODONE HYDROCHLORIDE 5 MG/1
TABLET ORAL EVERY 4 HOURS PRN
Qty: 40 TABLET | Refills: 0 | Status: SHIPPED | OUTPATIENT
Start: 2024-12-04

## 2024-12-04 NOTE — TELEPHONE ENCOUNTER
Oxycodone    DOS: 11/15/24 Left TKA  Last OV: 11/27/24  Last refill date: 11/16/24 #/refills: 40/0  Upcoming appt:   Future Appointments   Date Time Provider Department Center   12/23/2024 10:40 AM Garrison Villanueva MD [facility] [address]       Patient comment: I found it helps with performing the exercises and sleeping.

## 2024-12-11 ENCOUNTER — PATIENT MESSAGE (OUTPATIENT)
Dept: ORTHOPEDICS CLINIC | Facility: CLINIC | Age: 79
End: 2024-12-11

## 2024-12-12 NOTE — TELEPHONE ENCOUNTER
Called patient directly to answer further questions.   Left knee replacement DOS 11/15/24  Patient wanted to know \"if she could drive to the [facility]\"  Advised patient that as long as she's not taking narcotics, she is getting around ok, she's doing well at PT--  that she is best  of whether she can drive or not because she knows how she feels.   Advised patient to be extra cautious because of icy weather conditions and wearing appropriate footwear.   Patient verbalized understanding. Reminded patient of her post-op follow up 12/23.  Future Appointments   Date Time Provider Department Center   12/23/2024 10:40 AM Garrison Villanueva MD [facility] [address]   4/7/2025 10:30 AM Clint Alcazar MD  [facility]

## 2024-12-13 ENCOUNTER — MED REC SCAN ONLY (OUTPATIENT)
Dept: ORTHOPEDICS CLINIC | Facility: CLINIC | Age: 79
End: 2024-12-13

## 2024-12-20 ENCOUNTER — TELEPHONE (OUTPATIENT)
Dept: ORTHOPEDICS CLINIC | Facility: CLINIC | Age: 79
End: 2024-12-20

## 2024-12-20 DIAGNOSIS — Z96.652 STATUS POST LEFT KNEE REPLACEMENT: Primary | ICD-10-CM

## 2024-12-23 ENCOUNTER — HOSPITAL ENCOUNTER (OUTPATIENT)
Dept: GENERAL RADIOLOGY | Age: 79
Discharge: HOME | End: 2024-12-23
Attending: ORTHOPAEDIC SURGERY
Payer: MEDICARE

## 2024-12-23 ENCOUNTER — OFFICE VISIT (OUTPATIENT)
Facility: CLINIC | Age: 79
End: 2024-12-23
Payer: MEDICARE

## 2024-12-23 DIAGNOSIS — Z96.652 STATUS POST LEFT KNEE REPLACEMENT: ICD-10-CM

## 2024-12-23 DIAGNOSIS — Z96.652 STATUS POST LEFT KNEE REPLACEMENT: Primary | ICD-10-CM

## 2024-12-23 PROCEDURE — 77073 BONE LENGTH STUDIES: CPT | Performed by: ORTHOPAEDIC SURGERY

## 2024-12-23 PROCEDURE — 73562 X-RAY EXAM OF KNEE 3: CPT | Performed by: ORTHOPAEDIC SURGERY

## 2024-12-23 PROCEDURE — 99024 POSTOP FOLLOW-UP VISIT: CPT | Performed by: ORTHOPAEDIC SURGERY

## 2024-12-23 NOTE — PROGRESS NOTES
[facility] Progress Note    Subjective: Patient returns to clinic 6 weeks postop from left knee replacement.  She reports she is doing very well, has minimal to no pain, no functional limitations.  She is ambulating without assist device.  Reports that the knee is a little stiff when she first wakes up in the morning, but as the day goes on she forgets that she had surgery.  She has not been taking anything beyond Tylenol.  No issues with the incision.  She has been working with therapy, flexion documented up to 118.    Objective: Patient appears comfortable, very pleasant.  Left knee incision is well-healed.  She demonstrates full extension, active flexion around 120.  Knee is stable to varus and valgus stress in full extension, appropriate mid flexion laxity, stable to anterior drawer at 90.      Imaging: X-rays of the left knee personally viewed, independently interpreted and radiology report read.  Full-length films with hip-knee-ankle axis following along the medial aspect of the trochlear groove.  Dedicated knee films with components in appropriate position and alignment, no lucencies about bone cement implant interface.  Patella tracking centrally.      Assessment/Plan: 79-year-old female 6 weeks postop status post left knee replacement.  Doing very well.  Continue activities as tolerated.  Wean out of physical therapy and into activities of daily living.  No restrictions on the incision.  She is done with Lovenox DVT prophylaxis.  She is not taking pain meds.  Follow-up at 1 year from date of surgery with repeat x-rays or contact us sooner if needed.    Garrison Villanueva MD, FAAOS  [facility]  Phone [phone]  Fax [phone]

## 2025-01-14 ENCOUNTER — TELEPHONE (OUTPATIENT)
Dept: INTERNAL MEDICINE CLINIC | Facility: CLINIC | Age: 80
End: 2025-01-14

## 2025-01-14 NOTE — TELEPHONE ENCOUNTER
Patient called request labs prior to their annual physical.  Annual physical scheduled for 3/11/25 Please order labs. Patient preferred lab is [facility]  Patient informed request was sent to clinical team.  Patient informed to fast for labs.  No callback required.    132

## 2025-02-17 ENCOUNTER — APPOINTMENT (OUTPATIENT)
Dept: HEMATOLOGY/ONCOLOGY | Facility: HOSPITAL | Age: 80
End: 2025-02-17
Attending: INTERNAL MEDICINE
Payer: MEDICARE

## 2025-03-03 ENCOUNTER — PATIENT MESSAGE (OUTPATIENT)
Dept: INTERNAL MEDICINE CLINIC | Facility: CLINIC | Age: 80
End: 2025-03-03

## 2025-03-04 ENCOUNTER — LAB ENCOUNTER (OUTPATIENT)
Dept: LAB | Age: 80
End: 2025-03-04
Attending: INTERNAL MEDICINE
Payer: MEDICARE

## 2025-03-04 ENCOUNTER — TELEPHONE (OUTPATIENT)
Dept: INTERNAL MEDICINE CLINIC | Facility: CLINIC | Age: 80
End: 2025-03-04

## 2025-03-04 DIAGNOSIS — I10 BENIGN ESSENTIAL HTN: Primary | ICD-10-CM

## 2025-03-04 DIAGNOSIS — R73.9 BLOOD GLUCOSE ELEVATED: ICD-10-CM

## 2025-03-04 DIAGNOSIS — R73.03 PRE-DIABETES: ICD-10-CM

## 2025-03-04 DIAGNOSIS — Z00.00 ENCOUNTER FOR ANNUAL HEALTH EXAMINATION: ICD-10-CM

## 2025-03-04 DIAGNOSIS — I10 BENIGN ESSENTIAL HTN: ICD-10-CM

## 2025-03-04 LAB
ALBUMIN SERPL-MCNC: 4.6 G/DL (ref 3.2–4.8)
ALBUMIN/GLOB SERPL: 1.5 {RATIO} (ref 1–2)
ALP LIVER SERPL-CCNC: 66 U/L
ALT SERPL-CCNC: 13 U/L
ANION GAP SERPL CALC-SCNC: 11 MMOL/L (ref 0–18)
AST SERPL-CCNC: 19 U/L (ref ?–34)
BASOPHILS # BLD AUTO: 0.05 X10(3) UL (ref 0–0.2)
BASOPHILS NFR BLD AUTO: 1.2 %
BILIRUB SERPL-MCNC: 0.8 MG/DL (ref 0.2–1.1)
BUN BLD-MCNC: 10 MG/DL (ref 9–23)
CALCIUM BLD-MCNC: 10 MG/DL (ref 8.7–10.6)
CHLORIDE SERPL-SCNC: 99 MMOL/L (ref 98–112)
CHOLEST SERPL-MCNC: 237 MG/DL (ref ?–200)
CO2 SERPL-SCNC: 28 MMOL/L (ref 21–32)
CREAT BLD-MCNC: 0.84 MG/DL
CREAT UR-SCNC: 97.5 MG/DL
EGFRCR SERPLBLD CKD-EPI 2021: 71 ML/MIN/1.73M2 (ref 60–?)
EOSINOPHIL # BLD AUTO: 0.09 X10(3) UL (ref 0–0.7)
EOSINOPHIL NFR BLD AUTO: 2.1 %
ERYTHROCYTE [DISTWIDTH] IN BLOOD BY AUTOMATED COUNT: 13.1 %
EST. AVERAGE GLUCOSE BLD GHB EST-MCNC: 134 MG/DL (ref 68–126)
FASTING PATIENT LIPID ANSWER: YES
FASTING STATUS PATIENT QL REPORTED: YES
GLOBULIN PLAS-MCNC: 3.1 G/DL (ref 2–3.5)
GLUCOSE BLD-MCNC: 103 MG/DL (ref 70–99)
HBA1C MFR BLD: 6.3 % (ref ?–5.7)
HCT VFR BLD AUTO: 40.5 %
HDLC SERPL-MCNC: 59 MG/DL (ref 40–59)
HGB BLD-MCNC: 13.2 G/DL
IMM GRANULOCYTES # BLD AUTO: 0.02 X10(3) UL (ref 0–1)
IMM GRANULOCYTES NFR BLD: 0.5 %
LDLC SERPL CALC-MCNC: 159 MG/DL (ref ?–100)
LYMPHOCYTES # BLD AUTO: 1.18 X10(3) UL (ref 1–4)
LYMPHOCYTES NFR BLD AUTO: 27.6 %
MCH RBC QN AUTO: 30.6 PG (ref 26–34)
MCHC RBC AUTO-ENTMCNC: 32.6 G/DL (ref 31–37)
MCV RBC AUTO: 93.8 FL
MICROALBUMIN UR-MCNC: 0.4 MG/DL
MICROALBUMIN/CREAT 24H UR-RTO: 4.1 UG/MG (ref ?–30)
MONOCYTES # BLD AUTO: 0.52 X10(3) UL (ref 0.1–1)
MONOCYTES NFR BLD AUTO: 12.2 %
NEUTROPHILS # BLD AUTO: 2.41 X10 (3) UL (ref 1.5–7.7)
NEUTROPHILS # BLD AUTO: 2.41 X10(3) UL (ref 1.5–7.7)
NEUTROPHILS NFR BLD AUTO: 56.4 %
NONHDLC SERPL-MCNC: 178 MG/DL (ref ?–130)
OSMOLALITY SERPL CALC.SUM OF ELEC: 285 MOSM/KG (ref 275–295)
PLATELET # BLD AUTO: 295 10(3)UL (ref 150–450)
POTASSIUM SERPL-SCNC: 4.5 MMOL/L (ref 3.5–5.1)
PROT SERPL-MCNC: 7.7 G/DL (ref 5.7–8.2)
RBC # BLD AUTO: 4.32 X10(6)UL
SODIUM SERPL-SCNC: 138 MMOL/L (ref 136–145)
TRIGL SERPL-MCNC: 107 MG/DL (ref 30–149)
TSI SER-ACNC: 1.43 UIU/ML (ref 0.55–4.78)
VLDLC SERPL CALC-MCNC: 21 MG/DL (ref 0–30)
WBC # BLD AUTO: 4.3 X10(3) UL (ref 4–11)

## 2025-03-04 PROCEDURE — 82570 ASSAY OF URINE CREATININE: CPT

## 2025-03-04 PROCEDURE — 82043 UR ALBUMIN QUANTITATIVE: CPT

## 2025-03-04 PROCEDURE — 84443 ASSAY THYROID STIM HORMONE: CPT

## 2025-03-04 PROCEDURE — 36415 COLL VENOUS BLD VENIPUNCTURE: CPT

## 2025-03-04 PROCEDURE — 83036 HEMOGLOBIN GLYCOSYLATED A1C: CPT

## 2025-03-04 PROCEDURE — 85025 COMPLETE CBC W/AUTO DIFF WBC: CPT

## 2025-03-04 PROCEDURE — 80061 LIPID PANEL: CPT

## 2025-03-04 PROCEDURE — 80053 COMPREHEN METABOLIC PANEL: CPT

## 2025-03-11 ENCOUNTER — OFFICE VISIT (OUTPATIENT)
Dept: INTERNAL MEDICINE CLINIC | Facility: CLINIC | Age: 80
End: 2025-03-11
Payer: MEDICARE

## 2025-03-11 VITALS
HEART RATE: 58 BPM | WEIGHT: 161 LBS | BODY MASS INDEX: 26.5 KG/M2 | TEMPERATURE: 98 F | OXYGEN SATURATION: 97 % | DIASTOLIC BLOOD PRESSURE: 60 MMHG | HEIGHT: 65.55 IN | RESPIRATION RATE: 16 BRPM | SYSTOLIC BLOOD PRESSURE: 116 MMHG

## 2025-03-11 DIAGNOSIS — I10 BENIGN ESSENTIAL HTN: ICD-10-CM

## 2025-03-11 DIAGNOSIS — Z78.0 POSTMENOPAUSAL: ICD-10-CM

## 2025-03-11 DIAGNOSIS — E78.00 HIGH CHOLESTEROL: ICD-10-CM

## 2025-03-11 DIAGNOSIS — Z96.652 S/P TOTAL KNEE ARTHROPLASTY, LEFT: ICD-10-CM

## 2025-03-11 DIAGNOSIS — Z86.711 HISTORY OF PULMONARY EMBOLUS (PE): ICD-10-CM

## 2025-03-11 DIAGNOSIS — Z79.01 CHRONIC ANTICOAGULATION: ICD-10-CM

## 2025-03-11 DIAGNOSIS — Z00.00 ENCOUNTER FOR MEDICARE ANNUAL WELLNESS EXAM: Primary | ICD-10-CM

## 2025-03-11 DIAGNOSIS — G62.9 SMALL FIBER NEUROPATHY: ICD-10-CM

## 2025-03-11 DIAGNOSIS — Z12.31 ENCOUNTER FOR SCREENING MAMMOGRAM FOR MALIGNANT NEOPLASM OF BREAST: ICD-10-CM

## 2025-03-11 DIAGNOSIS — R73.9 BLOOD GLUCOSE ELEVATED: ICD-10-CM

## 2025-03-11 DIAGNOSIS — D68.52 PROTHROMBIN GENE MUTATION (HCC): ICD-10-CM

## 2025-03-11 PROCEDURE — G0439 PPPS, SUBSEQ VISIT: HCPCS | Performed by: INTERNAL MEDICINE

## 2025-03-11 NOTE — PROGRESS NOTES
Subjective:   Carlota Martinez is a 79 year old female who presents for a Medicare Subsequent Annual Wellness visit (Pt already had Initial Annual Wellness) and scheduled follow up of multiple significant but stable problems.   1. Encounter for Medicare annual wellness exam (Primary)- referred for mammogram and dexa, screening colonoscopy no longer indicated.  She is up to date on her vaccinations.  2. Benign essential HTN- controlled, no HA/DZ/CP  3. Small fiber neuropathy- controlled on gabapentin  4. S/P total knee arthroplasty, left- doing very well, no acute complaints  5. Prothrombin gene mutation (HCC)- on chronic anticoagulation  6. History of pulmonary embolus (PE)- as above, no pulm complaints  7. Chronic anticoagulation  8. Encounter for screening mammogram for malignant neoplasm of breast  -     [facility] CLYDE 2D+3D SCREENING BILAT (CPT=77067/18397); Future; Expected date: 03/11/2025  9. Postmenopausal  -     XR DEXA BONE DENSITOMETRY (CPT=77080); Future; Expected date: 03/11/2025  10. Blood glucose elevated- watch diet, monitor hgba1c. Consider metformin if not improving  11. High cholesterol- LDL increased significantly from previous lipid profile, discussed heart healthy diet and rpt lipids in 6 months  -     Lipid Panel; Future; Expected date: 09/11/2025     History/Other:   Fall Risk Assessment:   She has been screened for Falls and is low risk.      Cognitive Assessment:   She had a completely normal cognitive assessment - see flowsheet entries     Functional Ability/Status:   Carlota Martinez has a completely normal functional assessment. See flowsheet for details.      Depression Screening (PHQ):  PHQ-2 SCORE: 0  , done 3/3/2025   Last [place] Suicide Screening on 3/11/2025 was No Risk.         Advanced Directives:   She does have a Living Will but we do NOT have it on file in Epic.    She has a Power of  for Health Care on file in [employer].  Not discussed      Patient Active Problem List    Diagnosis    Benign essential HTN    Prothrombin gene mutation (HCC)    Chronic anticoagulation    History of pulmonary embolus (PE)    Numbness of feet    Pre-diabetes    Encounter for anticoagulation discussion and counseling    Paresthesia of both feet    Small fiber neuropathy    Osteoarthritis of left knee    S/P total knee arthroplasty, left     Allergies:  She is allergic to other and seasonal.    Current Medications:  Outpatient Medications Marked as Taking for the 3/11/25 encounter (Office Visit) with Elodia Brock MD   Medication Sig    apixaban (ELIQUIS) 2.5 MG Oral Tab Take 1 tablet (2.5 mg total) by mouth 2 (two) times daily.    Turmeric (QC TUMERIC COMPLEX) 500 MG Oral Cap Take 250 mg by mouth in the morning and 250 mg before bedtime.    Multiple Vitamins-Minerals (HAIR SKIN & NAILS OR) Take 1 tablet by mouth in the morning and 1 tablet before bedtime.    gabapentin 100 MG Oral Cap Take 1 capsule (100 mg total) by mouth 2 (two) times daily.    Alpha-Lipoic Acid 200 MG Oral Tab Take 1 tablet by mouth in the morning and 1 tablet before bedtime.    cyanocobalamin 500 MCG Oral Tab Take 1 tablet (500 mcg total) by mouth every morning.    AMLODIPINE 5 MG Oral Tab TAKE 1 TABLET BY MOUTH DAILY    METOPROLOL TARTRATE 50 MG Oral Tab TAKE ONE-HALF TABLET BY MOUTH  TWICE DAILY    MONTELUKAST 10 MG Oral Tab TAKE 1 TABLET BY MOUTH DAILY AS  NEEDED    Neomycin-Polymyxin-HC 1 % Otic Solution 4 drops to affected ear TID for 10 days, may repeat course as directed    Vitamin D3, Cholecalciferol, 1000 UNITS Oral Cap Take 2 capsules (2,000 Units total) by mouth daily.    OCUVITE Oral Tab Take 1 tablet by mouth in the morning and 1 tablet before bedtime.       Medical History:  She  has a past medical history of Blood disorder, DVT, bilateral lower limbs (HCC) (03/12/2016), Essential hypertension, High blood pressure, History of DVT of lower extremity (03/12/2016), Prediabetes, Pulmonary embolism (HCC), Pulmonary  embolism with acute cor pulmonale (HCC) (03/12/2016), and Visual impairment.  Surgical History:  She  has a past surgical history that includes other surgical history (03/12/2016); colonoscopy; colonoscopy (N/A, 11/11/2021); and knee replacement surgery (Left, 11/15/2024).   Family History:  Her family history includes Asthma in her paternal grandfather; Breast Cancer (age of onset: 60) in her maternal cousin female; Cancer in her maternal grandfather and mother; Cancer (age of onset: 90) in her father; Diabetes in her sister; Fibromyalgia in her sister; Heart Disorder in her father, maternal grandmother, and mother; High Blood Pressure in her father and mother; Other in her father and sister.  Social History:  She  reports that she has never smoked. She has never been exposed to tobacco smoke. She has never used smokeless tobacco. She reports that she does not currently use alcohol. She reports that she does not use drugs.    Tobacco:  She has never smoked tobacco.    CAGE Alcohol Screen:   CAGE screening score of 0 on 3/3/2025, showing low risk of alcohol abuse.      Patient Care Team:  Elodia Brock MD as PCP - General  Clint Alcazar MD (ONCOLOGY)  Anette Russo MD as Consulting Physician (NEUROLOGY)    Review of Systems     Negative for f/c/CP/abd pain/n/v/focal weakness  +allergies this time of year    Objective:   Physical Exam  GENERAL: well developed, well nourished,in no apparent distress  HEENT: atraumatic, normocephalic  NECK: supple,no adenopathy  LUNGS: normal rate without respiratory distress, lungs clear to auscultation  CARDIO: RRR nl S1 S2  GI: normal bowel sounds, soft, NT/ND  EXTREMITIES: left knee with mild swelling, no redness, good ROM  NEURO: Alert and oriented  /60 (BP Location: Right arm, Patient Position: Sitting, Cuff Size: adult)   Pulse 58   Temp 98.3 °F (36.8 °C) (Oral)   Resp 16   Ht 5' 5.55\" (1.665 m)   Wt 161 lb (73 kg)   LMP  (LMP Unknown)   SpO2 97%    Breastfeeding No   BMI 26.34 kg/m²  Estimated body mass index is 26.34 kg/m² as calculated from the following:    Height as of this encounter: 5' 5.55\" (1.665 m).    Weight as of this encounter: 161 lb (73 kg).    Medicare Hearing Assessment:   Hearing Screening    Time taken: 3/11/2025 12:07 PM  Entry User: Maddie Vizcarra CMA  Screening Method: Finger Rub  Finger Rub Result: Pass         Visual Acuity:   Right Eye Visual Acuity: Uncorrected Right Eye Chart Acuity: 20/40   Left Eye Visual Acuity: Uncorrected Left Eye Chart Acuity: 20/30   Both Eyes Visual Acuity: Uncorrected Both Eyes Chart Acuity: 20/30   Able To Tolerate Visual Acuity: Yes        Assessment & Plan:   Carlota Martinez is a 79 year old female who presents for a Medicare Assessment.     1. Encounter for Medicare annual wellness exam (Primary)- referred for mammogram and dexa, screening colonoscopy no longer indicated.  She is up to date on her vaccinations.  2. Benign essential HTN- controlled, CPM  3. Small fiber neuropathy- controlled on gabapentin  4. S/P total knee arthroplasty, left- doing very well, no acute complaints  5. Prothrombin gene mutation (HCC)- on chronic anticoagulation  6. History of pulmonary embolus (PE)- as above  7. Chronic anticoagulation  8. Encounter for screening mammogram for malignant neoplasm of breast  -     [facility] CLYDE 2D+3D SCREENING BILAT (CPT=77067/98904); Future; Expected date: 03/11/2025  9. Postmenopausal  -     XR DEXA BONE DENSITOMETRY (CPT=77080); Future; Expected date: 03/11/2025  10. Blood glucose elevated- watch diet, monitor hgba1c. Consider metformin if not improving  -     Hemoglobin A1C; Future; Expected date: 09/11/2025  11. High cholesterol- LDL increased significantly from previous lipid profile, discussed heart healthy diet and rpt lipids in 6 months  -     Lipid Panel; Future; Expected date: 09/11/2025    The patient indicates understanding of these issues and agrees to the plan.  Continue with  current treatment plan.  Reinforced healthy diet, lifestyle, and exercise.      Return in about 6 months (around 9/11/2025), or if symptoms worsen or fail to improve, for follow up labs.     Elodia Brock MD, 3/11/2025     Supplementary Documentation:   General Health:  In the past six months, have you lost more than 10 pounds without trying?: (Patient-Rptd) 2 - No  Has your appetite been poor?: (Patient-Rptd) No  Type of Diet: (Patient-Rptd) Balanced  How does the patient maintain a good energy level?: (Patient-Rptd) Stretching, Other  How would you describe your daily physical activity?: (Patient-Rptd) Moderate  How would you describe your current health state?: (Patient-Rptd) Good  How do you maintain positive mental well-being?: (Patient-Rptd) Social Interaction, Visiting Family  On a scale of 0 to 10, with 0 being no pain and 10 being severe pain, what is your pain level?: (Patient-Rptd) 0 - (None)  In the past six months, have you experienced urine leakage?: (Patient-Rptd) 1-Yes  At any time do you feel concerned for the safety/well-being of yourself and/or your children, in your home or elsewhere?: No  Have you had any immunizations at another office such as Influenza, Hepatitis B, Tetanus, or Pneumococcal?: (Patient-Rptd) Yes    Health Maintenance   Topic Date Due    Annual Physical  11/15/2024    Annual Depression Screening  01/01/2025    COVID-19 Vaccine (8 - 2024-25 season) 03/30/2025    Influenza Vaccine  Completed    DEXA Scan  Completed    Fall Risk Screening (Annual)  Completed    Pneumococcal Vaccine: 50+ Years  Completed    Zoster Vaccines  Completed    Meningococcal B Vaccine  Aged Out    Colorectal Cancer Screening  Discontinued    Mammogram  Discontinued

## 2025-03-17 ENCOUNTER — HOSPITAL ENCOUNTER (OUTPATIENT)
Dept: BONE DENSITY | Age: 80
Discharge: HOME | End: 2025-03-17
Attending: INTERNAL MEDICINE
Payer: MEDICARE

## 2025-03-17 DIAGNOSIS — Z78.0 POSTMENOPAUSAL: ICD-10-CM

## 2025-03-17 PROCEDURE — 77080 DXA BONE DENSITY AXIAL: CPT | Performed by: INTERNAL MEDICINE

## 2025-04-07 ENCOUNTER — OFFICE VISIT (OUTPATIENT)
Age: 80
End: 2025-04-07
Attending: INTERNAL MEDICINE
Payer: MEDICARE

## 2025-04-07 VITALS
WEIGHT: 161.63 LBS | HEART RATE: 56 BPM | OXYGEN SATURATION: 97 % | SYSTOLIC BLOOD PRESSURE: 134 MMHG | BODY MASS INDEX: 26 KG/M2 | RESPIRATION RATE: 18 BRPM | TEMPERATURE: 97 F | DIASTOLIC BLOOD PRESSURE: 74 MMHG

## 2025-04-07 DIAGNOSIS — Z86.711 HISTORY OF PULMONARY EMBOLUS (PE): Primary | ICD-10-CM

## 2025-04-07 DIAGNOSIS — D68.52 PROTHROMBIN GENE MUTATION (HCC): ICD-10-CM

## 2025-04-07 DIAGNOSIS — M17.12 OSTEOARTHRITIS OF LEFT KNEE: ICD-10-CM

## 2025-04-07 DIAGNOSIS — Z79.01 CHRONIC ANTICOAGULATION: ICD-10-CM

## 2025-04-07 DIAGNOSIS — I26.09 OTHER PULMONARY EMBOLISM WITH ACUTE COR PULMONALE, UNSPECIFIED CHRONICITY (HCC): ICD-10-CM

## 2025-04-07 NOTE — PROGRESS NOTES
Education Record     Learner:  Patient     Disease / Diagnosis: DVT/PE     Barriers / Limitations:  None                Comments:     Method:  Brief focused                Comments:     General Topics:  Plan of care reviewed                Comments:     Outcome:  Shows understanding                Comments: pt here for 6 month f/up. Pt states she is taking eliquis as instructed without complications. had knee surgery in November and is doing well.

## 2025-04-07 NOTE — PROGRESS NOTES
Hematology Clinic Follow Up Visit    Patient Name: Carlota Martinez  Medical Record Number: [id number]   YOB: 1945    PCP: Dr. Elodia Brock  Other providers:  Dr. Sahu (cardiology/vascular)    Reason for Consultation:  Carlota Martinez was seen today for the diagnosis of PE and DVT    Hematologic History:  *Unprovoked Acute massive PE and BLE DVT  -3/12/16- presented to ED with dyspnea, hypotension, tachycardia.  CTA showed massive bilateral proximal PEs (but not saddle), BLE venous doppler showed bilateral distal DVTs.  TTE showed LVEF of 55-60%, increased size of RV with elevated RVSP.  Underwent CDT to pulmonary arteries (Dr. Sahu) then transitioned to UFH gtt   -3/15/16- transitioned to eliquis    =============================  Interval events:  Presents for follow up.  She remains on eliquis 2.5mg BID without any bleeding or excessive bruising.  No dyspnea, chest pain, or LE edema.  She denies any leg pains.  She underwent a left knee replacement in November with excellent improvement.      No additional future surgeries or procedures planned.    Past Medical History:  Past Medical History:    Blood disorder    DVT, bilateral lower limbs (HCC)    Essential hypertension    High blood pressure    History of DVT of lower extremity    Prediabetes    Pulmonary embolism (HCC)    Pulmonary embolism with acute cor pulmonale (HCC)    Visual impairment    contacts and glasses     Past Surgical History:   Procedure Laterality Date    Colonoscopy      Colonoscopy N/A 11/11/2021    Procedure: COLONOSCOPY with biopsy and forcep polypectomy;  Surgeon: Benjamín Diehl MD;  Location:  ENDOSCOPY    Knee replacement surgery Left 11/15/2024        Other surgical history  03/12/2016    catheters inserted into lungs     Home Medications:   apixaban (ELIQUIS) 2.5 MG Oral Tab Take 1 tablet (2.5 mg total) by mouth 2 (two) times daily.      Turmeric (QC TUMERIC COMPLEX) 500 MG Oral  Cap Take 250 mg by mouth in the morning and 250 mg before bedtime.      Multiple Vitamins-Minerals (HAIR SKIN & NAILS OR) Take 1 tablet by mouth in the morning and 1 tablet before bedtime.      gabapentin 100 MG Oral Cap Take 1 capsule (100 mg total) by mouth 2 (two) times daily. 20 capsule 0    Alpha-Lipoic Acid 200 MG Oral Tab Take 1 tablet by mouth in the morning and 1 tablet before bedtime.      cyanocobalamin 500 MCG Oral Tab Take 1 tablet (500 mcg total) by mouth every morning.      AMLODIPINE 5 MG Oral Tab TAKE 1 TABLET BY MOUTH DAILY 90 tablet 3    METOPROLOL TARTRATE 50 MG Oral Tab TAKE ONE-HALF TABLET BY MOUTH  TWICE DAILY 90 tablet 3    MONTELUKAST 10 MG Oral Tab TAKE 1 TABLET BY MOUTH DAILY AS  NEEDED 90 tablet 3    Neomycin-Polymyxin-HC 1 % Otic Solution 4 drops to affected ear TID for 10 days, may repeat course as directed 30 mL 1    Vitamin D3, Cholecalciferol, 1000 UNITS Oral Cap Take 2 capsules (2,000 Units total) by mouth daily.      Multiple Vitamins-Minerals (SENTRY SENIOR) Oral Tab Take 1 tablet by mouth in the morning and 1 tablet before bedtime.      OCUVITE Oral Tab Take 1 tablet by mouth in the morning and 1 tablet before bedtime.       Allergies:   Allergies   Allergen Reactions    Other UNKNOWN     GRASS ,Hay fever    Seasonal OTHER (SEE COMMENTS)       Psychosocial History:  Social History     Social History Narrative    . Her   of lung cancer, he was a smoker. She has 1 child and 2 grandchildren. Carlota worked in IT, retired 2019.      Social History     Socioeconomic History    Marital status:    Tobacco Use    Smoking status: Never     Passive exposure: Never    Smokeless tobacco: Never    Tobacco comments:     Updated 24   Vaping Use    Vaping status: Never Used   Substance and Sexual Activity    Alcohol use: Not Currently     Comment: CAGE 3/11/25    Drug use: Never    Sexual activity: Yes     Partners: Male   Other Topics Concern    Caffeine Concern  Yes     Comment: coffee    Stress Concern No    Weight Concern No    Special Diet No    Exercise No    Seat Belt Yes   Social History Narrative    . Her   of lung cancer, he was a smoker. She has 1 child and 2 grandchildren. Carlota worked in IT, retired 2019.      Social Drivers of Health     Food Insecurity: No Food Insecurity (3/11/2025)    NCSS - Food Insecurity     Worried About Running Out of Food in the Last Year: No     Ran Out of Food in the Last Year: No   Transportation Needs: No Transportation Needs (3/11/2025)    NCSS - Transportation     Lack of Transportation: No   Housing Stability: Not At Risk (3/11/2025)    NCSS - Housing/Utilities     Has Housing: Yes     Worried About Losing Housing: No     Unable to Get Utilities: No     Family Medical History:  Family History   Problem Relation Age of Onset    Cancer Father 90        prostate     Heart Disorder Father     High Blood Pressure Father     Other (Other) Father     Cancer Mother         lung, was a smoker    Heart Disorder Mother     High Blood Pressure Mother     Heart Disorder Maternal Grandmother     Cancer Maternal Grandfather     Asthma Paternal Grandfather     Other (Other) Sister     Diabetes Sister     Fibromyalgia Sister     Breast Cancer Maternal Cousin Female 60    Clotting Disorder Neg        Review of Systems:  A 10-point ROS was done with pertinent positives and negative per the HPI    Vital Signs:  Height: --  Weight: 73.3 kg (161 lb 9.6 oz) ( 1028)  BSA (Calculated - sq m): --  Pulse: 56 ( 1028)  BP: 134/74 ( 1028)  Temp: 97.3 °F (36.3 °C) ( 1028)  Do Not Use - Resp Rate: --  SpO2: 97 % ( 1028)    Wt Readings from Last 6 Encounters:   25 73.3 kg (161 lb 9.6 oz)   25 73 kg (161 lb)   24 71.1 kg (156 lb 11.2 oz)   11/15/24 73 kg (161 lb)   10/28/24 73.8 kg (162 lb 12.8 oz)   24 72.6 kg (160 lb)     Physical Examination:  General: Patient is alert and oriented, not in  acute distress  Psych: Mood and affect are appropriate  Eyes: EOMI  ENT: Oropharynx is clear  CV: Regular rate and rhythm, no murmurs  Respiratory: Lungs clear to auscultation bilaterally  GI/Abd: Soft, non-tender with normoactive bowel sounds, no hepatosplenomegaly  Neurological: Grossly intact   Lymphatics: No palpable lymphadenopathy  Skin: no rashes or petechiae  Ext: trace symmetric BLE edema, no calf tenderness    Laboratory:  Lab Results   Component Value Date    WBC 4.3 03/04/2025    WBC 6.6 10/28/2024    WBC 8.2 08/12/2024    HGB 13.2 03/04/2025    HGB 12.5 10/28/2024    HGB 12.8 08/12/2024    HCT 40.5 03/04/2025    MCV 93.8 03/04/2025    MCH 30.6 03/04/2025    MCHC 32.6 03/04/2025    RDW 13.1 03/04/2025    .0 03/04/2025    .0 10/28/2024    .0 08/12/2024     Lab Results   Component Value Date     (H) 03/04/2025    BUN 10 03/04/2025    BUNCREA 13.3 07/21/2021    CREATSERUM 0.84 03/04/2025    CREATSERUM 0.66 10/28/2024    CREATSERUM 0.67 08/12/2024    ANIONGAP 11 03/04/2025    GFR 79 09/21/2017    GFRNAA 78 07/27/2022    GFRAA 90 07/27/2022    CA 10.0 03/04/2025    OSMOCALC 285 03/04/2025    ALKPHO 66 03/04/2025    AST 19 03/04/2025    ALT 13 03/04/2025    BILT 0.8 03/04/2025    TP 7.7 03/04/2025    ALB 4.6 03/04/2025    GLOBULIN 3.1 03/04/2025     03/04/2025    K 4.5 03/04/2025    CL 99 03/04/2025    CO2 28.0 03/04/2025     Lab Results   Component Value Date    PTT 30.9 10/28/2024    INR 1.08 10/28/2024     Component      Latest Ref Rng 3/14/2016   STACLOT LUPUS ANTICOAGULANT      Negative Positive (A)   PATHOLOGIST INTERPRETATION       Results indicate the presence of a lupus anticoagulant. Repeat testing in 12 weeks is recommended to rule out transient antibodies that are considered non-pathologic. . . .   DRVVT LUPUS ANTICOAGULANT      Negative Negative   PTT (LUPUS)      25.0-34.0 seconds 276.2 (H)   PTT (Hepzyme)      25-34 seconds 49.0 (H)   STACLOT LA DELTA       <=8.00 seconds 15.30 (H)   DRVVT RATIO      0.00-1.29 1.05   BETA 2 GLYCOPROTEIN 1 AB, IgM      <=15.0 U/mL <3.7   BETA 2 GLYCOPROTEIN 1 AB, IgG      <=15.0 U/mL <3.7   PHOSPHOLIPID AB, IgM      Negative Negative   PHOSPHOLIPID AB, IgG      Negative Negative     Component      Latest Ref Rng 3/30/2016   AT3 ACTIVITY      80.0-120.0 % 105.0   PROTEIN C ACTIVITY       % >150 (H)   FACTOR V LEIDEN (R506Q) MUT      No Mutation No Mutation   PROTHROMBIN [id number] MUTATION      No Mutation Heterozygous (A)     Component      Latest Ref Rng 6/20/2016   Protein S Free, Ag       % 89     Imaging:    As reviewed above    Impression & Plan:     *unprovoked BLE DVT + massive bilateral PE s/p CDT, dx'd 3/2016  -+heterozygous PT mutation, non-confirmed positive APLA test (was drawn prior to starting eliquis, but did require 2 hepzyme treatments as pt was on UFH gtt at the time which may possibly interfere with results).  She prefers to remain on eliquis despite discussing guidelines for warfarin if determined to have APLS (which she is not confirmed to have), therefore repeat APLA testing has not been performed.  She is up to date with age appropriate cancer screening, she lacks as signs/symptoms of an occult malignancy.    -given unprovoked nature of her clot, we have continued chronic anticoagulation  -tolerating eliquis well without any issues, will continue eliquis 2.5mg BID at this time.     -monitor CBC and CMP q6 months while on anticoagulation    RTC in 6 months    Clint Alcazar MD  Hematology/Medical Oncology  [facility]

## 2025-04-28 DIAGNOSIS — R20.0 NUMBNESS AND TINGLING OF BOTH FEET: Primary | ICD-10-CM

## 2025-04-28 DIAGNOSIS — R20.2 NUMBNESS AND TINGLING OF BOTH FEET: Primary | ICD-10-CM

## 2025-04-28 RX ORDER — GABAPENTIN 100 MG/1
100 CAPSULE ORAL 2 TIMES DAILY
Qty: 180 CAPSULE | Refills: 0 | Status: SHIPPED | OUTPATIENT
Start: 2025-04-28

## 2025-04-28 NOTE — TELEPHONE ENCOUNTER
Sent the patient a [employer] message to make an appointment for further medication refills.       Medication: GABAPENTIN 100 MG Oral Cap      Date of last refill: 11/05/2024 (#180/1)  [place]     Last office visit: 04/24/2024  Due back to clinic per last office note:  Around 05/22/2024  Date next office visit scheduled:    Future Appointments   Date Time Provider Department Center   10/6/2025 10:00 AM NP OOT NP  [facility]   10/6/2025 10:30 AM Clint Alcazar MD NP  [facility]   10/20/2025 11:20 AM EH NEIL RM1 [facility]           Last OV note recommendation:    ASSESSMENT/PLAN:          ICD-10-CM     1. Small fiber neuropathy  G62.9         2. Numbness and tingling of both feet  R20.0       R20.2                  Peripheral neuropathy of unclear etiology.  EMG and NCS was negative for any large fiber neuropathy  Small fiber neuropathy is still possible  Has superimposed chronic b/l L5 radiculopathies        Discussed skin nerve biopsy and patient would like to proceed     Continue diet modification for prediabetes     Advise proper foot wear and balance exercises     Will call her back for skin biopsy once PA is completed        See orders and medications filed with this encounter. The patient indicates understanding of these issues and agrees with the plan.

## 2025-05-21 ENCOUNTER — PATIENT MESSAGE (OUTPATIENT)
Dept: ORTHOPEDICS CLINIC | Facility: CLINIC | Age: 80
End: 2025-05-21

## 2025-06-27 ENCOUNTER — OFFICE VISIT (OUTPATIENT)
Dept: NEUROLOGY | Facility: CLINIC | Age: 80
End: 2025-06-27
Payer: MEDICARE

## 2025-06-27 VITALS
DIASTOLIC BLOOD PRESSURE: 70 MMHG | SYSTOLIC BLOOD PRESSURE: 120 MMHG | BODY MASS INDEX: 25.07 KG/M2 | RESPIRATION RATE: 16 BRPM | WEIGHT: 156 LBS | HEIGHT: 66 IN | OXYGEN SATURATION: 96 % | HEART RATE: 54 BPM

## 2025-06-27 DIAGNOSIS — R73.03 PRE-DIABETES: ICD-10-CM

## 2025-06-27 DIAGNOSIS — G62.9 SMALL FIBER NEUROPATHY: Primary | ICD-10-CM

## 2025-06-27 DIAGNOSIS — R20.0 NUMBNESS AND TINGLING OF BOTH FEET: ICD-10-CM

## 2025-06-27 DIAGNOSIS — R20.2 NUMBNESS AND TINGLING OF BOTH FEET: ICD-10-CM

## 2025-06-27 PROCEDURE — 99213 OFFICE O/P EST LOW 20 MIN: CPT | Performed by: OTHER

## 2025-06-27 RX ORDER — GABAPENTIN 100 MG/1
100 CAPSULE ORAL 2 TIMES DAILY
Qty: 180 CAPSULE | Refills: 3 | Status: SHIPPED | OUTPATIENT
Start: 2025-06-27

## 2025-06-27 NOTE — PROGRESS NOTES
The following individual(s) verbally consented to be recorded using ambient AI listening technology and understand that they can each withdraw their consent to this listening technology at any point by asking the clinician to turn off or pause the recording:    Patient name: Carlota Das Martinez  Additional names:

## 2025-06-27 NOTE — PATIENT INSTRUCTIONS
Refill policies:    Allow 2-3 business days for refills; controlled substances may take longer.  Contact your pharmacy at least 5 days prior to running out of medication and have them send an electronic request or submit request through the “request refill” option in your [employer] account.  Refills are not addressed on weekends; covering physicians do not authorize routine medications on weekends.  No narcotics or controlled substances are refilled after noon on Fridays or by on call physicians.  By law, narcotics must be electronically prescribed.  A 30 day supply with no refills is the maximum allowed.  If your prescription is due for a refill, you may be due for a follow up appointment.  To best provide you care, patients receiving routine medications need to be seen at least once a year.  Patients receiving narcotic/controlled substance medications need to be seen at least once every 3 months.  In the event that your preferred pharmacy does not have the requested medication in stock (e.g. Backordered), it is your responsibility to find another pharmacy that has the requested medication available.  We will gladly send a new prescription to that pharmacy at your request.    Scheduling Tests:    If your physician has ordered radiology tests such as MRI or CT scans, please contact Central Scheduling at [phone] right away to schedule the test.  Once scheduled, the [facility] Centralized Referral Team will work with your insurance carrier to obtain pre-certification or prior authorization.  Depending on your insurance carrier, approval may take 3-10 days.  It is highly recommended patients assure they have received an authorization before having a test performed.  If test is done without insurance authorization, patient may be responsible for the entire amount billed.      Precertification and Prior Authorizations:  If your physician has recommended that you have a procedure or additional testing performed the [facility]  Centralized Referral Team will contact your insurance carrier to obtain pre-certification or prior authorization.    You are strongly encouraged to contact your insurance carrier to verify that your procedure/test has been approved and is a COVERED benefit.  Although the [facility] Centralized Referral Team does its due diligence, the insurance carrier gives the disclaimer that \"Although the procedure is authorized, this does not guarantee payment.\"    Ultimately the patient is responsible for payment.   Thank you for your understanding in this matter.  Paperwork Completion:  If you require FMLA or disability paperwork for your recovery, please make sure to either drop it off or have it faxed to our office at [phone]. Be sure the form has your name and date of birth on it.  The form will be faxed to our Forms Department and they will complete it for you.  There is a 25$ fee for all forms that need to be filled out.  Please be aware there is a 10-14 day turnaround time.  You will need to sign a release of information (PRINCE) form if your paperwork does not come with one.  You may call the Forms Department with any questions at [phone].  Their fax number is [phone].

## 2025-06-27 NOTE — PROGRESS NOTES
HPI:    Patient ID: Carlota Martinez is a 79 year old female.    Neurologic Problem  The patient's pertinent negatives include no weakness.     Patient is a 79 year old female who presents for follow up for small fiber neuropathy  Reports her symptoms are relatively under control and tolerating gabapentin fine.  Patient had an skin nerve biopsy performed which reported low normal low density in the left foot suggestive of an early small fiber neuropathy   Patient has prediabetes and the last hemoglobin tested in March 6.3. No new complaints        Carlota Martinez is a 78 year old female with history of  PE and DVT due to clotting disorder who presents for evaluation of numbness of both feet that has been going on several years and progressively getting worse. She states has noticed some footing issues but no significant balance problem. She denies any tingling and numbness in hands. No history of diabetes or prediabetes. History of prothrombin gene mutation and had DVT and PE in 2016 and since then on Eliquis.      HISTORY:  Past Medical History:    Blood disorder    DVT, bilateral lower limbs (HCC)    Essential hypertension    High blood pressure    History of DVT of lower extremity    Prediabetes    Pulmonary embolism (HCC)    Pulmonary embolism with acute cor pulmonale (HCC)    Visual impairment    contacts and glasses      Past Surgical History:   Procedure Laterality Date    Colonoscopy      Colonoscopy N/A 11/11/2021    Procedure: COLONOSCOPY with biopsy and forcep polypectomy;  Surgeon: Benjamín Diehl MD;  Location:  ENDOSCOPY    Knee replacement surgery Left 11/15/2024        Other surgical history  03/12/2016    catheters inserted into lungs      Family History   Problem Relation Age of Onset    Cancer Father 90        prostate     Heart Disorder Father     High Blood Pressure Father     Other (Other) Father     Cancer Mother         lung, was a smoker    Heart Disorder Mother     High  Blood Pressure Mother     Heart Disorder Maternal Grandmother     Cancer Maternal Grandfather     Asthma Paternal Grandfather     Other (Other) Sister     Diabetes Sister     Fibromyalgia Sister     Breast Cancer Maternal Cousin Female 60    Clotting Disorder Neg       Social History     Socioeconomic History    Marital status:    Tobacco Use    Smoking status: Never     Passive exposure: Never    Smokeless tobacco: Never    Tobacco comments:     Updated 24   Vaping Use    Vaping status: Never Used   Substance and Sexual Activity    Alcohol use: Not Currently     Comment: CAGE 3/11/25    Drug use: Never    Sexual activity: Yes     Partners: Male   Other Topics Concern    Caffeine Concern Yes     Comment: coffee    Stress Concern No    Weight Concern No    Special Diet No    Exercise No    Seat Belt Yes   Social History Narrative    . Her   of lung cancer, he was a smoker. She has 1 child and 2 grandchildren. Carlota worked in IT, retired 2019.      Social Drivers of Health     Food Insecurity: No Food Insecurity (3/11/2025)    NCSS - Food Insecurity     Worried About Running Out of Food in the Last Year: No     Ran Out of Food in the Last Year: No   Transportation Needs: No Transportation Needs (3/11/2025)    NCSS - Transportation     Lack of Transportation: No   Housing Stability: Not At Risk (3/11/2025)    NCSS - Housing/Utilities     Has Housing: Yes     Worried About Losing Housing: No     Unable to Get Utilities: No        Review of Systems   Constitutional: Negative.    HENT: Negative.     Eyes: Negative.    Respiratory: Negative.     Cardiovascular: Negative.    Gastrointestinal: Negative.    Endocrine: Negative.    Genitourinary: Negative.    Musculoskeletal: Negative.    Skin: Negative.    Allergic/Immunologic: Negative.    Neurological:  Positive for numbness. Negative for weakness.   Hematological: Negative.    Psychiatric/Behavioral: Negative.     All other systems reviewed  and are negative.           Current Outpatient Medications   Medication Sig Dispense Refill    GABAPENTIN 100 MG Oral Cap TAKE 1 CAPSULE BY MOUTH TWICE  DAILY 180 capsule 0    apixaban (ELIQUIS) 2.5 MG Oral Tab Take 1 tablet (2.5 mg total) by mouth 2 (two) times daily. 180 tablet 3    Turmeric (QC TUMERIC COMPLEX) 500 MG Oral Cap Take 250 mg by mouth in the morning and 250 mg before bedtime.      Multiple Vitamins-Minerals (HAIR SKIN & NAILS OR) Take 1 tablet by mouth in the morning and 1 tablet before bedtime.      Alpha-Lipoic Acid 200 MG Oral Tab Take 1 tablet by mouth in the morning and 1 tablet before bedtime.      cyanocobalamin 500 MCG Oral Tab Take 1 tablet (500 mcg total) by mouth every morning.      AMLODIPINE 5 MG Oral Tab TAKE 1 TABLET BY MOUTH DAILY 90 tablet 3    METOPROLOL TARTRATE 50 MG Oral Tab TAKE ONE-HALF TABLET BY MOUTH  TWICE DAILY 90 tablet 3    MONTELUKAST 10 MG Oral Tab TAKE 1 TABLET BY MOUTH DAILY AS  NEEDED 90 tablet 3    Neomycin-Polymyxin-HC 1 % Otic Solution 4 drops to affected ear TID for 10 days, may repeat course as directed 30 mL 1    Vitamin D3, Cholecalciferol, 1000 UNITS Oral Cap Take 2 capsules (2,000 Units total) by mouth daily.      OCUVITE Oral Tab Take 1 tablet by mouth in the morning and 1 tablet before bedtime.       Allergies:  Allergies   Allergen Reactions    Other UNKNOWN     GRASS ,Hay fever    Seasonal OTHER (SEE COMMENTS)     PHYSICAL EXAM:   Physical Exam    Blood pressure 120/70, pulse 54, resp. rate 16, height 66\", weight 156 lb (70.8 kg), SpO2 96%, not currently breastfeeding.    General Appearance: Well nourished, well developed, no apparent distress.   HEENT: Normocephalic and atraumatic.   Cardiovascular: Normal rate, regular rhythm and normal heart sounds.    Pulmonary/Chest: Effort normal and breath sounds normal.   Abdominal: Soft. Bowel sounds are normal.   Skin: dry, clean and intact  Ext: peripheral pulses present  Psych: normal mood and  affect    Neurological:  Patient is awake, alert and oriented to person, place and time   Normal memory, attention/concentration, speech and language.    Cranial Nerves:   II: Visual fields: normal  III: Pupils: equal, round, reactive to light  III,IV,VI: Extra Ocular Movements: intact  V: Facial sensation: intact  VII: Facial strength: intact  VIII: Hearing: intact  IX: Palate: intact  XI: Shoulder shrug: intact  XII: Tongue movement: normal    Motor: Strength is  5 out of 5 in all extremities bilaterally. + Hammer toes  DTR: 2+ triceps, 1+biceps, 3+ patellar. Absent achilles bilaterally    Sensory: Sensory examination is normal to light touch and pinprick except slightly decrease sensation to pinprick in toes. Moderate to severe vibration loss in feet. Position sense intact    Coordination: Finger-to-nose test normal bilaterally without evidence of dysmetria.    Gait: normal casual gait      TESTS/IMAGING:          Conclusion:  1.  This is an abnormal study.  2.  There is electrophysiologic suggestion of a chronic right L5 and left L5-S1 lumbar radiculopathy.  3.  There is no evidence of a large fiber polyneuropathy on this study.            Component      Latest Ref Rn 10/16/2023   Vitamin B12      193 - 986 pg/mL 487          Component      Latest Ref [facility] 10/16/2023   Expanded TESFAYE Antibody Screen, IGG      <0.7 ug/l 0.20    Anti-dsDNA antibody      <10 IU/mL 0.6    Connective Tissue Disease Screen Interpretation      Negative  Negative        Component      Latest Ref [facility] 10/16/2023   PROTEIN, TOTAL      6.4 - 8.2 g/dL 7.6    Albumin      3.75 - 5.21 g/dL 4.42    ALPHA-1-GLOBULINS      0.19 - 0.46 g/dL 0.27    ALPHA-2-GLOBULINS      0.48 - 1.05 g/dL 0.64    BETA GLOBULINS      0.68 - 1.23 g/dL 0.99    GAMMA GLOBULINS      0.62 - 1.70 g/dL 1.28    ALBUMIN/GLOBULIN RATIO      1.00 - 2.00  1.39    SPE INTERPRETATION No apparent monoclonal protein on serum electrophoresis.…    IMMUNOFIXATION No monoclonal protein  detected by immunofixation.…    KAPPA FREE LIGHT CHAIN      0.330 - 1.940 mg/dL 1.902    LAMBDA FREE LIGHT CHAIN      0.571 - 2.630 mg/dL 2.275    KAPPA/LAMBDA FLC RATIO      0.26 - 1.65  0.84            ASSESSMENT/PLAN:       ICD-10-CM    1. Small fiber neuropathy  G62.9       2. Numbness and tingling of both feet  R20.0     R20.2           Small fiber neuropathy suspecting prediabetes related  Has superimposed chronic b/l L5 radiculopathies  EMG and NCS negative for large fiber peripheral neuropathy    Skin nerve biopsy of the performed in the left foot shows low normal nerve density and suggestive of early small fiber neuropathy    Continue gabapentin 100 mg twice daily    Continue diet modification for prediabetes, follow with PCP  Continue B12 and lipoic acid supplement    RTC in about 1 year    See orders and medications filed with this encounter. The patient indicates understanding of these issues and agrees with the plan.    Anette Russo MD  [facility] [place]      Meds This Visit:  Requested Prescriptions      No prescriptions requested or ordered in this encounter       Imaging & Referrals:  None     ID#[id number]

## 2025-07-13 ENCOUNTER — PATIENT MESSAGE (OUTPATIENT)
Dept: INTERNAL MEDICINE CLINIC | Facility: CLINIC | Age: 80
End: 2025-07-13

## 2025-07-15 ENCOUNTER — NURSE TRIAGE (OUTPATIENT)
Dept: INTERNAL MEDICINE CLINIC | Facility: CLINIC | Age: 80
End: 2025-07-15

## 2025-07-15 NOTE — TELEPHONE ENCOUNTER
Action Requested: Summary for Provider     []  Critical Lab, Recommendations Needed  [] Need Additional Advice  [x]   FYI    []   Need Orders  [] Need Medications Sent to Pharmacy  []  Other     SUMMARY: OV 7/15    Reason for call: trigger finger  Onset: 2 weeks    Per pt, possible trigger finger. Left middle finger. NO PAIN. No fever. No swelling. No known injury.   Informed no availability with PCP. Offered and scheduled with another provider pt has seen:  Future Appointments   Date Time Provider Department Center   7/17/2025 11:00 AM Sofia Levin PA-C [facility] [facility]     No further questions or concerns. Pt verbalized understanding and agreed with POC.    Reason for Disposition   Patient wants to be seen    Protocols used: Finger Pain-A-OH

## 2025-07-17 ENCOUNTER — OFFICE VISIT (OUTPATIENT)
Dept: INTERNAL MEDICINE CLINIC | Facility: CLINIC | Age: 80
End: 2025-07-17
Payer: MEDICARE

## 2025-07-17 VITALS
BODY MASS INDEX: 26 KG/M2 | HEART RATE: 60 BPM | RESPIRATION RATE: 16 BRPM | WEIGHT: 161.19 LBS | DIASTOLIC BLOOD PRESSURE: 80 MMHG | OXYGEN SATURATION: 97 % | TEMPERATURE: 97 F | SYSTOLIC BLOOD PRESSURE: 130 MMHG

## 2025-07-17 DIAGNOSIS — M65.332 TRIGGER MIDDLE FINGER OF LEFT HAND: Primary | ICD-10-CM

## 2025-07-17 PROCEDURE — G2211 COMPLEX E/M VISIT ADD ON: HCPCS | Performed by: PHYSICIAN ASSISTANT

## 2025-07-17 PROCEDURE — 99213 OFFICE O/P EST LOW 20 MIN: CPT | Performed by: PHYSICIAN ASSISTANT

## 2025-07-17 NOTE — PROGRESS NOTES
The following individual(s) verbally consented to be recorded using ambient AI listening technology and understand that they can each withdraw their consent to this listening technology at any point by asking the clinician to turn off or pause the recording:    Patient name: Carlota Martinez  Additional names:  none            Chief Complaint   Patient presents with    Finger Pain     Js room 15 . Lt middle trigger finger pain rates about 4, pain wakes her up at night , no injury x  3 wks       History of Present Illness  Carlota Martinez is a 79 year old female who presents with trigger finger of the left middle finger.    She experiences her left middle finger getting stuck in a bent position, particularly at night, requiring manual manipulation to straighten it. This issue disrupts her sleep, although she does not experience pain during the day. There is some sensitivity in the area, and she can sometimes trigger the condition herself during the day.    No pain during the day but the condition wakes her up at night.      Review of Systems   See HPI.  No other complaints today.    Past Medical History[1]    Problem List[2]    Current Medications[3]    Physical Exam  /80 (BP Location: Right arm, Patient Position: Sitting)   Pulse 60   Temp 97.2 °F (36.2 °C) (Tympanic)   Resp 16   Wt 161 lb 3.2 oz (73.1 kg)   LMP  (LMP Unknown)   SpO2 97%   BMI 26.02 kg/m²   Constitutional:  No distress.   HEENT:  Normocephalic and atraumatic.  MS:  L middle finger:  Full AROM without issues, no palpable abnormalities.    Skin: Skin is warm and dry. No rash noted. No erythema. No pallor.       Assessment & Plan  Trigger Finger  Trigger finger of the left middle finger, primarily nocturnal, causing mechanical locking without significant pain.  - Refer to orthopedic hand specialist for evaluation and possible injection treatment.        No orders of the defined types were placed in this encounter.      Meds &  Refills for this Visit:  Requested Prescriptions      No prescriptions requested or ordered in this encounter       Imaging & Consults:  ORTHOPEDIC - INTERNAL    Return if symptoms worsen or fail to improve.  There are no Patient Instructions on file for this visit.    All questions were answered and the patient understands the plan.        [1]   Past Medical History:   Blood disorder    DVT, bilateral lower limbs (HCC)    Essential hypertension    High blood pressure    History of DVT of lower extremity    Prediabetes    Pulmonary embolism (HCC)    Pulmonary embolism with acute cor pulmonale (HCC)    Visual impairment    contacts and glasses   [2]   Patient Active Problem List  Diagnosis    Benign essential HTN    Prothrombin gene mutation (HCC)    Chronic anticoagulation    History of pulmonary embolus (PE)    Numbness of feet    Pre-diabetes    Encounter for anticoagulation discussion and counseling    Paresthesia of both feet    Small fiber neuropathy    Osteoarthritis of left knee    S/P total knee arthroplasty, left   [3]   Current Outpatient Medications   Medication Sig Dispense Refill    gabapentin 100 MG Oral Cap Take 1 capsule (100 mg total) by mouth 2 (two) times daily. 180 capsule 3    apixaban (ELIQUIS) 2.5 MG Oral Tab Take 1 tablet (2.5 mg total) by mouth 2 (two) times daily. 180 tablet 3    Turmeric (QC TUMERIC COMPLEX) 500 MG Oral Cap Take 250 mg by mouth in the morning and 250 mg before bedtime.      Multiple Vitamins-Minerals (HAIR SKIN & NAILS OR) Take 1 tablet by mouth in the morning and 1 tablet before bedtime.      Alpha-Lipoic Acid 200 MG Oral Tab Take 1 tablet by mouth in the morning and 1 tablet before bedtime.      cyanocobalamin 500 MCG Oral Tab Take 1 tablet (500 mcg total) by mouth every morning.      AMLODIPINE 5 MG Oral Tab TAKE 1 TABLET BY MOUTH DAILY 90 tablet 3    METOPROLOL TARTRATE 50 MG Oral Tab TAKE ONE-HALF TABLET BY MOUTH  TWICE DAILY 90 tablet 3    MONTELUKAST 10 MG Oral Tab  TAKE 1 TABLET BY MOUTH DAILY AS  NEEDED 90 tablet 3    Neomycin-Polymyxin-HC 1 % Otic Solution 4 drops to affected ear TID for 10 days, may repeat course as directed 30 mL 1    Vitamin D3, Cholecalciferol, 1000 UNITS Oral Cap Take 2 capsules (2,000 Units total) by mouth daily.      OCUVITE Oral Tab Take 1 tablet by mouth in the morning and 1 tablet before bedtime.

## 2025-07-23 DIAGNOSIS — R20.0 NUMBNESS AND TINGLING OF BOTH FEET: ICD-10-CM

## 2025-07-23 DIAGNOSIS — R20.2 NUMBNESS AND TINGLING OF BOTH FEET: ICD-10-CM

## 2025-07-23 RX ORDER — GABAPENTIN 100 MG/1
100 CAPSULE ORAL 2 TIMES DAILY
Qty: 180 CAPSULE | Refills: 3 | Status: SHIPPED | OUTPATIENT
Start: 2025-07-23

## 2025-07-23 NOTE — TELEPHONE ENCOUNTER
Medication: GABAPENTIN 100 MG Oral Cap      Date of last refill: 06/27/2025 (#180/3)  [place]     Last office visit: 06/27/2025  Due back to clinic per last office note:  1 year  Date next office visit scheduled:    Future Appointments   Date Time Provider Department Center   8/1/2025 10:00 AM Danyell Lee PA [id number]   10/6/2025 10:00 AM NP OOT NP [facility]   10/6/2025 10:30 AM Clint Alcazar MD NP [facility]   10/20/2025 11:20 AM E[facility] NEIL RM1 [facility]           Last OV note recommendation:    ASSESSMENT/PLAN:          ICD-10-CM     1. Small fiber neuropathy  G62.9         2. Numbness and tingling of both feet  R20.0       R20.2               Small fiber neuropathy suspecting prediabetes related  Has superimposed chronic b/l L5 radiculopathies  EMG and NCS negative for large fiber peripheral neuropathy     Skin nerve biopsy of the performed in the left foot shows low normal nerve density and suggestive of early small fiber neuropathy     Continue gabapentin 100 mg twice daily     Continue diet modification for prediabetes, follow with PCP  Continue B12 and lipoic acid supplement     RTC in about 1 year     See orders and medications filed with this encounter. The patient indicates understanding of these issues and agrees with the plan.     Anette Russo MD  [facility] [place]        Meds This Visit:  Requested Prescriptions        No prescriptions requested or ordered in this encounter         Imaging & Referrals:  None      ID#[id number]

## 2025-08-01 ENCOUNTER — OFFICE VISIT (OUTPATIENT)
Dept: ORTHOPEDICS CLINIC | Facility: CLINIC | Age: 80
End: 2025-08-01

## 2025-08-01 VITALS — HEIGHT: 66 IN | WEIGHT: 161 LBS | BODY MASS INDEX: 25.88 KG/M2

## 2025-08-01 DIAGNOSIS — M65.332 TRIGGER MIDDLE FINGER OF LEFT HAND: Primary | ICD-10-CM

## 2025-08-01 PROCEDURE — 20550 NJX 1 TENDON SHEATH/LIGAMENT: CPT | Performed by: PHYSICIAN ASSISTANT

## 2025-08-01 PROCEDURE — 99213 OFFICE O/P EST LOW 20 MIN: CPT | Performed by: PHYSICIAN ASSISTANT

## 2025-08-01 RX ORDER — BETAMETHASONE SODIUM PHOSPHATE AND BETAMETHASONE ACETATE 3; 3 MG/ML; MG/ML
6 INJECTION, SUSPENSION INTRA-ARTICULAR; INTRALESIONAL; INTRAMUSCULAR; SOFT TISSUE ONCE
Status: COMPLETED | OUTPATIENT
Start: 2025-08-01 | End: 2025-08-01

## 2025-08-01 RX ADMIN — BETAMETHASONE SODIUM PHOSPHATE AND BETAMETHASONE ACETATE 6 MG: 3; 3 INJECTION, SUSPENSION INTRA-ARTICULAR; INTRALESIONAL; INTRAMUSCULAR; SOFT TISSUE at 10:15:00

## (undated) DEVICE — RECIPROCATING BLADE, DOUBLE SIDED, OFFSET  (70.0 X 1.0 X 12.5MM)

## (undated) DEVICE — ANTIBACTERIAL UNDYED BRAIDED (POLYGLACTIN 910), SYNTHETIC ABSORBABLE SUTURE: Brand: COATED VICRYL

## (undated) DEVICE — SLEEVE COMPR MD KNEE LEN SGL USE KENDALL SCD

## (undated) DEVICE — SOLUTION PREP 4OZ 10% POVIDONE IOD SCR TOP

## (undated) DEVICE — SOLUTION SCRB 4OZ 4% CHG ANTISEPSIS HIBICLN

## (undated) DEVICE — Device: Brand: DEFENDO AIR/WATER/SUCTION AND BIOPSY VALVE

## (undated) DEVICE — LAPAROTOMY SPONGE - RF AND X-RAY DETECTABLE PRE-WASHED: Brand: SITUATE

## (undated) DEVICE — DRAPE,TOWEL,LARGE,INVISISHIELD: Brand: MEDLINE

## (undated) DEVICE — SMOOTH PINS PACK: Brand: KNEE INSTRUMENTS

## (undated) DEVICE — FORCEP RADIAL JAW 4

## (undated) DEVICE — 3 BONE CEMENT MIXER: Brand: MIXEVAC

## (undated) DEVICE — SHORT THREADED PINS PACK: Brand: KNEE INSTRUMENTS

## (undated) DEVICE — DRAPE,U/SHT,SPLIT,FILM,60X84,STERILE: Brand: MEDLINE

## (undated) DEVICE — NEPTUNE E-SEP SMOKE EVACUATION PENCIL, COATED, 70MM BLADE, PUSH BUTTON SWITCH: Brand: NEPTUNE E-SEP

## (undated) DEVICE — DISPOSABLE TOURNIQUET CUFF SINGLE BLADDER, DUAL PORT AND QUICK CONNECT CONNECTOR: Brand: COLOR CUFF

## (undated) DEVICE — TOWEL,OR,DSP,ST,BLUE,DLX,2/PK,40PK/CS: Brand: MEDLINE

## (undated) DEVICE — GOWN,SIRUS,FABRIC-REINFORCED,X-LARGE: Brand: MEDLINE

## (undated) DEVICE — FILTERLINE NASAL ADULT O2/CO2

## (undated) DEVICE — PREMIUM WET SKIN PREP TRAY: Brand: MEDLINE INDUSTRIES, INC.

## (undated) DEVICE — STRYKER PERFORMANCE SERIES SAGITTAL BLADE: Brand: STRYKER PERFORMANCE SERIES

## (undated) DEVICE — SOLUTION IRRIG 3000ML 0.9% NACL FLX CONT

## (undated) DEVICE — BANDAGE,COHESIVE,TAN,4X5YD,LF,STRL: Brand: MEDLINE

## (undated) DEVICE — SWORD PIN PACK: Brand: KNEE INSTRUMENTS

## (undated) DEVICE — THREADED PINS PACK: Brand: KNEE INSTRUMENTS

## (undated) DEVICE — TIP CLEANER: Brand: VALLEYLAB

## (undated) DEVICE — 3M™ IOBAN™ 2 ANTIMICROBIAL INCISE DRAPE 6651EZ: Brand: IOBAN™ 2

## (undated) DEVICE — NEEDLE SPNL 18GA L3.5IN PNK QNCKE STYL DISP

## (undated) DEVICE — 1200CC GUARDIAN II: Brand: GUARDIAN

## (undated) DEVICE — PADDING CAST 4INX4YD 100% COT SFT SLF BOND

## (undated) DEVICE — SYRINGE MED 30ML STD CLR PLAS LL TIP N CTRL

## (undated) DEVICE — GLOVE SUR 7.5 SENSICARE PI PIP GRN PWD F

## (undated) DEVICE — SUT ETHBND XL 5 30IN V-37 NABSRB GRN 40MM 1/2

## (undated) DEVICE — GLOVE SUR 8.5 SENSICARE PI PIP GRN PWD F

## (undated) DEVICE — COVER,LIGHT,CAMERA,HARD,1/PK,STRL: Brand: MEDLINE

## (undated) DEVICE — DRESSING ANTIMIC 3.5 X 12 IN AQCEL AG ADVNTG

## (undated) DEVICE — CONTAINER,SPECIMEN,PNEU TUBE,4OZ,OR STRL: Brand: MEDLINE

## (undated) DEVICE — 3M™ RED DOT™ MONITORING ELECTRODE WITH FOAM TAPE AND STICKY GEL, 50/BAG, 20/CASE, 72/PLT 2570: Brand: RED DOT™

## (undated) DEVICE — GLOVE SUR 7.5 SENSICARE PI PIP CRM PWD F

## (undated) DEVICE — SCREWS PACK: Brand: KNEE INSTRUMENTS

## (undated) DEVICE — ENDOSCOPY PACK - LOWER: Brand: MEDLINE INDUSTRIES, INC.

## (undated) DEVICE — GLOVE SUR 8 SENSICARE PI PIP CRM PWD F

## (undated) DEVICE — HOOD, PEEL-AWAY: Brand: FLYTE

## (undated) DEVICE — TOTAL HIP CDS: Brand: MEDLINE INDUSTRIES, INC.

## (undated) DEVICE — SOLUTION RUBBING 4OZ 70% ISO ALC CLR

## (undated) DEVICE — HOOD: Brand: FLYTE

## (undated) DEVICE — WRAP COMPR UNIV KNEE HOT CLD GEL MICWV AND

## (undated) NOTE — LETTER
To:  Dr Pattie Jenkins  Date:  11/4/2021  Fax #: [phone]  Patient Name: Luz Maria Burroughs / Sex: 7/29/1945-A: 77 y  female  Phone: [phone]         CSN: [id number]       Medical Records: [id number]    Eliquis management for Surgery Requested

## (undated) NOTE — LETTER
Nereyda Hernandez M.D.     Surgical Clearance Needed    Date: 10/18/2021                                                                       From: Rupali Sharma    Attn: Dr Verónica Major

## (undated) NOTE — MR AVS SNAPSHOT
After Visit Summary   3/20/2017    Uzma Book    MRN: [id number]           Allergies     Clindamycin Rash    Erythromycin Rash    Pcn [Penicillins] Rash      Your Vital Signs Were     BP Pulse Temp(Src) Resp    115/64 mmHg 72 97.8 °F (36.6 °C) ( The following orders were created for panel order CBC WITH DIFFERENTIAL WITH PLATELET.   Procedure                               Abnormality         Status                     ---------                               -----------         ------ Visits < Visit Summaries. MyChart questions? Call [phone] for help. [employer] is NOT to be used for urgent needs. For medical emergencies, dial 911.

## (undated) NOTE — LETTER
24      Orthopedic Surgery   Pre-Operative Clearance Request    Patient Name:   Carlota Martinez             :   1945    Surgeon: Dr. Villanueva             Date of Surgery: 11/15/2024    Surgical Procedure: Left total knee arthroplasty       MUST COMPLETE ALL OF THE FOLLOWING 2-3 WEEKS PRIOR TO YOUR SURGERY TO AVOID CANCELLATION, DUE TO THE RULE THEIR WILL BE NO EXCEPTIONS!      [x]  History and Physical      [x]  Medical  Clearance                   Required pre-op testing to be ordered:                      [x]  CBC W/Diff                                                                 [x]  CMP                                                                                                                                                                                                                                        [x]  PT/INR  [x]  PTT  [x]  Type and Screen                       **Please fax test results, H&P, and clearance to [phone] and to P.A.T at [phone]**

## (undated) NOTE — MR AVS SNAPSHOT
After Visit Summary   4/9/2024    Carlota Martinez   MRN: [id number]           Visit Information     Date & Time  4/9/2024  2:15 PM Provider  Dawn Cornelius DO Department  [facility] Phone  [phone]      Your Vitals Were     LMP    (LMP Unknown)             Allergies as of 4/9/2024  Review status set to Review Complete on 2/28/2024       Noted Reaction Type Reactions    Other 09/03/2013    UNKNOWN    Hay fever      Your Current Medications        Dosage    Glucosamine-Chondroitin (GLUCOSAMINE CHONDR COMPLEX OR) Take by mouth.    apixaban (ELIQUIS) 2.5 MG Oral Tab Take 1 tablet (2.5 mg total) by mouth 2 (two) times daily.    METOPROLOL TARTRATE 50 MG Oral Tab TAKE ONE-HALF TABLET BY MOUTH  TWICE DAILY    MONTELUKAST 10 MG Oral Tab TAKE 1 TABLET BY MOUTH DAILY AS  NEEDED    AMLODIPINE 5 MG Oral Tab TAKE 1 TABLET BY MOUTH DAILY    Neomycin-Polymyxin-HC 1 % Otic Solution 4 drops to affected ear TID for 10 days, may repeat course as directed    Biotin w/ Vitamins C & E (HAIR/SKIN/NAILS OR) Take by mouth daily.    Vitamin D3, Cholecalciferol, 1000 UNITS Oral Cap Take 1 capsule (1,000 Units total) by mouth 2 (two) times daily.    Multiple Vitamins-Minerals (SENTRY SENIOR) Oral Tab Take by mouth daily.      OCUVITE Oral Tab Take 1 tablet by mouth daily as needed.      Diagnoses for This Visit    Paresthesia of both feet   [[id number]]  -  Primary  Neuropathy, peripheral   [[id number]]             We Ordered the Following     Normal Orders This Visit    EMG/NCV [NEU5 CUSTOM]       Future Appointments        Provider Department    5/29/2024 11:20 AM Garrison Villanueva [address]    8/12/2024 10:30 AM Clint Alcazar [facility] [place]                Did you know that [facility] [place] primary care physicians now offer Video Visits through [employer] for adult patients for a variety of conditions such as allergies, back pain and cold  symptoms? Skip the drive and waiting room and online chat with a doctor face-to-face using your web-cam enabled computer or mobile device wherever you are. Video Visits cost $50 and can be paid hassle-free using a credit, debit, or health savings card.  Not active on [employer]? Ask us how to get signed up today!          If you receive a survey from [employer], please take a few minutes to complete it and provide feedback. We strive to deliver the best patient experience and are looking for ways to make improvements. Your feedback will help us do so. For more information on [employer], please visit [email or web address]           No text in SmartText           No text in SmartText

## (undated) NOTE — MR AVS SNAPSHOT
[facility] [address] [place] [id number] [phone]               Thank you for choosing us for your health care visit with Meta Opitz, MD.  We are glad to serve you and happy to provide you with this summar mouth 2 (two) times daily. Commonly known as:  LOPRESSOR           Montelukast Sodium 10 MG Tabs   Take 1 tablet (10 mg total) by mouth daily.    Commonly known as:  SINGULAIR           Neomycin-Polymyxin-HC 1 % Soln   4 drops to affected ear TID for 10 d

## (undated) NOTE — LETTER
24      Orthopedic Surgery   Pre-Operative Clearance Request    Patient Name:   Carlota Martinez             :   1945    Surgeon: Dr. Villanueva             Date of Surgery: 2025    Surgical Procedure: Left total knee arthroplasty DOS 2025       MUST COMPLETE ALL OF THE FOLLOWING 2-3 WEEKS PRIOR TO YOUR SURGERY TO AVOID CANCELLATION, DUE TO THE RULE THEIR WILL BE NO EXCEPTIONS!    [x]  History and Physical      [x]  Medical  Clearance                   Required pre-op testing to be ordered:                      [x]  CBC W/Diff                                                                 []  CMP                                                                                                                                                                                                                                           [x]  PT/INR  [x]  PTT  [x]  Type and Screen                          **Please fax test results, H&P, and clearance to [phone] and to P.A.T at [phone]**

## (undated) NOTE — LETTER
OUTSIDE TESTING RESULT REQUEST     IMPORTANT: FOR YOUR IMMEDIATE ATTENTION  Please FAX all test results listed below to: [phone]     Testing already done on or about: 10/28/24     * * * * If testing is NOT complete, arrange with patient A.S.A.P. * * * *      Patient Name: Carlota Martinez  Surgery Date: 11/15/2024  Medical Record: [id number]  CSN: [id number]  : 1945 - A: 79 y     Sex: female  Surgeon(s):  Garrison Villanueva MD  Procedure: Left total knee arthroplasty  Anesthesia Type: Choice     Surgeon: Garrison Villanueva MD     The following Testing and Time Line are REQUIRED PER ANESTHESIA     EKG READ AND SIGNED WITHIN   90 days  CBC [with Differential & Platelets] within  90 days  CMP (requires 4 hour fast) within  90 days  PT/INR within  30 days  PTT within  30 days  Type and Screen for Pre-Admission Testing (must be within 28 days of surgery)  MSSA/MRSA Nasal screening within 30 days      Thank You,   Sent by:Sabiha CARRERA AND TYPE AND SCREEN TO BE DONE AT [facility] ONLY

## (undated) NOTE — LETTER
24      Orthopedic Surgery   Pre-Operative Clearance Request    Patient Name:   Carlota Martinez             :   1945    Surgeon: Dr. Villanueva             Date of Surgery: 11/15/2024    Surgical Procedure: Left total knee arthroplasty       MUST COMPLETE ALL OF THE FOLLOWING 2-3 WEEKS PRIOR TO YOUR SURGERY TO AVOID CANCELLATION, DUE TO THE RULE THEIR WILL BE NO EXCEPTIONS!                                   [x]  Other: HEMATOLOGY         **Please fax test results, H&P, and clearance to [phone] and to P.A.T at [phone]**

## (undated) NOTE — ED AVS SNAPSHOT
Abdon Carrel   MRN: [id number]    Department:  [facility] Emergency Department   Date of Visit:  9/2/2019           Disclosure     Insurance plans vary and the physician(s) referred by the ER may not be covered by your plan.  Please contact your tell this physician (or your personal doctor if your instructions are to return to your personal doctor) about any new or lasting problems. The primary care or specialist physician will see patients referred from the [facility] Emergency Department.  Jie Mistry

## (undated) NOTE — LETTER
10/04/17        [address] [place] [address] [facility]abel[facility]      Dear Maria Luisa Morales records indicate that you have outstanding lab work and or testing that was ordered for you and has not yet been completed:    Lipid Panel [E]      To provid